# Patient Record
Sex: FEMALE | Race: OTHER | Employment: FULL TIME | URBAN - METROPOLITAN AREA
[De-identification: names, ages, dates, MRNs, and addresses within clinical notes are randomized per-mention and may not be internally consistent; named-entity substitution may affect disease eponyms.]

---

## 2017-01-07 ENCOUNTER — HOSPITAL ENCOUNTER (EMERGENCY)
Facility: HOSPITAL | Age: 37
Discharge: HOME | End: 2017-01-07
Attending: PHYSICIAN ASSISTANT
Payer: COMMERCIAL

## 2017-01-07 ENCOUNTER — APPOINTMENT (OUTPATIENT)
Dept: ULTRASOUND IMAGING | Facility: HOSPITAL | Age: 37
End: 2017-01-07
Attending: PHYSICIAN ASSISTANT
Payer: COMMERCIAL

## 2017-01-07 VITALS
SYSTOLIC BLOOD PRESSURE: 133 MMHG | DIASTOLIC BLOOD PRESSURE: 75 MMHG | OXYGEN SATURATION: 99 % | BODY MASS INDEX: 25.61 KG/M2 | HEART RATE: 80 BPM | TEMPERATURE: 99 F | HEIGHT: 64 IN | WEIGHT: 150 LBS | RESPIRATION RATE: 16 BRPM

## 2017-01-07 DIAGNOSIS — N93.9 VAGINAL BLEEDING: Primary | ICD-10-CM

## 2017-01-07 LAB
B-HCG SERPL-ACNC: 0 MIU/ML
B-HCG UR QL: NEGATIVE
BASOPHILS # BLD: 0.1 K/UL (ref 0–0.2)
BASOPHILS NFR BLD: 1 %
BILIRUB UR QL: NEGATIVE
CLARITY UR: CLEAR
COLOR UR: YELLOW
EOSINOPHIL # BLD: 0.2 K/UL (ref 0–0.7)
EOSINOPHIL NFR BLD: 2 %
ERYTHROCYTE [DISTWIDTH] IN BLOOD BY AUTOMATED COUNT: 14.8 % (ref 11–15)
GLUCOSE UR-MCNC: NEGATIVE MG/DL
HCT VFR BLD AUTO: 35.7 % (ref 35–48)
HGB BLD-MCNC: 11.7 G/DL (ref 12–16)
HYALINE CASTS #/AREA URNS AUTO: 1 /LPF
KETONES UR-MCNC: 20 MG/DL
LYMPHOCYTES # BLD: 3 K/UL (ref 1–4)
LYMPHOCYTES NFR BLD: 34 %
MCH RBC QN AUTO: 26.6 PG (ref 27–32)
MCHC RBC AUTO-ENTMCNC: 32.9 G/DL (ref 32–37)
MCV RBC AUTO: 80.7 FL (ref 80–100)
MONOCYTES # BLD: 0.6 K/UL (ref 0–1)
MONOCYTES NFR BLD: 7 %
NEUTROPHILS # BLD AUTO: 4.9 K/UL (ref 1.8–7.7)
NEUTROPHILS NFR BLD: 57 %
NITRITE UR QL STRIP.AUTO: NEGATIVE
PH UR: 5 [PH] (ref 5–8)
PLATELET # BLD AUTO: 326 K/UL (ref 140–400)
PMV BLD AUTO: 8 FL (ref 7.4–10.3)
PROT UR-MCNC: NEGATIVE MG/DL
RBC # BLD AUTO: 4.42 M/UL (ref 3.7–5.4)
RBC #/AREA URNS AUTO: 4 /HPF
RH BLOOD TYPE: POSITIVE
SP GR UR STRIP: 1.01 (ref 1–1.03)
UROBILINOGEN UR STRIP-ACNC: <2
VIT C UR-MCNC: NEGATIVE MG/DL
WBC # BLD AUTO: 8.7 K/UL (ref 4–11)
WBC #/AREA URNS AUTO: 2 /HPF

## 2017-01-07 PROCEDURE — 87491 CHLMYD TRACH DNA AMP PROBE: CPT | Performed by: PHYSICIAN ASSISTANT

## 2017-01-07 PROCEDURE — 76856 US EXAM PELVIC COMPLETE: CPT

## 2017-01-07 PROCEDURE — 81001 URINALYSIS AUTO W/SCOPE: CPT | Performed by: PHYSICIAN ASSISTANT

## 2017-01-07 PROCEDURE — 99285 EMERGENCY DEPT VISIT HI MDM: CPT

## 2017-01-07 PROCEDURE — 86900 BLOOD TYPING SEROLOGIC ABO: CPT | Performed by: PHYSICIAN ASSISTANT

## 2017-01-07 PROCEDURE — 87808 TRICHOMONAS ASSAY W/OPTIC: CPT | Performed by: PHYSICIAN ASSISTANT

## 2017-01-07 PROCEDURE — 86901 BLOOD TYPING SEROLOGIC RH(D): CPT | Performed by: PHYSICIAN ASSISTANT

## 2017-01-07 PROCEDURE — 87106 FUNGI IDENTIFICATION YEAST: CPT | Performed by: PHYSICIAN ASSISTANT

## 2017-01-07 PROCEDURE — 84702 CHORIONIC GONADOTROPIN TEST: CPT | Performed by: PHYSICIAN ASSISTANT

## 2017-01-07 PROCEDURE — 76830 TRANSVAGINAL US NON-OB: CPT

## 2017-01-07 PROCEDURE — 81025 URINE PREGNANCY TEST: CPT

## 2017-01-07 PROCEDURE — 85025 COMPLETE CBC W/AUTO DIFF WBC: CPT | Performed by: PHYSICIAN ASSISTANT

## 2017-01-07 PROCEDURE — 87591 N.GONORRHOEAE DNA AMP PROB: CPT | Performed by: PHYSICIAN ASSISTANT

## 2017-01-07 PROCEDURE — 36415 COLL VENOUS BLD VENIPUNCTURE: CPT

## 2017-01-07 PROCEDURE — 87205 SMEAR GRAM STAIN: CPT | Performed by: PHYSICIAN ASSISTANT

## 2017-01-07 RX ORDER — ACETAMINOPHEN 500 MG
1000 TABLET ORAL ONCE
Status: COMPLETED | OUTPATIENT
Start: 2017-01-07 | End: 2017-01-07

## 2017-01-07 NOTE — ED INITIAL ASSESSMENT (HPI)
Pt presents to the er with c/o abd cramping with bleeding that started last night, bright red blood, has used 3 pads in the last four hours.    lmp 10/15/2016

## 2017-01-08 NOTE — ED PROVIDER NOTES
Patient Seen in: [facility] [place] AND [facility] Emergency Department    History   Patient presents with:  Eval-G (gynecologic)    Stated Complaint: Bleeding     HPI    59-year-old female presents with chief complaint of vaginal bleeding. Onset yesterday.   Patient co systems reviewed and negative except as noted above. PSFH elements reviewed from today and agreed except as otherwise stated in HPI.     Physical Exam     ED Triage Vitals   BP 01/07/17 1327 129/86 mmHg   Pulse 01/07/17 1327 80   Resp 01/07/17 1327 20 deformity. Neurological: Gross motor movement is intact in all 4 extremities. Patient exhibits normal speech. Skin: Skin is normal to inspection and palpation. Warm and dry. No obvious bruising. No obvious rash.   ED Course     Labs Reviewed   Marquis Maurice MD [phone]  [address]ilio Barrios  [phone]    Schedule an appointment as soon as possible for a visit in 2 days  For follow-up      Medications Prescribed:  Current Discharge Medication List

## 2017-01-08 NOTE — ED NOTES
D/C INSTRUCTIONS  GIVEN TO PT. STATES VERBAL UNDERSTANDING. INSTRUCTED TO F/U WITH MD AS DIRECTED. HAS NO FURTHER QUESTIONS.

## 2017-01-09 LAB
C TRACH DNA SPEC QL NAA+PROBE: NEGATIVE
GENITAL VAGINOSIS SCREEN: NEGATIVE
N GONORRHOEA DNA SPEC QL NAA+PROBE: NEGATIVE
TRICHOMONAS SCREEN: NEGATIVE

## 2017-01-10 ENCOUNTER — OFFICE VISIT (OUTPATIENT)
Dept: FAMILY MEDICINE CLINIC | Facility: CLINIC | Age: 37
End: 2017-01-10

## 2017-01-10 VITALS
RESPIRATION RATE: 12 BRPM | HEART RATE: 98 BPM | HEIGHT: 64 IN | WEIGHT: 150 LBS | BODY MASS INDEX: 25.61 KG/M2 | SYSTOLIC BLOOD PRESSURE: 125 MMHG | DIASTOLIC BLOOD PRESSURE: 81 MMHG | TEMPERATURE: 100 F

## 2017-01-10 DIAGNOSIS — J02.9 SORE THROAT: Primary | ICD-10-CM

## 2017-01-10 DIAGNOSIS — Z20.818 EXPOSURE TO STREP THROAT: ICD-10-CM

## 2017-01-10 LAB
CONTROL LINE PRESENT WITH A CLEAR BACKGROUND (YES/NO): YES YES/NO
KIT EXPIRATION DATE: NORMAL DATE
KIT LOT #: NORMAL NUMERIC

## 2017-01-10 PROCEDURE — 99213 OFFICE O/P EST LOW 20 MIN: CPT | Performed by: FAMILY MEDICINE

## 2017-01-10 PROCEDURE — 87880 STREP A ASSAY W/OPTIC: CPT | Performed by: FAMILY MEDICINE

## 2017-01-10 RX ORDER — PREDNISONE 20 MG/1
TABLET ORAL
Qty: 10 TABLET | Refills: 0 | Status: SHIPPED | OUTPATIENT
Start: 2017-01-10 | End: 2017-11-27

## 2017-01-10 NOTE — PROGRESS NOTES
Patient ID: Miguel Del Valle is a 43year old female. HPI  Patient presents with:  Sore Throat: x 1 day, exposed to strep    Her son was strep positive at the immediate care was treated with Zithromax. She wanted to be worked in today.   She states s all orders for this visit:    Sore throat  -     POC Rapid Strep [20875]  -     Grp A Strep Cult, Throat [E]; Future  -     predniSONE 20 MG Oral Tab; Take 2 by mouth at same time daily for 5 days. We will start on prednisone and await strep culture.   Her

## 2017-07-03 NOTE — TELEPHONE ENCOUNTER
Pharm e script failed   Pharm requesting to have a refill on       RX vitamin d 52208 iu  Please advise

## 2017-07-07 RX ORDER — ERGOCALCIFEROL 1.25 MG/1
50000 CAPSULE ORAL WEEKLY
Qty: 12 CAPSULE | Refills: 0 | Status: SHIPPED | OUTPATIENT
Start: 2017-07-07 | End: 2017-10-11

## 2017-10-10 ENCOUNTER — TELEPHONE (OUTPATIENT)
Dept: FAMILY MEDICINE CLINIC | Facility: CLINIC | Age: 37
End: 2017-10-10

## 2017-10-11 RX ORDER — ERGOCALCIFEROL 1.25 MG/1
CAPSULE ORAL
Qty: 12 CAPSULE | Refills: 0 | Status: SHIPPED | OUTPATIENT
Start: 2017-10-11 | End: 2017-11-27

## 2017-10-12 NOTE — TELEPHONE ENCOUNTER
Refill times 1 but needs appt for next script. Please call patient. Needs to come in for a physical exam.  This is required for her insurance.

## 2017-10-21 ENCOUNTER — HOSPITAL ENCOUNTER (OUTPATIENT)
Age: 37
Discharge: HOME | End: 2017-10-21
Attending: EMERGENCY MEDICINE
Payer: COMMERCIAL

## 2017-10-21 VITALS
WEIGHT: 160 LBS | OXYGEN SATURATION: 100 % | BODY MASS INDEX: 27.31 KG/M2 | RESPIRATION RATE: 18 BRPM | HEART RATE: 77 BPM | TEMPERATURE: 98 F | SYSTOLIC BLOOD PRESSURE: 117 MMHG | HEIGHT: 64 IN | DIASTOLIC BLOOD PRESSURE: 80 MMHG

## 2017-10-21 DIAGNOSIS — M27.3 DRY TOOTH SOCKET: Primary | ICD-10-CM

## 2017-10-21 PROCEDURE — 99212 OFFICE O/P EST SF 10 MIN: CPT

## 2017-10-21 RX ORDER — CLINDAMYCIN HYDROCHLORIDE 150 MG/1
150 CAPSULE ORAL 3 TIMES DAILY
Qty: 21 CAPSULE | Refills: 0 | Status: SHIPPED | OUTPATIENT
Start: 2017-10-21 | End: 2017-10-28

## 2017-10-21 NOTE — ED PROVIDER NOTES
Patient Seen in: [facility] [address] [place] [address]    History   Patient presents with:  Fever (infectious)    Stated Complaint: fever after tooth extraction    HPI    Patient is a 78-year-old female who states that she had a tooth extracted se ============================================================  ED Course  ------------------------------------------------------------   Tooth socket irrigated with saline with some improvement  MDM           Disposition and Plan     Clinical Impression:  D

## 2017-11-27 ENCOUNTER — HOSPITAL ENCOUNTER (OUTPATIENT)
Dept: GENERAL RADIOLOGY | Age: 37
Discharge: HOME | End: 2017-11-27
Attending: FAMILY MEDICINE
Payer: COMMERCIAL

## 2017-11-27 ENCOUNTER — OFFICE VISIT (OUTPATIENT)
Dept: FAMILY MEDICINE CLINIC | Facility: CLINIC | Age: 37
End: 2017-11-27

## 2017-11-27 ENCOUNTER — NURSE TRIAGE (OUTPATIENT)
Dept: OTHER | Age: 37
End: 2017-11-27

## 2017-11-27 VITALS
SYSTOLIC BLOOD PRESSURE: 118 MMHG | HEIGHT: 64 IN | RESPIRATION RATE: 12 BRPM | HEART RATE: 81 BPM | WEIGHT: 158.63 LBS | DIASTOLIC BLOOD PRESSURE: 77 MMHG | BODY MASS INDEX: 27.08 KG/M2 | TEMPERATURE: 99 F

## 2017-11-27 DIAGNOSIS — R20.2 PARESTHESIAS IN RIGHT HAND: ICD-10-CM

## 2017-11-27 DIAGNOSIS — M25.531 RIGHT WRIST PAIN: ICD-10-CM

## 2017-11-27 DIAGNOSIS — M25.531 RIGHT WRIST PAIN: Primary | ICD-10-CM

## 2017-11-27 DIAGNOSIS — Z88.6 ALLERGY TO NSAIDS: ICD-10-CM

## 2017-11-27 DIAGNOSIS — G56.21 ULNAR NEURITIS, RIGHT: ICD-10-CM

## 2017-11-27 PROCEDURE — 73110 X-RAY EXAM OF WRIST: CPT | Performed by: FAMILY MEDICINE

## 2017-11-27 PROCEDURE — 99212 OFFICE O/P EST SF 10 MIN: CPT | Performed by: FAMILY MEDICINE

## 2017-11-27 PROCEDURE — 99214 OFFICE O/P EST MOD 30 MIN: CPT | Performed by: FAMILY MEDICINE

## 2017-11-27 PROCEDURE — L3908 WHO COCK-UP NONMOLDE PRE OTS: HCPCS | Performed by: FAMILY MEDICINE

## 2017-11-27 PROCEDURE — 73080 X-RAY EXAM OF ELBOW: CPT | Performed by: FAMILY MEDICINE

## 2017-11-27 RX ORDER — METHOCARBAMOL 750 MG/1
750 TABLET, FILM COATED ORAL 3 TIMES DAILY PRN
Qty: 60 TABLET | Refills: 0 | Status: SHIPPED | OUTPATIENT
Start: 2017-11-27 | End: 2017-12-07

## 2017-11-27 NOTE — PATIENT INSTRUCTIONS
Avoid resting her right elbow on a hard surface. You may want to put a pillow or a pad underneath her elbow so as not to irritate the ulnar nerves. We are a wrist brace on the right wrist every night for the next month.   If you can wear it at work and

## 2017-11-27 NOTE — TELEPHONE ENCOUNTER
Action Requested: Summary for Provider     []  Critical Lab, Recommendations Needed  [] Need Additional Advice  []   FYI    []   Need Orders  [] Need Medications Sent to Pharmacy  []  Other     SUMMARY: Dr. Brianna Rios consulted, agreed for patient to come in t work.)  Alleviated by: acetaminophen;heat (Tylenol helps a little. Compression wrist support helped a little.  Heat helped a little.)             Reason for Disposition  • Numbness (i.e., loss of sensation) of new onset in hand or fingers    Protocols used:

## 2017-11-27 NOTE — PROGRESS NOTES
Patient ID: Dayron Karimi is a 42year old female. HPI  Patient presents with:  Pain: right wrist     I saw her son today. She wanted to be worked in at the same time. She is right-handed.   She states she has had pain and tingling in the third t oriented to person, place, and time. Patient appears well-developed and well-nourished. No distress. Vitals have been reviewed. Right wrist: She does have a positive Tinel's at the median nerve. She also has a positive flick test.  No hand atrophy. XR ELBOW, (3 VIEWS), RIGHT (CPT=73070); Future  -     methocarbamol 750 MG Oral Tab; Take 1 tablet (750 mg total) by mouth 3 (three) times daily as needed. For muscle relaxation. Can possibly make you tired.   X-ray of the elbow just make sure there is

## 2017-11-29 NOTE — TELEPHONE ENCOUNTER
Myra Shah from pharmacy is calling to follow up on pts refill request        Current Outpatient Prescriptions:  ERGOCALCIFEROL 41729 units Oral Cap TAKE 1 CAPSULE BY MOUTH 1 TIME A WEEK Disp: 12 capsule Rfl: 0

## 2017-11-30 RX ORDER — ERGOCALCIFEROL 1.25 MG/1
CAPSULE ORAL
Qty: 12 CAPSULE | Refills: 0 | Status: SHIPPED | OUTPATIENT
Start: 2017-11-30 | End: 2018-07-03

## 2018-07-03 ENCOUNTER — OFFICE VISIT (OUTPATIENT)
Dept: FAMILY MEDICINE CLINIC | Facility: CLINIC | Age: 38
End: 2018-07-03

## 2018-07-03 VITALS
HEART RATE: 85 BPM | BODY MASS INDEX: 27 KG/M2 | DIASTOLIC BLOOD PRESSURE: 75 MMHG | TEMPERATURE: 99 F | SYSTOLIC BLOOD PRESSURE: 125 MMHG | WEIGHT: 156 LBS

## 2018-07-03 DIAGNOSIS — N30.00 ACUTE CYSTITIS WITHOUT HEMATURIA: ICD-10-CM

## 2018-07-03 DIAGNOSIS — R35.0 URINE FREQUENCY: Primary | ICD-10-CM

## 2018-07-03 LAB
CONTROL LINE PRESENT WITH A CLEAR BACKGROUND (YES/NO): YES YES/NO
KIT LOT #: NORMAL NUMERIC
MULTISTIX LOT#: ABNORMAL NUMERIC
PH, URINE: 7.5 (ref 4.5–8)
PREGNANCY TEST, URINE: NEGATIVE
SPECIFIC GRAVITY: 1.01 (ref 1–1.03)
URINE-COLOR: YELLOW

## 2018-07-03 PROCEDURE — 99212 OFFICE O/P EST SF 10 MIN: CPT | Performed by: FAMILY MEDICINE

## 2018-07-03 PROCEDURE — 99213 OFFICE O/P EST LOW 20 MIN: CPT | Performed by: FAMILY MEDICINE

## 2018-07-03 PROCEDURE — 81002 URINALYSIS NONAUTO W/O SCOPE: CPT | Performed by: FAMILY MEDICINE

## 2018-07-03 PROCEDURE — 81025 URINE PREGNANCY TEST: CPT | Performed by: FAMILY MEDICINE

## 2018-07-03 RX ORDER — FLUCONAZOLE 150 MG/1
150 TABLET ORAL ONCE
Qty: 1 TABLET | Refills: 0 | Status: SHIPPED | OUTPATIENT
Start: 2018-07-03 | End: 2018-07-03

## 2018-07-03 RX ORDER — LEVOFLOXACIN 500 MG/1
500 TABLET, FILM COATED ORAL DAILY
Qty: 7 TABLET | Refills: 0 | Status: SHIPPED | OUTPATIENT
Start: 2018-07-03 | End: 2018-07-05

## 2018-07-03 NOTE — PROGRESS NOTES
HPI:    Patient ID: Rony Miller is a 34year old female. HPI  Patient presents with:  Abdominal Pain: RT side pain, had UTI symptoms 3 days ago,     Review of Systems   Constitutional: Negative.     Genitourinary: Positive for dysuria, flank beatriz

## 2018-07-05 ENCOUNTER — APPOINTMENT (OUTPATIENT)
Dept: ULTRASOUND IMAGING | Facility: HOSPITAL | Age: 38
End: 2018-07-05
Attending: EMERGENCY MEDICINE
Payer: COMMERCIAL

## 2018-07-05 ENCOUNTER — NURSE TRIAGE (OUTPATIENT)
Dept: OTHER | Age: 38
End: 2018-07-05

## 2018-07-05 ENCOUNTER — HOSPITAL ENCOUNTER (EMERGENCY)
Facility: HOSPITAL | Age: 38
Discharge: HOME | End: 2018-07-05
Attending: EMERGENCY MEDICINE
Payer: COMMERCIAL

## 2018-07-05 VITALS
DIASTOLIC BLOOD PRESSURE: 82 MMHG | WEIGHT: 150 LBS | OXYGEN SATURATION: 99 % | BODY MASS INDEX: 25.61 KG/M2 | TEMPERATURE: 98 F | SYSTOLIC BLOOD PRESSURE: 125 MMHG | HEIGHT: 64 IN | RESPIRATION RATE: 18 BRPM | HEART RATE: 75 BPM

## 2018-07-05 DIAGNOSIS — N39.0 URINARY TRACT INFECTION WITHOUT HEMATURIA, SITE UNSPECIFIED: Primary | ICD-10-CM

## 2018-07-05 DIAGNOSIS — K29.00 ACUTE GASTRITIS WITHOUT HEMORRHAGE, UNSPECIFIED GASTRITIS TYPE: ICD-10-CM

## 2018-07-05 LAB
ALBUMIN SERPL BCP-MCNC: 4 G/DL (ref 3.5–4.8)
ALP SERPL-CCNC: 99 U/L (ref 32–100)
ALT SERPL-CCNC: 18 U/L (ref 14–54)
ANION GAP SERPL CALC-SCNC: 8 MMOL/L (ref 0–18)
AST SERPL-CCNC: 20 U/L (ref 15–41)
B-HCG UR QL: NEGATIVE
BACTERIA UR QL AUTO: NEGATIVE /HPF
BASOPHILS # BLD: 0.1 K/UL (ref 0–0.2)
BASOPHILS NFR BLD: 1 %
BILIRUB DIRECT SERPL-MCNC: 0.1 MG/DL (ref 0–0.2)
BILIRUB SERPL-MCNC: 0.5 MG/DL (ref 0.3–1.2)
BILIRUB UR QL: NEGATIVE
BUN SERPL-MCNC: 13 MG/DL (ref 8–20)
BUN/CREAT SERPL: 16.7 (ref 10–20)
CALCIUM SERPL-MCNC: 9 MG/DL (ref 8.5–10.5)
CHLORIDE SERPL-SCNC: 101 MMOL/L (ref 95–110)
CLARITY UR: CLEAR
CO2 SERPL-SCNC: 26 MMOL/L (ref 22–32)
COLOR UR: YELLOW
CREAT SERPL-MCNC: 0.78 MG/DL (ref 0.5–1.5)
EOSINOPHIL # BLD: 0.4 K/UL (ref 0–0.7)
EOSINOPHIL NFR BLD: 3 %
ERYTHROCYTE [DISTWIDTH] IN BLOOD BY AUTOMATED COUNT: 14.8 % (ref 11–15)
GLUCOSE SERPL-MCNC: 100 MG/DL (ref 70–99)
GLUCOSE UR-MCNC: NEGATIVE MG/DL
HCT VFR BLD AUTO: 37.5 % (ref 35–48)
HGB BLD-MCNC: 12.5 G/DL (ref 12–16)
KETONES UR-MCNC: NEGATIVE MG/DL
LACTATE SERPL-SCNC: 0.9 MMOL/L (ref 0.5–2.2)
LIPASE SERPL-CCNC: 38 U/L (ref 22–51)
LYMPHOCYTES # BLD: 4.8 K/UL (ref 1–4)
LYMPHOCYTES NFR BLD: 40 %
MCH RBC QN AUTO: 26.6 PG (ref 27–32)
MCHC RBC AUTO-ENTMCNC: 33.3 G/DL (ref 32–37)
MCV RBC AUTO: 80 FL (ref 80–100)
MONOCYTES # BLD: 0.8 K/UL (ref 0–1)
MONOCYTES NFR BLD: 7 %
NEUTROPHILS # BLD AUTO: 5.9 K/UL (ref 1.8–7.7)
NEUTROPHILS NFR BLD: 49 %
NITRITE UR QL STRIP.AUTO: NEGATIVE
OSMOLALITY UR CALC.SUM OF ELEC: 280 MOSM/KG (ref 275–295)
PH UR: 5 [PH] (ref 5–8)
PLATELET # BLD AUTO: 416 K/UL (ref 140–400)
PMV BLD AUTO: 8.2 FL (ref 7.4–10.3)
POTASSIUM SERPL-SCNC: 3.6 MMOL/L (ref 3.3–5.1)
PROT SERPL-MCNC: 7.6 G/DL (ref 5.9–8.4)
PROT UR-MCNC: NEGATIVE MG/DL
RBC # BLD AUTO: 4.69 M/UL (ref 3.7–5.4)
RBC #/AREA URNS AUTO: 2 /HPF
SODIUM SERPL-SCNC: 135 MMOL/L (ref 136–144)
SP GR UR STRIP: 1.02 (ref 1–1.03)
UROBILINOGEN UR STRIP-ACNC: <2
VIT C UR-MCNC: NEGATIVE MG/DL
WBC # BLD AUTO: 12 K/UL (ref 4–11)
WBC #/AREA URNS AUTO: 9 /HPF

## 2018-07-05 PROCEDURE — 99284 EMERGENCY DEPT VISIT MOD MDM: CPT

## 2018-07-05 PROCEDURE — 96361 HYDRATE IV INFUSION ADD-ON: CPT

## 2018-07-05 PROCEDURE — 85025 COMPLETE CBC W/AUTO DIFF WBC: CPT | Performed by: EMERGENCY MEDICINE

## 2018-07-05 PROCEDURE — 76705 ECHO EXAM OF ABDOMEN: CPT | Performed by: EMERGENCY MEDICINE

## 2018-07-05 PROCEDURE — 87086 URINE CULTURE/COLONY COUNT: CPT | Performed by: EMERGENCY MEDICINE

## 2018-07-05 PROCEDURE — 83690 ASSAY OF LIPASE: CPT | Performed by: EMERGENCY MEDICINE

## 2018-07-05 PROCEDURE — 80048 BASIC METABOLIC PNL TOTAL CA: CPT | Performed by: EMERGENCY MEDICINE

## 2018-07-05 PROCEDURE — 96375 TX/PRO/DX INJ NEW DRUG ADDON: CPT

## 2018-07-05 PROCEDURE — 81025 URINE PREGNANCY TEST: CPT

## 2018-07-05 PROCEDURE — 80076 HEPATIC FUNCTION PANEL: CPT | Performed by: EMERGENCY MEDICINE

## 2018-07-05 PROCEDURE — 96374 THER/PROPH/DIAG INJ IV PUSH: CPT

## 2018-07-05 PROCEDURE — 81001 URINALYSIS AUTO W/SCOPE: CPT | Performed by: EMERGENCY MEDICINE

## 2018-07-05 PROCEDURE — 83605 ASSAY OF LACTIC ACID: CPT | Performed by: EMERGENCY MEDICINE

## 2018-07-05 RX ORDER — CEPHALEXIN 500 MG/1
500 CAPSULE ORAL 3 TIMES DAILY
Qty: 21 CAPSULE | Refills: 0 | Status: SHIPPED | OUTPATIENT
Start: 2018-07-05 | End: 2018-07-12

## 2018-07-05 RX ORDER — DIPHENHYDRAMINE HYDROCHLORIDE 50 MG/ML
25 INJECTION INTRAMUSCULAR; INTRAVENOUS ONCE
Status: COMPLETED | OUTPATIENT
Start: 2018-07-05 | End: 2018-07-05

## 2018-07-05 RX ORDER — METOCLOPRAMIDE HYDROCHLORIDE 5 MG/ML
10 INJECTION INTRAMUSCULAR; INTRAVENOUS ONCE
Status: COMPLETED | OUTPATIENT
Start: 2018-07-05 | End: 2018-07-05

## 2018-07-05 RX ORDER — FAMOTIDINE 20 MG/1
20 TABLET ORAL ONCE
Status: COMPLETED | OUTPATIENT
Start: 2018-07-05 | End: 2018-07-05

## 2018-07-05 NOTE — TELEPHONE ENCOUNTER
Please reply to pool: EM RN TRIAGE  Action Requested: Summary for Provider     []  Critical Lab, Recommendations Needed  [] Need Additional Advice  [x]   FYI    []   Need Orders  [] Need Medications Sent to Pharmacy  []  Other     SUMMARY: Patient callin

## 2018-07-05 NOTE — ED INITIAL ASSESSMENT (HPI)
Abd distention since this morning, nausea noted. No vomiting. Last BM last night. No blood or straining. Pain noted to right and generalized abd along with right flank. Recently dx with kidney infection. Currently on abx.

## 2018-07-06 NOTE — ED PROVIDER NOTES
Patient Seen in: [facility] AND [facility] Emergency Department     History   Patient presents with:  Abdomen/Flank Pain (GI/)  Nausea/Vomiting/Diarrhea (gastrointestinal)    Stated Complaint: Abd distention started today.      HPI    34year old female complains Oral  SpO2: 99 %  O2 Device: None (Room air)    Current:/82   Pulse 75   Temp 98.2 °F (36.8 °C) (Oral)   Resp 18   Ht 162.6 cm (5' 4\")   Wt 68 kg   LMP 06/19/2018   SpO2 99%   BMI 25.75 kg/m²         Physical Exam   Constitutional: She is oriented t within normal limits   BASIC METABOLIC PANEL (8) - Abnormal; Notable for the following:     Glucose 100 (*)     Sodium 135 (*)     All other components within normal limits   CBC W/ DIFFERENTIAL - Abnormal; Notable for the following:     WBC 12.0 (*)     M ED Medications Administered:   Medications   G. I. cocktail (Mylanta/dicyclomine/lidocaine 2% viscous) ( Oral Given 7/5/18 2044)   famoTIDine (PEPCID) tab 20 mg (20 mg Oral Given 7/5/18 2044)   sodium chloride 0.9% IV bolus 1,000 mL (1,000 mL Intravenou for serious bacterial infection/sepsis picture  patient's symptoms resolved after ED treatment. overall clinical presentation including general toxicity and distal perfusion are no significant change.  hemodynamic function is no significant change.      D return instructions, and follow up information were provided prior to discharge from the ED if sent home.  We also recommended that the patient schedule follow up care with a primary care provider as soon as possible to obtain basic health screening includi

## 2018-07-06 NOTE — ED NOTES
Patient off floor to [facility] [facility]

## 2018-07-06 NOTE — TELEPHONE ENCOUNTER
ED FU Call: No answer at home [facility] hospital#[phone] and no voice mail option to leave a message. Clinical staff to call again later for  Status check.

## 2018-07-07 NOTE — TELEPHONE ENCOUNTER
Pt to take tylenol 2 tabs q 4-6 hrs prn.   Miralax 1 capful daily, plenty of water should help resolve constipation

## 2018-07-07 NOTE — TELEPHONE ENCOUNTER
Please advise. OV appt was made for 18 with Dr. Bernabe Sánchez but pt requesting possible Rx for the pain until then. To [facility] Mendenhall for Dr. Bernabe Sánchez out of office.   Please reply to pool: EM RN TRIAGE    Pt contacted (Name and  verified) and states th

## 2018-07-09 ENCOUNTER — OFFICE VISIT (OUTPATIENT)
Dept: FAMILY MEDICINE CLINIC | Facility: CLINIC | Age: 38
End: 2018-07-09

## 2018-07-09 VITALS
DIASTOLIC BLOOD PRESSURE: 78 MMHG | BODY MASS INDEX: 25.57 KG/M2 | HEIGHT: 64 IN | HEART RATE: 71 BPM | TEMPERATURE: 98 F | SYSTOLIC BLOOD PRESSURE: 109 MMHG | RESPIRATION RATE: 14 BRPM | WEIGHT: 149.81 LBS

## 2018-07-09 DIAGNOSIS — E78.2 MIXED HYPERLIPIDEMIA: ICD-10-CM

## 2018-07-09 DIAGNOSIS — N30.00 ACUTE CYSTITIS WITHOUT HEMATURIA: ICD-10-CM

## 2018-07-09 DIAGNOSIS — R14.0 ABDOMINAL BLOATING: Primary | ICD-10-CM

## 2018-07-09 PROCEDURE — 99214 OFFICE O/P EST MOD 30 MIN: CPT | Performed by: FAMILY MEDICINE

## 2018-07-09 PROCEDURE — 99212 OFFICE O/P EST SF 10 MIN: CPT | Performed by: FAMILY MEDICINE

## 2018-07-09 NOTE — PROGRESS NOTES
Patient ID: Janee Gallardo is a 34year old female. HPI  Patient presents with:  ER F/U: uti     Patient went to the emergency room on July 5, 2018 due to right sided abdominal discomfort with dysuria for the past 5 days.   She was started on Leva CREATSERUM 0.78 07/05/2018   ANIONGAP 8 07/05/2018   GFRNAA >60 07/05/2018   GFRAA >60 07/05/2018   CA 9.0 07/05/2018   OSMOCALC 280 07/05/2018   ALKPHO 99 07/05/2018   AST 20 07/05/2018   ALT 18 07/05/2018   ALKPHOS 86 11/05/2011   BILT 0.5 07/05/2018 TIBCP, IRONBIND, SAT, SATUR    No results found for: YONG      Lab Results  Component Value Date   VITD25 22 (L) 10/08/2016   VDGZ04LZ 19.0 02/04/2012     No results found for: PSA, QPSA, TOTPSASCREEN      Wt Readings from Last 6 Encounters:  07/09/18 : 149 There is    no tenderness. There is no rigidity, no rebound, no guarding and negative  Rivero's sign. Neurological: Patient is alert and oriented to person, place, and time. Skin: Skin is warm and dry.    Psychiatric: Patient has a normal mood and affe

## 2018-12-11 ENCOUNTER — OFFICE VISIT (OUTPATIENT)
Dept: FAMILY MEDICINE CLINIC | Facility: CLINIC | Age: 38
End: 2018-12-11
Payer: COMMERCIAL

## 2018-12-11 VITALS
BODY MASS INDEX: 27.49 KG/M2 | HEART RATE: 80 BPM | SYSTOLIC BLOOD PRESSURE: 123 MMHG | TEMPERATURE: 99 F | DIASTOLIC BLOOD PRESSURE: 83 MMHG | WEIGHT: 161 LBS | HEIGHT: 64 IN

## 2018-12-11 DIAGNOSIS — J02.9 PHARYNGITIS, UNSPECIFIED ETIOLOGY: Primary | ICD-10-CM

## 2018-12-11 DIAGNOSIS — J02.9 SORE THROAT: ICD-10-CM

## 2018-12-11 PROCEDURE — 99212 OFFICE O/P EST SF 10 MIN: CPT | Performed by: PHYSICIAN ASSISTANT

## 2018-12-11 PROCEDURE — 87880 STREP A ASSAY W/OPTIC: CPT | Performed by: PHYSICIAN ASSISTANT

## 2018-12-11 PROCEDURE — 99213 OFFICE O/P EST LOW 20 MIN: CPT | Performed by: PHYSICIAN ASSISTANT

## 2018-12-11 RX ORDER — ACETAMINOPHEN 325 MG/1
325 TABLET ORAL EVERY 6 HOURS PRN
COMMUNITY
End: 2020-03-09

## 2018-12-11 RX ORDER — AMOXICILLIN AND CLAVULANATE POTASSIUM 875; 125 MG/1; MG/1
1 TABLET, FILM COATED ORAL 2 TIMES DAILY
Qty: 20 TABLET | Refills: 0 | Status: SHIPPED | OUTPATIENT
Start: 2018-12-11 | End: 2019-02-28

## 2018-12-11 NOTE — PROGRESS NOTES
HPI:     Sore Throat    This is a recurrent problem. The current episode started in the past 7 days. The problem has been gradually worsening. The maximum temperature recorded prior to her arrival was 100.4 - 100.9 F.  Associated symptoms include swollen gl Not on file      Highest education level: Not on file    Social Needs      Financial resource strain: Not on file      Food insecurity - worry: Not on file      Food insecurity - inability: Not on file      Transportation needs - medical: Not on file No cerumen present  Left Ear: Tympanic membrane and ear canal normal. Tympanic membrane is not erythematous.  No cerumen present  Nose: Nose normal.   Eyes: Conjunctivae are normal.   Cardiovascular: Normal rate, regular rhythm, S1 normal, S2 normal and nor

## 2018-12-12 NOTE — ASSESSMENT & PLAN NOTE
Supportive measures discussed, may use Tylenol as needed for fever/pain. Increase fluids and rest and gargle mouth with warm salt water. Patient will hold Augmentin until symptoms worsen.

## 2019-02-28 ENCOUNTER — OFFICE VISIT (OUTPATIENT)
Dept: FAMILY MEDICINE CLINIC | Facility: CLINIC | Age: 39
End: 2019-02-28
Payer: COMMERCIAL

## 2019-02-28 VITALS
HEART RATE: 90 BPM | TEMPERATURE: 99 F | SYSTOLIC BLOOD PRESSURE: 132 MMHG | WEIGHT: 161.81 LBS | DIASTOLIC BLOOD PRESSURE: 90 MMHG | HEIGHT: 64 IN | BODY MASS INDEX: 27.63 KG/M2 | RESPIRATION RATE: 16 BRPM

## 2019-02-28 DIAGNOSIS — Z88.6 ALLERGY TO NSAIDS: ICD-10-CM

## 2019-02-28 DIAGNOSIS — L29.9 LOCALIZED PRURITUS: ICD-10-CM

## 2019-02-28 DIAGNOSIS — G89.29 CHRONIC ELBOW PAIN, RIGHT: ICD-10-CM

## 2019-02-28 DIAGNOSIS — M25.521 CHRONIC ELBOW PAIN, RIGHT: ICD-10-CM

## 2019-02-28 DIAGNOSIS — L81.9 HYPERPIGMENTATION OF SKIN: ICD-10-CM

## 2019-02-28 DIAGNOSIS — M77.11 RIGHT TENNIS ELBOW: Primary | ICD-10-CM

## 2019-02-28 PROCEDURE — 99214 OFFICE O/P EST MOD 30 MIN: CPT | Performed by: FAMILY MEDICINE

## 2019-02-28 PROCEDURE — 99212 OFFICE O/P EST SF 10 MIN: CPT | Performed by: FAMILY MEDICINE

## 2019-02-28 RX ORDER — CYCLOBENZAPRINE HCL 10 MG
10 TABLET ORAL NIGHTLY
Qty: 30 TABLET | Refills: 1 | Status: SHIPPED | OUTPATIENT
Start: 2019-02-28 | End: 2019-03-20

## 2019-02-28 NOTE — PROGRESS NOTES
Patient ID: Chilango Valverde is a 34year old female. HPI  Patient presents with:  Pain: right elbow   Rash: breeast and right hip   She is right hand dominant. Patient initially had right wrist pain that started a year ago.  She came in to see me fo pain.   Musculoskeletal: Positive for arthralgias (right elbow). Skin: Positive for rash. Negative for color change. Neurological: Negative for speech difficulty. Psychiatric/Behavioral: The patient is not nervous/anxious.           Past Medical Histo normal mood and affect. Nursing note and vitals reviewed. ASSESSMENT/PLAN:     Diagnoses and all orders for this visit:    Right tennis elbow  -     PHYSICAL THERAPY EXTERNAL  -     Cyclobenzaprine HCl 10 MG Oral Tab;  Take 1 tablet (10 mg total Physician goals: Pain relief, Increased Function, Activities of daily living and Education              Frequency: 2-3 times per week. ....... Rinku Foley Duration: 4-6 weeks                                          Can take to various Physical Therapy locations as is DO Logan. Electronically Signed: Ita Yip, 2/28/2019, 3:34 PM.    IDelilah DO,  personally performed the services described in this documentation. All medical record entries made by the scribe were at my direction and in my presence.

## 2019-02-28 NOTE — PATIENT INSTRUCTIONS
TENNIS ELBOW PLAN    Showed patient my tennis elbow brace and where to wear it on their forearm. Wear the tennis elbow brace 3 fingerbreadths down from the lateral epicondyle as shown at the visit.   Go ahead and ice

## 2019-03-19 ENCOUNTER — TELEPHONE (OUTPATIENT)
Dept: FAMILY MEDICINE CLINIC | Facility: CLINIC | Age: 39
End: 2019-03-19

## 2019-03-19 NOTE — TELEPHONE ENCOUNTER
Dr. Perez Favors,    [facility] intermittent leave for elbow/wrist pain - 1-4 days per month for 6 months starting from 2/28/19. Please sign off on form:  -Highlight the patient and hit \"Chart\" button.   -In Chart Review, w/in the Encounter tab - click 1 time on t

## 2019-03-19 NOTE — TELEPHONE ENCOUNTER
FMLA form recvd at [facility]. Pt attached her own signed HIPAA. Will let her know about fee. Emailed HIPAA packet to [email or web address]. Logged for processing.

## 2019-03-28 NOTE — TELEPHONE ENCOUNTER
Signed release and CC info recvd via email from pt. Emailed FMLA to HR: [email or web address].  Emailed FMLA to pt at [email or web address]

## 2019-04-01 ENCOUNTER — OFFICE VISIT (OUTPATIENT)
Dept: FAMILY MEDICINE CLINIC | Facility: CLINIC | Age: 39
End: 2019-04-01
Payer: COMMERCIAL

## 2019-04-01 VITALS
TEMPERATURE: 98 F | DIASTOLIC BLOOD PRESSURE: 84 MMHG | SYSTOLIC BLOOD PRESSURE: 123 MMHG | BODY MASS INDEX: 27.28 KG/M2 | RESPIRATION RATE: 14 BRPM | HEART RATE: 76 BPM | WEIGHT: 159.81 LBS | HEIGHT: 64 IN

## 2019-04-01 DIAGNOSIS — Z88.6 ALLERGY TO NSAIDS: ICD-10-CM

## 2019-04-01 DIAGNOSIS — M77.11 RIGHT TENNIS ELBOW: Primary | ICD-10-CM

## 2019-04-01 DIAGNOSIS — M25.522 LEFT ELBOW PAIN: ICD-10-CM

## 2019-04-01 PROCEDURE — 99214 OFFICE O/P EST MOD 30 MIN: CPT | Performed by: FAMILY MEDICINE

## 2019-04-01 PROCEDURE — 99212 OFFICE O/P EST SF 10 MIN: CPT | Performed by: FAMILY MEDICINE

## 2019-04-01 NOTE — PROGRESS NOTES
Patient ID: Swetha Azevedo is a 34year old female. HPI  Patient presents with: Follow - Up: right elbow pain, pain moving to her shoulder   She is a  and types a lot at work.  Pt has a tennis elbow brace on the right arm i • Lipid screening 02-    Per [employer]   • Vaginal delivery 02/12/1995, 08/24/1997, 06/28/2003       History reviewed. No pertinent surgical history.        Current Outpatient Medications:  acetaminophen 325 MG Oral Tab Take 325 mg by mouth every 6 ( do some time off as work is aggravating this pain. Left elbow pain   Mild pain but this may be due to compensation due to the right elbow pain.   Allergy to NSAIDs  Needs to make an appointment to Dr. Shiva Grullon the allergist.      Referrals (if applicable)

## 2019-04-09 ENCOUNTER — OFFICE VISIT (OUTPATIENT)
Dept: NEUROLOGY | Facility: CLINIC | Age: 39
End: 2019-04-09
Payer: COMMERCIAL

## 2019-04-09 ENCOUNTER — TELEPHONE (OUTPATIENT)
Dept: NEUROLOGY | Facility: CLINIC | Age: 39
End: 2019-04-09

## 2019-04-09 VITALS
HEIGHT: 64 IN | SYSTOLIC BLOOD PRESSURE: 122 MMHG | DIASTOLIC BLOOD PRESSURE: 82 MMHG | HEART RATE: 92 BPM | WEIGHT: 150 LBS | RESPIRATION RATE: 16 BRPM | BODY MASS INDEX: 25.61 KG/M2

## 2019-04-09 DIAGNOSIS — R20.0 NUMBNESS AND TINGLING OF RIGHT HAND: ICD-10-CM

## 2019-04-09 DIAGNOSIS — M77.11 LATERAL EPICONDYLITIS OF RIGHT ELBOW: ICD-10-CM

## 2019-04-09 DIAGNOSIS — R20.2 NUMBNESS AND TINGLING OF RIGHT HAND: ICD-10-CM

## 2019-04-09 DIAGNOSIS — M25.521 RIGHT ELBOW PAIN: Primary | ICD-10-CM

## 2019-04-09 PROCEDURE — 99244 OFF/OP CNSLTJ NEW/EST MOD 40: CPT | Performed by: PHYSICAL MEDICINE & REHABILITATION

## 2019-04-09 RX ORDER — LIDOCAINE 50 MG/G
1 PATCH TOPICAL EVERY 24 HOURS
Qty: 30 PATCH | Refills: 0 | Status: SHIPPED | OUTPATIENT
Start: 2019-04-09 | End: 2019-05-16

## 2019-04-09 NOTE — PATIENT INSTRUCTIONS
1) Ice the lateral elbow 3-4x/day for 20 minutes at a time  2) Schedule EMG with my assistant up front  3) My office will call you to schedule injection once approved by insurance  4) Continue to use the strap and your home exercises program  5) Use the Delvis Aguero

## 2019-04-09 NOTE — TELEPHONE ENCOUNTER
[facility] of RIGHT lateral epicondyle steroid injection under ultrasound guidance cpt code 90868, [id number], . Talked to Darline Priest. who states no authorization is required. Reference #  [id number]. Will  inform Nursing.

## 2019-04-09 NOTE — H&P
[address] [place] [address] H&P    Requesting Physician: Rogelio Alonso DO    Chief Complaint (Reason for Visit):  Patient presents with:  Elbow Pain: Pt is present with Right Elbow Pain.  Pt was diagnosis with tennis Smoking status: Never Smoker      Smokeless tobacco: Never Used    Substance and Sexual Activity      Alcohol use: No        Alcohol/week: 0.0 oz      Drug use: No      Sexual activity: Not on file      CURRENT MEDICATIONS:     Current Outpatient Medicatio ttp Radius, ulna, olecranon process, distal biceps  ROM: intact to all planes of motion without reproducible pain   Strength: 5/5 in all myotomes of the BLUE  Sensation: Intact to light touch in all dermatomes of the lower extremities except decreased at R acute osseous injury of the right elbow. ASSESSMENT AND PLAN:  The patient is a pleasant 40-year-old female who is coming in complaining of right-sided lateral elbow pain with concomitant numbness and tingling in the right hand.   I do believe that she

## 2019-04-10 ENCOUNTER — TELEPHONE (OUTPATIENT)
Dept: NEUROLOGY | Facility: CLINIC | Age: 39
End: 2019-04-10

## 2019-04-10 NOTE — TELEPHONE ENCOUNTER
S/w patient who states she was given a note from Dr. Dennis Ramirez stating she cannot return to work until 04/15/19. Patient is requesting Dr. Ana Wolfe write her a note stating she cannot return to work until 04/22/19 since her arm is still really bothering her.  Tessa Thrasher

## 2019-04-11 ENCOUNTER — MED REC SCAN ONLY (OUTPATIENT)
Dept: NEUROLOGY | Facility: CLINIC | Age: 39
End: 2019-04-11

## 2019-04-11 NOTE — TELEPHONE ENCOUNTER
Note written. Please inform patient. Thanks    Maryellen Phillips.  Nu Moulton MD, [address] [facility]  Physical Medicine and Rehabilitation/Sports Medicine  [facility]

## 2019-04-16 ENCOUNTER — OFFICE VISIT (OUTPATIENT)
Dept: FAMILY MEDICINE CLINIC | Facility: CLINIC | Age: 39
End: 2019-04-16
Payer: COMMERCIAL

## 2019-04-16 VITALS
HEART RATE: 82 BPM | BODY MASS INDEX: 27.31 KG/M2 | DIASTOLIC BLOOD PRESSURE: 84 MMHG | TEMPERATURE: 97 F | WEIGHT: 160 LBS | SYSTOLIC BLOOD PRESSURE: 115 MMHG | HEIGHT: 64 IN

## 2019-04-16 DIAGNOSIS — M77.11 RIGHT TENNIS ELBOW: ICD-10-CM

## 2019-04-16 DIAGNOSIS — E55.9 VITAMIN D DEFICIENCY: ICD-10-CM

## 2019-04-16 DIAGNOSIS — Z88.6 ALLERGY TO NSAIDS: ICD-10-CM

## 2019-04-16 DIAGNOSIS — Z12.4 CERVICAL CANCER SCREENING: ICD-10-CM

## 2019-04-16 DIAGNOSIS — Z12.31 VISIT FOR SCREENING MAMMOGRAM: ICD-10-CM

## 2019-04-16 DIAGNOSIS — M54.50 ACUTE RIGHT-SIDED LOW BACK PAIN WITHOUT SCIATICA: ICD-10-CM

## 2019-04-16 DIAGNOSIS — Z00.00 ADULT GENERAL MEDICAL EXAM: Primary | ICD-10-CM

## 2019-04-16 DIAGNOSIS — E78.2 MIXED HYPERLIPIDEMIA: ICD-10-CM

## 2019-04-16 PROCEDURE — 99396 PREV VISIT EST AGE 40-64: CPT | Performed by: FAMILY MEDICINE

## 2019-04-16 PROCEDURE — 99212 OFFICE O/P EST SF 10 MIN: CPT | Performed by: FAMILY MEDICINE

## 2019-04-16 RX ORDER — METHOCARBAMOL 750 MG/1
750 TABLET, FILM COATED ORAL 3 TIMES DAILY PRN
Qty: 60 TABLET | Refills: 0 | Status: SHIPPED | OUTPATIENT
Start: 2019-04-16 | End: 2019-06-24

## 2019-04-16 NOTE — PROGRESS NOTES
Patient ID: Hua Negron is a 37year old female. 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       She has environm 4.8 (H) 07/05/2018    MOABSO 0.8 07/05/2018    EOABSO 0.4 07/05/2018    BAABSO 0.1 07/05/2018       Lab Results   Component Value Date     (H) 07/05/2018    BUN 13 07/05/2018    BUNCREA 16.7 07/05/2018    CREATSERUM 0.78 07/05/2018    ANIONGAP 8 07/ TSH (mIU/L)   Date Value   10/08/2016 1.32       Lab Results   Component Value Date    VITB12 861 11/05/2011       No results found for: IRON, IRONTOT    No results found for: SAT    No results found for: IRON, IRONTOT, TIBC, IRONSAT, TRANSFERRIN, TIBC Not on file      Years of education: Not on file      Highest education level: Not on file    Occupational History      Not on file    Social Needs      Financial resource strain: Not on file      Food insecurity:        Worry: Not on file        Inability Take 325 mg by mouth every 6 (six) hours as needed for Pain.  Disp:  Rfl:      Allergies:  Ibuprofen                   Comment:Other reaction(s): IBUPROFEN             Other reaction(s): IBUPROFEN   PHYSICAL EXAM:   Physical Exam  Physical Exam   Constituti orders for this visit:    Adult general medical exam  -     CBC WITH DIFFERENTIAL WITH PLATELET; Future  -     COMP METABOLIC PANEL (14); Future  -     ASSAY, THYROID STIM HORMONE; Future  -     VITAMIN D, 25-HYDROXY; Future  -     LIPID PANEL;  Future    R discharge instructions (if applicable) and agree that the record reflects my personal performance and is accurate and complete.   Lucie Lebron DO, 4/16/2019, 11:09 AM

## 2019-04-17 ENCOUNTER — OFFICE VISIT (OUTPATIENT)
Dept: NEUROLOGY | Facility: CLINIC | Age: 39
End: 2019-04-17
Payer: COMMERCIAL

## 2019-04-17 VITALS — DIASTOLIC BLOOD PRESSURE: 67 MMHG | HEART RATE: 78 BPM | RESPIRATION RATE: 17 BRPM | SYSTOLIC BLOOD PRESSURE: 110 MMHG

## 2019-04-17 DIAGNOSIS — M77.11 LATERAL EPICONDYLITIS OF RIGHT ELBOW: Primary | ICD-10-CM

## 2019-04-17 DIAGNOSIS — M25.521 RIGHT ELBOW PAIN: ICD-10-CM

## 2019-04-17 PROCEDURE — 20551 NJX 1 TENDON ORIGIN/INSJ: CPT | Performed by: PHYSICAL MEDICINE & REHABILITATION

## 2019-04-17 PROCEDURE — 76942 ECHO GUIDE FOR BIOPSY: CPT | Performed by: PHYSICAL MEDICINE & REHABILITATION

## 2019-04-17 RX ORDER — LIDOCAINE HYDROCHLORIDE 10 MG/ML
2 INJECTION, SOLUTION INFILTRATION; PERINEURAL ONCE
Status: COMPLETED | OUTPATIENT
Start: 2019-04-17 | End: 2019-04-17

## 2019-04-17 RX ORDER — TRIAMCINOLONE ACETONIDE 40 MG/ML
40 INJECTION, SUSPENSION INTRA-ARTICULAR; INTRAMUSCULAR ONCE
Status: COMPLETED | OUTPATIENT
Start: 2019-04-17 | End: 2019-04-17

## 2019-04-17 RX ORDER — LIDOCAINE HYDROCHLORIDE 10 MG/ML
5 INJECTION, SOLUTION INFILTRATION; PERINEURAL ONCE
Status: COMPLETED | OUTPATIENT
Start: 2019-04-17 | End: 2019-04-17

## 2019-04-17 RX ADMIN — LIDOCAINE HYDROCHLORIDE 2 ML: 10 INJECTION, SOLUTION INFILTRATION; PERINEURAL at 15:12:00

## 2019-04-17 RX ADMIN — LIDOCAINE HYDROCHLORIDE 5 ML: 10 INJECTION, SOLUTION INFILTRATION; PERINEURAL at 15:13:00

## 2019-04-17 RX ADMIN — TRIAMCINOLONE ACETONIDE 40 MG: 40 INJECTION, SUSPENSION INTRA-ARTICULAR; INTRAMUSCULAR at 15:14:00

## 2019-04-17 NOTE — PROCEDURES
[address]   Lateral Epicondyle Tendon Injection Procedure Note    CHIEF COMPLAINT:  Patient presents with:   Injection: LOV 04/09/19 Pt is present to have right elbow injection       PROCEDURE PERFORMED:  RI patient tolerated the procedure well. No immediate complications were noted. Prior to the injection, pain was rated 5/10 in intensity. After the injection, pain was rated at a 0/10 in intensity.     Patient verbalized understanding of assessment and plan

## 2019-04-19 ENCOUNTER — PROCEDURE VISIT (OUTPATIENT)
Dept: NEUROLOGY | Facility: CLINIC | Age: 39
End: 2019-04-19
Payer: COMMERCIAL

## 2019-04-19 ENCOUNTER — LAB ENCOUNTER (OUTPATIENT)
Dept: LAB | Age: 39
End: 2019-04-19
Attending: FAMILY MEDICINE
Payer: COMMERCIAL

## 2019-04-19 DIAGNOSIS — Z00.00 ADULT GENERAL MEDICAL EXAM: ICD-10-CM

## 2019-04-19 DIAGNOSIS — E78.2 MIXED HYPERLIPIDEMIA: ICD-10-CM

## 2019-04-19 DIAGNOSIS — R20.2 NUMBNESS AND TINGLING OF RIGHT HAND: Primary | ICD-10-CM

## 2019-04-19 DIAGNOSIS — R20.0 NUMBNESS AND TINGLING OF RIGHT HAND: Primary | ICD-10-CM

## 2019-04-19 DIAGNOSIS — E55.9 VITAMIN D DEFICIENCY: ICD-10-CM

## 2019-04-19 DIAGNOSIS — G56.03 BILATERAL CARPAL TUNNEL SYNDROME: ICD-10-CM

## 2019-04-19 PROCEDURE — 85025 COMPLETE CBC W/AUTO DIFF WBC: CPT

## 2019-04-19 PROCEDURE — 82306 VITAMIN D 25 HYDROXY: CPT

## 2019-04-19 PROCEDURE — 80053 COMPREHEN METABOLIC PANEL: CPT

## 2019-04-19 PROCEDURE — 95911 NRV CNDJ TEST 9-10 STUDIES: CPT | Performed by: PHYSICAL MEDICINE & REHABILITATION

## 2019-04-19 PROCEDURE — 80061 LIPID PANEL: CPT

## 2019-04-19 PROCEDURE — L3908 WHO COCK-UP NONMOLDE PRE OTS: HCPCS | Performed by: PHYSICAL MEDICINE & REHABILITATION

## 2019-04-19 PROCEDURE — 36415 COLL VENOUS BLD VENIPUNCTURE: CPT

## 2019-04-19 PROCEDURE — 84443 ASSAY THYROID STIM HORMONE: CPT

## 2019-04-19 PROCEDURE — 95887 MUSC TST DONE W/N TST NONEXT: CPT | Performed by: PHYSICAL MEDICINE & REHABILITATION

## 2019-04-19 PROCEDURE — 95886 MUSC TEST DONE W/N TEST COMP: CPT | Performed by: PHYSICAL MEDICINE & REHABILITATION

## 2019-04-19 NOTE — PROCEDURES
[address]   Nerve Conduction Study and Electromyography Procedure Note      Patient: Filipe Patterson Hand Dominance: Right   Patient ID: [id number] Referring Dr: Dona Nolasco  Sex: Female Test : Dona Nolasco  Date o R Ulnar - Digit V (Antidromic)      Wrist Dig V 2.19 2.92 66.6 106.0 Wrist - Dig V 13 59   R Dorsal ulnar cutaneous      Forearm Hand dorsum 1.56 2.14 51.9 32.2 Forearm - Hand dorsum 9.5 61       EMG Summary Table     Spontaneous MUAP Recruitment   Muscle The needle EMG study was normal in all 7 tested muscles: R. Trapezius (upper), R. Deltoid, R. Biceps brachii, R. Triceps brachii, R. Flexor carpi radialis, R. First dorsal interosseous, R. Abductor pollicis brevis.       Conclusion  This is a abnormal study

## 2019-04-24 ENCOUNTER — PATIENT MESSAGE (OUTPATIENT)
Dept: FAMILY MEDICINE CLINIC | Facility: CLINIC | Age: 39
End: 2019-04-24

## 2019-04-25 NOTE — TELEPHONE ENCOUNTER
From: Jerri Dela Cruz  To:  Nannette Abrams DO  Sent: 4/24/2019 3:48 PM CDT  Subject: Visit Follow-up Question    Dear doctor Tony Woodard, please note that I was diagnosed with Carpal Tunnel by doctor Alise Manzo on 4/19/2019, however the diagnosis is not vi

## 2019-05-01 ENCOUNTER — OFFICE VISIT (OUTPATIENT)
Dept: FAMILY MEDICINE CLINIC | Facility: CLINIC | Age: 39
End: 2019-05-01
Payer: OTHER MISCELLANEOUS

## 2019-05-01 VITALS
HEIGHT: 64 IN | BODY MASS INDEX: 26.63 KG/M2 | WEIGHT: 156 LBS | HEART RATE: 103 BPM | DIASTOLIC BLOOD PRESSURE: 73 MMHG | SYSTOLIC BLOOD PRESSURE: 122 MMHG

## 2019-05-01 DIAGNOSIS — M77.11 LATERAL EPICONDYLITIS OF BOTH ELBOWS: ICD-10-CM

## 2019-05-01 DIAGNOSIS — G56.03 BILATERAL CARPAL TUNNEL SYNDROME: Primary | ICD-10-CM

## 2019-05-01 DIAGNOSIS — M77.12 LATERAL EPICONDYLITIS OF BOTH ELBOWS: ICD-10-CM

## 2019-05-01 PROCEDURE — 99213 OFFICE O/P EST LOW 20 MIN: CPT | Performed by: NURSE PRACTITIONER

## 2019-05-01 PROCEDURE — 99212 OFFICE O/P EST SF 10 MIN: CPT | Performed by: NURSE PRACTITIONER

## 2019-05-01 NOTE — PROGRESS NOTES
HPI  Pt presents for f/u bilat carpal tunnel syndrome-sees Dr Laina Arteaga. Is not wearing splints during day as she needs a note for work in  Order to wear them     Also needs note outlining restrictions for work.   Works at [employer] and often works 14-16 Medical: Not on file        Non-medical: Not on file    Tobacco Use      Smoking status: Never Smoker      Smokeless tobacco: Never Used    Substance and Sexual Activity      Alcohol use: No        Alcohol/week: 0.0 oz      Drug use: No      Sexual acti Allergies:    Ibuprofen                   Comment:Other reaction(s): IBUPROFEN             Other reaction(s): IBUPROFEN    Physical Exam   Nursing note and vitals reviewed. Constitutional: She is oriented to person, place, and time.  She appears we concerns. Encouraged to sign up for My Chart if not already registered.

## 2019-05-01 NOTE — ASSESSMENT & PLAN NOTE
F/u Dr Nu Moulton  con't PT  Wear splints at all times ex when washing  Enc to use ergonomic supports and dragon dictation  Note provided for restrictions    Please call if symptoms worsen or are not resolving.

## 2019-05-01 NOTE — ASSESSMENT & PLAN NOTE
F/u Dr Laina Arteaga  con't PT  Wear splints at all times ex when washing  Enc to use ergonomic supports and dragon dictation  Note provided for restrictions    Please call if symptoms worsen or are not resolving.

## 2019-05-16 ENCOUNTER — OFFICE VISIT (OUTPATIENT)
Dept: NEUROLOGY | Facility: CLINIC | Age: 39
End: 2019-05-16
Payer: COMMERCIAL

## 2019-05-16 ENCOUNTER — TELEPHONE (OUTPATIENT)
Dept: NEUROLOGY | Facility: CLINIC | Age: 39
End: 2019-05-16

## 2019-05-16 VITALS
DIASTOLIC BLOOD PRESSURE: 80 MMHG | SYSTOLIC BLOOD PRESSURE: 102 MMHG | HEIGHT: 64 IN | WEIGHT: 156 LBS | HEART RATE: 85 BPM | BODY MASS INDEX: 26.63 KG/M2 | RESPIRATION RATE: 17 BRPM

## 2019-05-16 DIAGNOSIS — R20.0 NUMBNESS AND TINGLING IN RIGHT HAND: Primary | ICD-10-CM

## 2019-05-16 DIAGNOSIS — R20.2 NUMBNESS AND TINGLING IN RIGHT HAND: Primary | ICD-10-CM

## 2019-05-16 DIAGNOSIS — G56.01 CARPAL TUNNEL SYNDROME OF RIGHT WRIST: ICD-10-CM

## 2019-05-16 DIAGNOSIS — M77.11 LATERAL EPICONDYLITIS OF RIGHT ELBOW: ICD-10-CM

## 2019-05-16 PROCEDURE — 99214 OFFICE O/P EST MOD 30 MIN: CPT | Performed by: PHYSICAL MEDICINE & REHABILITATION

## 2019-05-16 RX ORDER — LIDOCAINE 50 MG/G
1 PATCH TOPICAL EVERY 24 HOURS
Qty: 30 PATCH | Refills: 1 | Status: SHIPPED | OUTPATIENT
Start: 2019-05-16 | End: 2020-08-29

## 2019-05-16 RX ORDER — GABAPENTIN 100 MG/1
100 CAPSULE ORAL NIGHTLY
Qty: 30 CAPSULE | Refills: 0 | Status: SHIPPED | OUTPATIENT
Start: 2019-05-16 | End: 2019-06-11

## 2019-05-16 NOTE — PATIENT INSTRUCTIONS
1) OK to use dictation to type while at work. Suggest not working more than 40 hours per week for 2 months while Geeta's medical injury heals.   2) Get XR of the cervical spine  3) Schedule appointment for RIGHT carpal tunnel injection once approved by [employer]

## 2019-05-16 NOTE — PROGRESS NOTES
[address]  Progress Note    CHIEF COMPLAINT:  Patient presents with:  Elbow Pain: LOV 04/17/19 Pt states that shes doing a little better.  Pt states that she has lost sensation in her right thumb       Histo Rfl: 1   gabapentin 100 MG Oral Cap Take 1 capsule (100 mg total) by mouth nightly. Disp: 30 capsule Rfl: 0   Cholecalciferol (VITAMIN D-3) 1000 units Oral Cap Take 1,000 mg by mouth daily.  Disp: 90 capsule Rfl: 1   atorvastatin (LIPITOR) 20 MG Oral Tab Ta all planes of motion of cervical spine including side-bend bilaterally, rotation bilaterally, flexion, and extension   Strength: 5/5 in all myotomes of the BILATERAL upper extremities   Sensation: Intact to light touch in all dermatomes of the BILATERAL up 30 - 149 mg/dL Final   • LDL Cholesterol 04/19/2019 209* <100 mg/dL Final   • VLDL 04/19/2019 20  0 - 30 mg/dL Final   • Non HDL Chol 04/19/2019 229* <130 mg/dL Final   • FASTING 04/19/2019 Yes   Final   • WBC 04/19/2019 8.5  4.0 - 11.0 x10(3) uL Final   • VIEWS), RIGHT (CPT=73080)     COMPARISON: [facility], XR WRIST COMPLETE (MIN 3 VIEWS), RIGHT (CPT=73110), 11/27/2017, 16:03. INDICATIONS:  General right elbow pain x 3 weeks. No known injury. TECHNIQUE:    4 views were obtained. right lateral epicondyle and common extensor tendon. As her EMG does show moderate carpal tunnel syndrome on the right, we will perform a corticosteroid injection the right carpal tunnel under ultrasound guidance.   I also given her prescription for Karen

## 2019-05-16 NOTE — TELEPHONE ENCOUNTER
Called [facility] to check on authorization of approval for Right carpal tunnel CSI under ultrasound guidance. Spoke to [employer] at [employer] who states  Authorization is not required.  Reference #[id number]

## 2019-05-24 ENCOUNTER — MED REC SCAN ONLY (OUTPATIENT)
Dept: NEUROLOGY | Facility: CLINIC | Age: 39
End: 2019-05-24

## 2019-06-06 ENCOUNTER — MED REC SCAN ONLY (OUTPATIENT)
Dept: NEUROLOGY | Facility: CLINIC | Age: 39
End: 2019-06-06

## 2019-06-11 ENCOUNTER — TELEPHONE (OUTPATIENT)
Dept: NEUROLOGY | Facility: CLINIC | Age: 39
End: 2019-06-11

## 2019-06-11 ENCOUNTER — OFFICE VISIT (OUTPATIENT)
Dept: FAMILY MEDICINE CLINIC | Facility: CLINIC | Age: 39
End: 2019-06-11
Payer: OTHER MISCELLANEOUS

## 2019-06-11 VITALS
WEIGHT: 160 LBS | SYSTOLIC BLOOD PRESSURE: 113 MMHG | DIASTOLIC BLOOD PRESSURE: 77 MMHG | HEIGHT: 64 IN | BODY MASS INDEX: 27.31 KG/M2 | HEART RATE: 68 BPM

## 2019-06-11 DIAGNOSIS — Z88.6 ALLERGY TO NSAIDS: ICD-10-CM

## 2019-06-11 DIAGNOSIS — R20.2 NUMBNESS AND TINGLING IN RIGHT HAND: ICD-10-CM

## 2019-06-11 DIAGNOSIS — M77.11 LATERAL EPICONDYLITIS OF BOTH ELBOWS: ICD-10-CM

## 2019-06-11 DIAGNOSIS — M77.12 LATERAL EPICONDYLITIS OF BOTH ELBOWS: ICD-10-CM

## 2019-06-11 DIAGNOSIS — R20.0 NUMBNESS AND TINGLING IN RIGHT HAND: ICD-10-CM

## 2019-06-11 DIAGNOSIS — G56.03 BILATERAL CARPAL TUNNEL SYNDROME: ICD-10-CM

## 2019-06-11 DIAGNOSIS — T39.315D ADVERSE EFFECT OF IBUPROFEN, SUBSEQUENT ENCOUNTER: Primary | ICD-10-CM

## 2019-06-11 PROBLEM — J02.9 PHARYNGITIS: Status: RESOLVED | Noted: 2018-12-11 | Resolved: 2019-06-11

## 2019-06-11 PROCEDURE — 99212 OFFICE O/P EST SF 10 MIN: CPT | Performed by: NURSE PRACTITIONER

## 2019-06-11 PROCEDURE — 99213 OFFICE O/P EST LOW 20 MIN: CPT | Performed by: NURSE PRACTITIONER

## 2019-06-11 NOTE — PROGRESS NOTES
HPI  Pt here for f/u bilat carpal tunnel syndrome-Has been wearing bilat wrist splints. Had right elbow injection. Was given gabapentin and after 2nd dose passed out. Is having increase in numbness to right thumb.  Has increase in popping and cracking in education: Not on file      Highest education level: Not on file    Occupational History      Not on file    Social Needs      Financial resource strain: Not on file      Food insecurity:        Worry: Not on file        Inability: Not on file      Transpo total) by mouth nightly. For cholesterol. Disp: 90 tablet Rfl: 1   acetaminophen 325 MG Oral Tab Take 325 mg by mouth every 6 (six) hours as needed for Pain.  Disp:  Rfl:        Allergies:    Ibuprofen                   Comment:Other reaction(s): IBUPROFEN

## 2019-06-17 RX ORDER — GABAPENTIN 100 MG/1
CAPSULE ORAL
Qty: 30 CAPSULE | Refills: 0 | OUTPATIENT
Start: 2019-06-17

## 2019-06-18 ENCOUNTER — TELEPHONE (OUTPATIENT)
Dept: NEUROLOGY | Facility: CLINIC | Age: 39
End: 2019-06-18

## 2019-06-18 ENCOUNTER — OFFICE VISIT (OUTPATIENT)
Dept: NEUROLOGY | Facility: CLINIC | Age: 39
End: 2019-06-18
Payer: COMMERCIAL

## 2019-06-18 VITALS — DIASTOLIC BLOOD PRESSURE: 80 MMHG | HEART RATE: 79 BPM | SYSTOLIC BLOOD PRESSURE: 132 MMHG | RESPIRATION RATE: 17 BRPM

## 2019-06-18 DIAGNOSIS — G56.12 MONONEUROPATHY OF LEFT MEDIAN NERVE: ICD-10-CM

## 2019-06-18 DIAGNOSIS — G56.11 MEDIAN MONONEUROPATHY, RIGHT: ICD-10-CM

## 2019-06-18 DIAGNOSIS — G56.03 BILATERAL CARPAL TUNNEL SYNDROME: Primary | ICD-10-CM

## 2019-06-18 PROCEDURE — 20526 THER INJECTION CARP TUNNEL: CPT | Performed by: PHYSICAL MEDICINE & REHABILITATION

## 2019-06-18 PROCEDURE — 76942 ECHO GUIDE FOR BIOPSY: CPT | Performed by: PHYSICAL MEDICINE & REHABILITATION

## 2019-06-18 RX ORDER — LIDOCAINE HYDROCHLORIDE 10 MG/ML
3 INJECTION, SOLUTION INFILTRATION; PERINEURAL ONCE
Status: COMPLETED | OUTPATIENT
Start: 2019-06-18 | End: 2019-06-18

## 2019-06-18 RX ORDER — LIDOCAINE HYDROCHLORIDE 10 MG/ML
2 INJECTION, SOLUTION INFILTRATION; PERINEURAL ONCE
Status: COMPLETED | OUTPATIENT
Start: 2019-06-18 | End: 2019-06-18

## 2019-06-18 RX ORDER — TRIAMCINOLONE ACETONIDE 40 MG/ML
40 INJECTION, SUSPENSION INTRA-ARTICULAR; INTRAMUSCULAR ONCE
Status: COMPLETED | OUTPATIENT
Start: 2019-06-18 | End: 2019-06-18

## 2019-06-18 RX ADMIN — TRIAMCINOLONE ACETONIDE 40 MG: 40 INJECTION, SUSPENSION INTRA-ARTICULAR; INTRAMUSCULAR at 10:59:00

## 2019-06-18 RX ADMIN — LIDOCAINE HYDROCHLORIDE 2 ML: 10 INJECTION, SOLUTION INFILTRATION; PERINEURAL at 10:58:00

## 2019-06-18 RX ADMIN — LIDOCAINE HYDROCHLORIDE 3 ML: 10 INJECTION, SOLUTION INFILTRATION; PERINEURAL at 10:55:00

## 2019-06-18 RX ADMIN — LIDOCAINE HYDROCHLORIDE 3 ML: 10 INJECTION, SOLUTION INFILTRATION; PERINEURAL at 10:56:00

## 2019-06-18 RX ADMIN — TRIAMCINOLONE ACETONIDE 40 MG: 40 INJECTION, SUSPENSION INTRA-ARTICULAR; INTRAMUSCULAR at 10:58:00

## 2019-06-18 RX ADMIN — LIDOCAINE HYDROCHLORIDE 2 ML: 10 INJECTION, SOLUTION INFILTRATION; PERINEURAL at 10:57:00

## 2019-06-18 NOTE — PROCEDURES
[address]  Carpal Tunnel Injection Procedure Note    CHIEF COMPLAINT:  Patient presents with:   Injection: Carpal Tunnel Injection       PROCEDURE PERFORMED:  BILATERAL carpal tunnel corticosteroid injecti mg/dL Final   • Triglycerides 04/19/2019 98  30 - 149 mg/dL Final   • LDL Cholesterol 04/19/2019 209* <100 mg/dL Final   • VLDL 04/19/2019 20  0 - 30 mg/dL Final   • Non HDL Chol 04/19/2019 229* <130 mg/dL Final   • FASTING 04/19/2019 Yes   Final   • WBC 0 The needle was inserted using 1.2 cc of 1% lidocaine to anesthetize the skin and soft tissue.   Once the needle was seen abutting the LEFT median nerve, a mixture of 2 cc 1% lidocaine and 1 cc of kenalog containing 40 mg of corticosteroid was visualized be evaluation\"    The patient will follow-up with me in sometime in July to discuss the left medial elbow. Rocio Hay.  Mariana Swanson MD, [facility]  Physical Medicine and Rehabilitation/Sports Medicine  [facility]

## 2019-06-18 NOTE — TELEPHONE ENCOUNTER
Called Rachel for authorization of approval of LEFT carpal tunnel injection under ultrasound guidance 35569,19382, . Spoke to [address] [place] [address] who states No prior authorization is required. Reference #[id number].

## 2019-06-24 ENCOUNTER — TELEPHONE (OUTPATIENT)
Dept: FAMILY MEDICINE CLINIC | Facility: CLINIC | Age: 39
End: 2019-06-24

## 2019-06-24 ENCOUNTER — OFFICE VISIT (OUTPATIENT)
Dept: FAMILY MEDICINE CLINIC | Facility: CLINIC | Age: 39
End: 2019-06-24
Payer: OTHER MISCELLANEOUS

## 2019-06-24 ENCOUNTER — HOSPITAL ENCOUNTER (OUTPATIENT)
Dept: GENERAL RADIOLOGY | Age: 39
Discharge: HOME | End: 2019-06-24
Attending: PHYSICAL MEDICINE & REHABILITATION
Payer: COMMERCIAL

## 2019-06-24 ENCOUNTER — TELEPHONE (OUTPATIENT)
Dept: ADMINISTRATIVE | Age: 39
End: 2019-06-24

## 2019-06-24 VITALS
DIASTOLIC BLOOD PRESSURE: 85 MMHG | HEART RATE: 90 BPM | HEIGHT: 64 IN | BODY MASS INDEX: 27.18 KG/M2 | SYSTOLIC BLOOD PRESSURE: 125 MMHG | TEMPERATURE: 98 F | WEIGHT: 159.19 LBS

## 2019-06-24 DIAGNOSIS — G56.22 ULNAR NEURITIS, LEFT: ICD-10-CM

## 2019-06-24 DIAGNOSIS — M77.02 MEDIAL EPICONDYLITIS, LEFT: ICD-10-CM

## 2019-06-24 DIAGNOSIS — R20.2 NUMBNESS AND TINGLING IN RIGHT HAND: ICD-10-CM

## 2019-06-24 DIAGNOSIS — G56.03 BILATERAL CARPAL TUNNEL SYNDROME: Primary | ICD-10-CM

## 2019-06-24 DIAGNOSIS — M77.11 RIGHT TENNIS ELBOW: ICD-10-CM

## 2019-06-24 DIAGNOSIS — M54.50 ACUTE RIGHT-SIDED LOW BACK PAIN WITHOUT SCIATICA: ICD-10-CM

## 2019-06-24 DIAGNOSIS — M77.11 LATERAL EPICONDYLITIS, RIGHT ELBOW: ICD-10-CM

## 2019-06-24 DIAGNOSIS — M77.02 MEDIAL EPICONDYLITIS, LEFT: Primary | ICD-10-CM

## 2019-06-24 DIAGNOSIS — R20.0 NUMBNESS AND TINGLING IN RIGHT HAND: ICD-10-CM

## 2019-06-24 DIAGNOSIS — R20.0 NUMBNESS OF RIGHT THUMB: ICD-10-CM

## 2019-06-24 PROCEDURE — 72050 X-RAY EXAM NECK SPINE 4/5VWS: CPT | Performed by: PHYSICAL MEDICINE & REHABILITATION

## 2019-06-24 PROCEDURE — 99212 OFFICE O/P EST SF 10 MIN: CPT | Performed by: FAMILY MEDICINE

## 2019-06-24 PROCEDURE — 99215 OFFICE O/P EST HI 40 MIN: CPT | Performed by: FAMILY MEDICINE

## 2019-06-24 RX ORDER — METHOCARBAMOL 750 MG/1
750 TABLET, FILM COATED ORAL 3 TIMES DAILY PRN
Qty: 60 TABLET | Refills: 0 | Status: SHIPPED | OUTPATIENT
Start: 2019-06-24 | End: 2019-07-04

## 2019-06-24 NOTE — TELEPHONE ENCOUNTER
Pt states just saw VS for OV and believes he was going to send Rx for pain medication but per the AVS it does not appear VS sent Rx. Asking for pain medication to be sent. Verified allergies and pharmacy on file.

## 2019-06-24 NOTE — PROGRESS NOTES
Patient ID: Aleah Brantley is a 37year old female. HPI  Patient presents with:  Elbow Pain  Wrist Pain    Works at [employer]. Uses Dragon dictation system currently for typing. She types a lot at work. Last went to work on 5/1/19.  She is cu change. Respiratory: Negative for chest tightness and shortness of breath. Cardiovascular: Negative for chest pain. Gastrointestinal: Negative for abdominal pain. Musculoskeletal: Positive for arthralgias (elbow and wrist pain bilaterally).    Ski warm.   Psychiatry: Normal mood and affect   Upper extremities: Mild tenderness at lateral epichondyl on the right. Moderate tenderness at medial epichondyl on the left.  Positive Tinel's at ulnar gutter of left elbow with tingling into the fifth and fourth Matt Saravia  6/24/2019      By signing my name below, Ltaoya Diaz,  attest that this documentation has been prepared under the direction and in the presence of Dell Quiros DO.    Electronically Signed: José Luis Hathaway, 6/24/2019, 1:20 PM.    Madelin LANTIGUA

## 2019-06-24 NOTE — TELEPHONE ENCOUNTER
Pt. scheduled via [employer].     Appointment For: Anthony Ricky ([id number])   Visit Type: MYCHART FOLLOW UP (9717)      6/24/2019    1:40 PM  10 mins.  Sergio Ford DO [facility]      Patient Comments:   Chronic pain on both elbows and

## 2019-06-25 NOTE — TELEPHONE ENCOUNTER
Noted medication was sent by VS yesterday:    methocarbamol 750 MG Oral Tab 60 tablet 0 6/24/2019 7/4/2019    Sig - Route: Take 1 tablet (750 mg total) by mouth 3 (three) times daily as needed. For muscle relaxation.  Can possibly make you tired.  - Oral

## 2019-06-25 NOTE — TELEPHONE ENCOUNTER
Disability form for Dr. Ange Delarosa received in Forms dept+ FCR+ Signed release, paid $25 w/ . Logged for processing.  NK

## 2019-06-27 ENCOUNTER — MED REC SCAN ONLY (OUTPATIENT)
Dept: NEUROLOGY | Facility: CLINIC | Age: 39
End: 2019-06-27

## 2019-07-05 NOTE — TELEPHONE ENCOUNTER
Dr. Arleta Sicard form - Pt has been off work since 4/1/19 til pending release by Dr. Tatyana Cruz    Please sign off on form:  -Highlight the patient and hit \"Chart\" button.   -In Chart Review, w/in the Encounter tab - click 1 time on the Telephone

## 2019-07-09 ENCOUNTER — TELEPHONE (OUTPATIENT)
Dept: FAMILY MEDICINE CLINIC | Facility: CLINIC | Age: 39
End: 2019-07-09

## 2019-07-09 NOTE — TELEPHONE ENCOUNTER
She would need to be seen for this form to be filled out or otherwise she can see 1 of the other specialist that she has been seeing.

## 2019-07-09 NOTE — TELEPHONE ENCOUNTER
Patient dropped off a medical evalutation form to be completed, says form is due in five days, please advise.

## 2019-07-09 NOTE — TELEPHONE ENCOUNTER
Pt picked up disability forms with [employer] orig forms. Left a note to have Disab form faxed to Tracy Garcia - [phone]. Confirmed with pt that notes will also be faxed with the disability form and pt understood. Request complete.

## 2019-07-09 NOTE — TELEPHONE ENCOUNTER
Faxed Disab form to [address] - [phone]. Fax failed. Pt will  forms at [facility] - . Notified via [address] [place] [address] [address].

## 2019-07-10 ENCOUNTER — OFFICE VISIT (OUTPATIENT)
Dept: FAMILY MEDICINE CLINIC | Facility: CLINIC | Age: 39
End: 2019-07-10
Payer: COMMERCIAL

## 2019-07-10 VITALS
BODY MASS INDEX: 27.31 KG/M2 | HEART RATE: 86 BPM | HEIGHT: 64 IN | SYSTOLIC BLOOD PRESSURE: 118 MMHG | DIASTOLIC BLOOD PRESSURE: 78 MMHG | WEIGHT: 160 LBS

## 2019-07-10 DIAGNOSIS — G56.03 BILATERAL CARPAL TUNNEL SYNDROME: Primary | ICD-10-CM

## 2019-07-10 PROCEDURE — 99214 OFFICE O/P EST MOD 30 MIN: CPT | Performed by: NURSE PRACTITIONER

## 2019-07-10 NOTE — PROGRESS NOTES
HPI  Pt presents for form completion for her job-due tomorrow. Has been off of work the past couple of months due to chronic carpal tunnel syndrome. Wears wrist braces prn, sees Dr Suki Bravo; has had emg and cortisone injections.  S/s are not improving    Revi No        Alcohol/week: 0.0 oz      Drug use: No      Sexual activity: Not on file    Lifestyle      Physical activity:        Days per week: Not on file        Minutes per session: Not on file      Stress: Not on file    Relationships      Social connecti Pulmonary/Chest: Effort normal. No respiratory distress. Musculoskeletal: She exhibits tenderness. Right elbow: She exhibits decreased range of motion. Tenderness found. Right wrist: She exhibits decreased range of motion and tenderness.

## 2019-07-12 ENCOUNTER — TELEPHONE (OUTPATIENT)
Dept: FAMILY MEDICINE CLINIC | Facility: CLINIC | Age: 39
End: 2019-07-12

## 2019-07-12 RX ORDER — GABAPENTIN 100 MG/1
CAPSULE ORAL
Qty: 30 CAPSULE | Refills: 0 | OUTPATIENT
Start: 2019-07-12

## 2019-07-12 NOTE — TELEPHONE ENCOUNTER
Faxed Office notes from 4/1/19 - 7/10/19 to [facility] - [phone]. Mailed copies to pt.

## 2019-07-12 NOTE — TELEPHONE ENCOUNTER
Medical eval form filled out by provider and they gave orig to pt to have pt fax/take to [facility] office. I will fax the requested notes to the office. HIPAA signed.

## 2019-07-18 ENCOUNTER — OFFICE VISIT (OUTPATIENT)
Dept: NEUROLOGY | Facility: CLINIC | Age: 39
End: 2019-07-18
Payer: COMMERCIAL

## 2019-07-18 ENCOUNTER — TELEPHONE (OUTPATIENT)
Dept: NEUROLOGY | Facility: CLINIC | Age: 39
End: 2019-07-18

## 2019-07-18 VITALS
BODY MASS INDEX: 27.31 KG/M2 | RESPIRATION RATE: 17 BRPM | WEIGHT: 160 LBS | DIASTOLIC BLOOD PRESSURE: 80 MMHG | HEIGHT: 64 IN | SYSTOLIC BLOOD PRESSURE: 120 MMHG | HEART RATE: 78 BPM

## 2019-07-18 DIAGNOSIS — M77.11 LATERAL EPICONDYLITIS OF RIGHT ELBOW: Primary | ICD-10-CM

## 2019-07-18 DIAGNOSIS — M77.02 MEDIAL EPICONDYLITIS OF LEFT ELBOW: ICD-10-CM

## 2019-07-18 DIAGNOSIS — G56.03 BILATERAL CARPAL TUNNEL SYNDROME: ICD-10-CM

## 2019-07-18 PROCEDURE — 99214 OFFICE O/P EST MOD 30 MIN: CPT | Performed by: PHYSICAL MEDICINE & REHABILITATION

## 2019-07-18 RX ORDER — TRAMADOL HYDROCHLORIDE 50 MG/1
50 TABLET ORAL EVERY 6 HOURS PRN
Qty: 10 TABLET | Refills: 0 | Status: SHIPPED | OUTPATIENT
Start: 2019-07-18 | End: 2019-11-25 | Stop reason: ALTCHOICE

## 2019-07-18 RX ORDER — TRAMADOL HYDROCHLORIDE 50 MG/1
50 TABLET ORAL EVERY 6 HOURS PRN
Qty: 90 TABLET | Refills: 0 | Status: SHIPPED | OUTPATIENT
Start: 2019-07-18 | End: 2019-07-18

## 2019-07-18 RX ORDER — BIOTIN 1 MG
TABLET ORAL
Refills: 0 | COMMUNITY
Start: 2019-06-24 | End: 2020-03-09

## 2019-07-18 NOTE — PROGRESS NOTES
[address]  Progress Note    CHIEF COMPLAINT:  Patient presents with:  Wrist Pain: LOV 06/18/19 Pt states that the pain is up and down. Some days its better and some days its not.        History of Present Il Sexual Activity      Alcohol use: No        Alcohol/week: 0.0 oz      Drug use: No      Sexual activity: Not on file      FAMILY HISTORY:   Family History   Problem Relation Age of Onset   • Other (Other) Mother         cholesterol   • Cancer Brother No lesions or ulcerations  Heart: peripheral pulses intact. Normal capillary refill. Lungs: Non-labored respirations  Abdomen: No abdominal guarding  Extremities: No lower extremity edema bilaterally   Skin: No lesions noted.    Cognition: alert & oriente Total Protein 04/19/2019 8.1  6.4 - 8.2 g/dL Final   • Albumin 04/19/2019 4.0  3.4 - 5.0 g/dL Final   • Globulin  04/19/2019 4.1  2.8 - 4.4 g/dL Final   • A/G Ratio 04/19/2019 1.0  1.0 - 2.0 Final   • TSH 04/19/2019 1.120  0.358 - 3.740 mIU/mL Final   • Vi the patient her X-ray of the cervical spine   XR CERVICAL SPINE AP LAT FLEX EXT EM (CPT=72050)  Narrative: PROCEDURE: XR CERVICAL SPINE AP LAT FLEX EXT EM (CPT=72050)     COMPARISON: [facility], X CERV AP LAT, 5/29/2012, 23:20.      INDICATI MD on 11/27/2017 at 16:38        =====  CONCLUSION:      No radiographically visible acute osseous injury of the right elbow.         EMG of the bilateral upper extremities performed on 4/19/2019  Conclusion  This is a abnormal study.     There is electrod documented in AVS summary. The patient was in agreement with the assessment and plan. All questions were answered. There were no barriers to learning.     I have spent 26 minutes discussing the key components of this visit including the history, physical

## 2019-07-18 NOTE — TELEPHONE ENCOUNTER
[employer] for authorization of approval for RIGHT lateral epicondyle and LEFT medial epicondyle PRP injection cpt code 0232T. It is NOT a covered benefit. Will inform Nursing.

## 2019-07-18 NOTE — TELEPHONE ENCOUNTER
During office visit patient was upset with the communication she had with  staff. Patient states she called to inform office she was running late. PSR informed patient of late arrival policy. Patient expressed she would still come to the office.

## 2019-07-18 NOTE — PATIENT INSTRUCTIONS
1) Please see Khushi Erwin for surgical consultation (BILATERAL Carpal tunnel)  2) I put order in for PRP injections of RIGHT lateral epicondyle and LEFT medial epicondyle.  My office will call you if approved by your insurance or workman's comp  3) Take T

## 2019-08-07 ENCOUNTER — TELEPHONE (OUTPATIENT)
Dept: NEUROLOGY | Facility: CLINIC | Age: 39
End: 2019-08-07

## 2019-08-15 ENCOUNTER — TELEPHONE (OUTPATIENT)
Dept: FAMILY MEDICINE CLINIC | Facility: CLINIC | Age: 39
End: 2019-08-15

## 2019-08-16 ENCOUNTER — APPOINTMENT (OUTPATIENT)
Dept: CT IMAGING | Facility: HOSPITAL | Age: 39
End: 2019-08-16
Attending: EMERGENCY MEDICINE
Payer: COMMERCIAL

## 2019-08-16 ENCOUNTER — APPOINTMENT (OUTPATIENT)
Dept: ULTRASOUND IMAGING | Facility: HOSPITAL | Age: 39
End: 2019-08-16
Attending: EMERGENCY MEDICINE
Payer: COMMERCIAL

## 2019-08-16 ENCOUNTER — HOSPITAL ENCOUNTER (EMERGENCY)
Facility: HOSPITAL | Age: 39
Discharge: HOME | End: 2019-08-16
Attending: EMERGENCY MEDICINE
Payer: COMMERCIAL

## 2019-08-16 VITALS
WEIGHT: 159.19 LBS | HEART RATE: 88 BPM | BODY MASS INDEX: 27.18 KG/M2 | DIASTOLIC BLOOD PRESSURE: 78 MMHG | TEMPERATURE: 98 F | RESPIRATION RATE: 16 BRPM | SYSTOLIC BLOOD PRESSURE: 124 MMHG | OXYGEN SATURATION: 97 % | HEIGHT: 64 IN

## 2019-08-16 DIAGNOSIS — N73.0 PID (ACUTE PELVIC INFLAMMATORY DISEASE): ICD-10-CM

## 2019-08-16 DIAGNOSIS — N30.00 ACUTE CYSTITIS WITHOUT HEMATURIA: Primary | ICD-10-CM

## 2019-08-16 LAB
ANION GAP SERPL CALC-SCNC: 7 MMOL/L (ref 0–18)
B-HCG UR QL: NEGATIVE
BASOPHILS # BLD AUTO: 0.04 X10(3) UL (ref 0–0.2)
BASOPHILS NFR BLD AUTO: 0.3 %
BILIRUB UR QL: NEGATIVE
BUN BLD-MCNC: 13 MG/DL (ref 7–18)
BUN/CREAT SERPL: 14.6 (ref 10–20)
C TRACH DNA SPEC QL NAA+PROBE: NEGATIVE
CALCIUM BLD-MCNC: 9.4 MG/DL (ref 8.5–10.1)
CHLORIDE SERPL-SCNC: 100 MMOL/L (ref 98–112)
CO2 SERPL-SCNC: 29 MMOL/L (ref 21–32)
COLOR UR: YELLOW
CREAT BLD-MCNC: 0.89 MG/DL (ref 0.55–1.02)
DEPRECATED RDW RBC AUTO: 45 FL (ref 35.1–46.3)
EOSINOPHIL # BLD AUTO: 0.15 X10(3) UL (ref 0–0.7)
EOSINOPHIL NFR BLD AUTO: 1.2 %
ERYTHROCYTE [DISTWIDTH] IN BLOOD BY AUTOMATED COUNT: 14.9 % (ref 11–15)
GLUCOSE BLD-MCNC: 103 MG/DL (ref 70–99)
GLUCOSE UR-MCNC: NEGATIVE MG/DL
HCT VFR BLD AUTO: 43 % (ref 35–48)
HGB BLD-MCNC: 14.1 G/DL (ref 12–16)
IMM GRANULOCYTES # BLD AUTO: 0.05 X10(3) UL (ref 0–1)
IMM GRANULOCYTES NFR BLD: 0.4 %
KETONES UR-MCNC: NEGATIVE MG/DL
LYMPHOCYTES # BLD AUTO: 4.01 X10(3) UL (ref 1–4)
LYMPHOCYTES NFR BLD AUTO: 31.1 %
MCH RBC QN AUTO: 27.5 PG (ref 26–34)
MCHC RBC AUTO-ENTMCNC: 32.8 G/DL (ref 31–37)
MCV RBC AUTO: 84 FL (ref 80–100)
MONOCYTES # BLD AUTO: 0.94 X10(3) UL (ref 0.1–1)
MONOCYTES NFR BLD AUTO: 7.3 %
N GONORRHOEA DNA SPEC QL NAA+PROBE: NEGATIVE
NEUTROPHILS # BLD AUTO: 7.7 X10 (3) UL (ref 1.5–7.7)
NEUTROPHILS # BLD AUTO: 7.7 X10(3) UL (ref 1.5–7.7)
NEUTROPHILS NFR BLD AUTO: 59.7 %
NITRITE UR QL STRIP.AUTO: NEGATIVE
OSMOLALITY SERPL CALC.SUM OF ELEC: 282 MOSM/KG (ref 275–295)
PH UR: 6 [PH] (ref 5–8)
PLATELET # BLD AUTO: 397 10(3)UL (ref 150–450)
POTASSIUM SERPL-SCNC: 3.7 MMOL/L (ref 3.5–5.1)
PROT UR-MCNC: 30 MG/DL
RBC # BLD AUTO: 5.12 X10(6)UL (ref 3.8–5.3)
RBC #/AREA URNS AUTO: 68 /HPF
SODIUM SERPL-SCNC: 136 MMOL/L (ref 136–145)
SP GR UR STRIP: 1.02 (ref 1–1.03)
UROBILINOGEN UR STRIP-ACNC: <2
VIT C UR-MCNC: NEGATIVE MG/DL
WBC # BLD AUTO: 12.9 X10(3) UL (ref 4–11)
WBC #/AREA URNS AUTO: 541 /HPF

## 2019-08-16 PROCEDURE — 74177 CT ABD & PELVIS W/CONTRAST: CPT | Performed by: EMERGENCY MEDICINE

## 2019-08-16 PROCEDURE — 87186 SC STD MICRODIL/AGAR DIL: CPT | Performed by: EMERGENCY MEDICINE

## 2019-08-16 PROCEDURE — 87205 SMEAR GRAM STAIN: CPT | Performed by: EMERGENCY MEDICINE

## 2019-08-16 PROCEDURE — 85025 COMPLETE CBC W/AUTO DIFF WBC: CPT | Performed by: EMERGENCY MEDICINE

## 2019-08-16 PROCEDURE — 76856 US EXAM PELVIC COMPLETE: CPT | Performed by: EMERGENCY MEDICINE

## 2019-08-16 PROCEDURE — 93975 VASCULAR STUDY: CPT | Performed by: EMERGENCY MEDICINE

## 2019-08-16 PROCEDURE — 81025 URINE PREGNANCY TEST: CPT

## 2019-08-16 PROCEDURE — 87088 URINE BACTERIA CULTURE: CPT | Performed by: EMERGENCY MEDICINE

## 2019-08-16 PROCEDURE — 87808 TRICHOMONAS ASSAY W/OPTIC: CPT | Performed by: EMERGENCY MEDICINE

## 2019-08-16 PROCEDURE — 96374 THER/PROPH/DIAG INJ IV PUSH: CPT

## 2019-08-16 PROCEDURE — 76830 TRANSVAGINAL US NON-OB: CPT | Performed by: EMERGENCY MEDICINE

## 2019-08-16 PROCEDURE — 87086 URINE CULTURE/COLONY COUNT: CPT | Performed by: EMERGENCY MEDICINE

## 2019-08-16 PROCEDURE — 99285 EMERGENCY DEPT VISIT HI MDM: CPT

## 2019-08-16 PROCEDURE — 80048 BASIC METABOLIC PNL TOTAL CA: CPT | Performed by: EMERGENCY MEDICINE

## 2019-08-16 PROCEDURE — 81001 URINALYSIS AUTO W/SCOPE: CPT | Performed by: EMERGENCY MEDICINE

## 2019-08-16 PROCEDURE — 96372 THER/PROPH/DIAG INJ SC/IM: CPT

## 2019-08-16 PROCEDURE — 87591 N.GONORRHOEAE DNA AMP PROB: CPT | Performed by: EMERGENCY MEDICINE

## 2019-08-16 PROCEDURE — 87491 CHLMYD TRACH DNA AMP PROBE: CPT | Performed by: EMERGENCY MEDICINE

## 2019-08-16 PROCEDURE — 87106 FUNGI IDENTIFICATION YEAST: CPT | Performed by: EMERGENCY MEDICINE

## 2019-08-16 RX ORDER — MORPHINE SULFATE 4 MG/ML
4 INJECTION, SOLUTION INTRAMUSCULAR; INTRAVENOUS ONCE
Status: COMPLETED | OUTPATIENT
Start: 2019-08-16 | End: 2019-08-16

## 2019-08-16 RX ORDER — FLUCONAZOLE 150 MG/1
150 TABLET ORAL ONCE
Qty: 1 TABLET | Refills: 0 | Status: SHIPPED | OUTPATIENT
Start: 2019-08-16 | End: 2019-08-16

## 2019-08-16 RX ORDER — CEPHALEXIN 500 MG/1
500 CAPSULE ORAL 4 TIMES DAILY
Qty: 28 CAPSULE | Refills: 0 | Status: SHIPPED | OUTPATIENT
Start: 2019-08-16 | End: 2019-08-16

## 2019-08-16 RX ORDER — PHENAZOPYRIDINE HYDROCHLORIDE 100 MG/1
100 TABLET, FILM COATED ORAL 2 TIMES DAILY
Qty: 6 TABLET | Refills: 0 | Status: SHIPPED | OUTPATIENT
Start: 2019-08-16 | End: 2019-08-19

## 2019-08-16 RX ORDER — CEPHALEXIN 500 MG/1
500 CAPSULE ORAL 4 TIMES DAILY
Qty: 28 CAPSULE | Refills: 0 | Status: SHIPPED | OUTPATIENT
Start: 2019-08-16 | End: 2019-08-23

## 2019-08-16 RX ORDER — HYDROCODONE BITARTRATE AND ACETAMINOPHEN 5; 325 MG/1; MG/1
1 TABLET ORAL EVERY 6 HOURS PRN
Qty: 10 TABLET | Refills: 0 | Status: SHIPPED | OUTPATIENT
Start: 2019-08-16 | End: 2019-11-25 | Stop reason: ALTCHOICE

## 2019-08-16 RX ORDER — DOXYCYCLINE HYCLATE 100 MG/1
100 CAPSULE ORAL 2 TIMES DAILY
Qty: 20 CAPSULE | Refills: 0 | Status: SHIPPED | OUTPATIENT
Start: 2019-08-16 | End: 2019-08-26

## 2019-08-16 NOTE — ED PROVIDER NOTES
Patient Seen in: [facility] AND [facility] Emergency Department    History   Patient presents with:  Pelvic Pain    Stated Complaint: pelvic pain x weeks    HPI    77-year-old female with history of vaginal delivery of a child without abdominal surgeries 2 to 3 intact distal pulses. Pulmonary/Chest: Effort normal. No respiratory distress. Abdominal: Soft. Letter tenderness to palpation in the left mid and lower abdomen. No guarding or peritoneal signs. Genitourinary: Exam chaperoned by Lucy Landa.   Extern orders. CHLAMYDIA/GONOCOCCUS, JUVE   GENITAL VAGINOSIS SCREEN   URINE CULTURE, ROUTINE          ULTRASOUND PELVIS      IMPRESSION:    There is a slightly complex right ovarian dominant follicle measuring 2.7 cm. The left ovary is unremarkable.   Vascular for discharge home. In addition her pelvic examination as documented above showed evidence of possible cervicitis and even PID. There is no TOA on the pelvic ultrasound.   The patient was given a dose of Rocephin here but will be started on empiric therap INFECTION SYMPTOMS. Qty: 1 tablet Refills: 0    cephALEXin (KEFLEX) 500 MG Oral Cap  Take 1 capsule (500 mg total) by mouth 4 (four) times daily for 7 days.   Qty: 28 capsule Refills: 0

## 2019-08-16 NOTE — TELEPHONE ENCOUNTER
See me to fill out this form. It has many questions that I just cannot answer without actually examining the patient.

## 2019-08-18 LAB
GENITAL VAGINOSIS SCREEN: NEGATIVE
TRICHOMONAS SCREEN: NEGATIVE

## 2019-08-22 ENCOUNTER — OFFICE VISIT (OUTPATIENT)
Dept: SURGERY | Facility: CLINIC | Age: 39
End: 2019-08-22
Payer: COMMERCIAL

## 2019-08-22 DIAGNOSIS — G56.03 BILATERAL CARPAL TUNNEL SYNDROME: Primary | ICD-10-CM

## 2019-08-22 PROCEDURE — 99243 OFF/OP CNSLTJ NEW/EST LOW 30: CPT | Performed by: PLASTIC SURGERY

## 2019-08-22 RX ORDER — FLUCONAZOLE 150 MG/1
TABLET ORAL
Refills: 0 | COMMUNITY
Start: 2019-08-17 | End: 2019-09-13

## 2019-08-22 RX ORDER — HYDROCODONE BITARTRATE AND ACETAMINOPHEN 5; 325 MG/1; MG/1
TABLET ORAL
Qty: 5 TABLET | Refills: 0 | Status: SHIPPED | OUTPATIENT
Start: 2019-08-22 | End: 2019-11-25 | Stop reason: ALTCHOICE

## 2019-08-22 NOTE — H&P
Amber Ackerman is a 37year old female that presents with Patient presents with:  Carpal Tunnel Syndrome: KLAUS CTS  .     REFERRED BY:  Shara Meyers     Pacemaker: No  Latex Allergy: no  Coumadin: No  Plavix: No  Other anticoagulants: No  Cardiac sten \"work\"    Pain: \"sharp and stabbing\" pain to KLAUS hand, especially at noc     Night symptoms: Yes    Functional problems: Dropping things, hand \"cracks\" a lot, gripping difficulty    Previous therapy: KLAUS hand brace, wears at noc, somewhat effective strain: Not on file      Food insecurity:        Worry: Not on file        Inability: Not on file      Transportation needs:        Medical: Not on file        Non-medical: Not on file    Tobacco Use      Smoking status: Never Smoker      Smokeless tobacco Regular use of sun block: Not Asked    Social History Narrative      Not on file    Family History   Problem Relation Age of Onset   • Other (Other) Mother         cholesterol   • Cancer Brother         Leukemia   • Asthma Other         Children   • Other Form.  Questions were answered and the patient wishes to proceed with treatment.     Some symptoms may persist   Some symptoms may not improve   Some symptoms may worsen   Further surgery may be necessary    Even with surgery that decompresses the nerve, ne

## 2019-09-11 RX ORDER — LIDOCAINE 50 MG/G
PATCH TOPICAL
Qty: 30 PATCH | Refills: 0 | OUTPATIENT
Start: 2019-09-11

## 2019-09-12 ENCOUNTER — TELEPHONE (OUTPATIENT)
Dept: SURGERY | Facility: CLINIC | Age: 39
End: 2019-09-12

## 2019-09-12 DIAGNOSIS — G56.01 CARPAL TUNNEL SYNDROME, RIGHT: Primary | ICD-10-CM

## 2019-09-12 NOTE — TELEPHONE ENCOUNTER
YUSEF with [employer] adjustor for workman's compensation to obtain authorization for surgery scheduled 9/17/19, claim number [id number].

## 2019-09-13 ENCOUNTER — OFFICE VISIT (OUTPATIENT)
Dept: FAMILY MEDICINE CLINIC | Facility: CLINIC | Age: 39
End: 2019-09-13
Payer: OTHER MISCELLANEOUS

## 2019-09-13 VITALS — BODY MASS INDEX: 27.49 KG/M2 | HEIGHT: 64 IN | WEIGHT: 161 LBS

## 2019-09-13 DIAGNOSIS — G56.03 BILATERAL CARPAL TUNNEL SYNDROME: Primary | ICD-10-CM

## 2019-09-13 DIAGNOSIS — M77.11 LATERAL EPICONDYLITIS OF BOTH ELBOWS: ICD-10-CM

## 2019-09-13 DIAGNOSIS — M77.12 LATERAL EPICONDYLITIS OF BOTH ELBOWS: ICD-10-CM

## 2019-09-13 PROCEDURE — 99213 OFFICE O/P EST LOW 20 MIN: CPT | Performed by: NURSE PRACTITIONER

## 2019-09-13 PROCEDURE — 99212 OFFICE O/P EST SF 10 MIN: CPT | Performed by: NURSE PRACTITIONER

## 2019-09-13 NOTE — PROGRESS NOTES
HPI  Pt here for increasing pain to Right elbow for the past 2 weeks. Having increase in left shoulder pain-having a hard time lifting up left arm. Right wrist surgery is scheduled on 9/17/19 by Dr Sneha Esquivel.      Has bilat wrist splints in place and strain: Not on file      Food insecurity:        Worry: Not on file        Inability: Not on file      Transportation needs:        Medical: Not on file        Non-medical: Not on file    Tobacco Use      Smoking status: Never Smoker      Smokeless tobacco Regular use of sun block: Not Asked    Social History Narrative      Not on file        Current Outpatient Medications:  HYDROcodone-acetaminophen 5-325 MG Oral Tab Take 0.5-1 tablets by mouth every 4 to 6 hours as needed for Pain.  Disp: 5 tablet Rfl: with questions or concerns. Encouraged to sign up for My Chart if not already registered.

## 2019-09-16 NOTE — TELEPHONE ENCOUNTER
Left voice message to return callback regarding surgery scheduled 9/17/19, awaiting approval from workman's compensation representative [employer].

## 2019-09-16 NOTE — TELEPHONE ENCOUNTER
Attempted to contact adjustor Lalita Munoz, left voice message to return call to the office regarding authorization for surgery.    Was transferred to  Jie Falcon at [place] whom relayed there has not been approval for surgery at this time an

## 2019-09-16 NOTE — TELEPHONE ENCOUNTER
Surgery has been cancelled, workmans compensation has not approved surgery and is pending review, unable to make pt aware voicemail box is full.

## 2019-09-16 NOTE — TELEPHONE ENCOUNTER
Spoke with patient that is scheduled for surgery on 9/17/19. Pt does not have update on authorization for surgery scheduled from workers compensation.  Suggested to call employer or workers compensation representative for update if surgery will be approved

## 2019-09-30 NOTE — TELEPHONE ENCOUNTER
Left voice message to return callback to our office regarding pending authorization for surgery for patient claim number [id number]. Office number and hours provided.

## 2019-10-09 ENCOUNTER — TELEPHONE (OUTPATIENT)
Dept: NEUROLOGY | Facility: CLINIC | Age: 39
End: 2019-10-09

## 2019-10-18 ENCOUNTER — OFFICE VISIT (OUTPATIENT)
Dept: FAMILY MEDICINE CLINIC | Facility: CLINIC | Age: 39
End: 2019-10-18
Payer: COMMERCIAL

## 2019-10-18 VITALS
SYSTOLIC BLOOD PRESSURE: 116 MMHG | HEIGHT: 64 IN | BODY MASS INDEX: 26 KG/M2 | DIASTOLIC BLOOD PRESSURE: 79 MMHG | HEART RATE: 72 BPM

## 2019-10-18 DIAGNOSIS — N92.6 IRREGULAR MENSES: ICD-10-CM

## 2019-10-18 DIAGNOSIS — G56.03 BILATERAL CARPAL TUNNEL SYNDROME: Primary | ICD-10-CM

## 2019-10-18 DIAGNOSIS — M54.42 ACUTE LEFT-SIDED LOW BACK PAIN WITH LEFT-SIDED SCIATICA: ICD-10-CM

## 2019-10-18 PROCEDURE — 99214 OFFICE O/P EST MOD 30 MIN: CPT | Performed by: NURSE PRACTITIONER

## 2019-10-18 PROCEDURE — 81025 URINE PREGNANCY TEST: CPT | Performed by: NURSE PRACTITIONER

## 2019-10-18 RX ORDER — CYCLOBENZAPRINE HCL 5 MG
5 TABLET ORAL 3 TIMES DAILY PRN
Qty: 30 TABLET | Refills: 0 | Status: SHIPPED | OUTPATIENT
Start: 2019-10-18 | End: 2020-03-16

## 2019-10-18 RX ORDER — PREDNISONE 20 MG/1
TABLET ORAL
Qty: 10 TABLET | Refills: 0 | Status: SHIPPED | OUTPATIENT
Start: 2019-10-18 | End: 2019-11-25 | Stop reason: ALTCHOICE

## 2019-10-18 NOTE — PROGRESS NOTES
HPI  Pt here for carpal tunnel f/u-was supposed to have surgery in Sept but was cancelled due to insurance authorization not being rec'd on time. Was trying to bill workman's comp but pt is considering using her private insurance.  WEars bilat wrist braces Problem Relation Age of Onset   • Lipids Mother    • Other (Other) Mother         cholesterol   • Cancer Brother         Leukemia   • Asthma Other         Children   • Other (Other) Maternal Grandmother         Coronary artery disease, (cause of death at Stress Concern: Not Asked        Weight Concern: Not Asked        Special Diet: Not Asked        Back Care: Not Asked        Exercise: Not Asked        Bike Helmet: Not Asked        Seat Belt: Not Asked        Self-Exams: Not Asked        Left Handed: No exhibits no discharge. Cardiovascular: Normal rate, regular rhythm and normal heart sounds. No murmur heard. Edema not present. Pulmonary/Chest: Effort normal and breath sounds normal. No respiratory distress. She has no wheezes. She has no rales.

## 2019-10-18 NOTE — PATIENT INSTRUCTIONS
Sciatica    Sciatica is a condition that causes pain in the lower back that spreads down into the buttock, hip, and leg. Sometimes the leg pain can happen without any back pain.  Sciatica happens when a spinal nerve is irritated or has pressure put on it · When in bed, try to find a position that is comfortable. A firm mattress is best. Try lying flat on your back with pillows under your knees. You can also try lying on your side with your knees bent up toward your chest and a pillow between your knees.   · © 6550-5609 [employer] [address] [facility], [facility] [employer]. All rights reserved. This information is not intended as a substitute for professional medical care. Always follow your healthcare professional's instructions.       LOW BACK Disc bulge. or herniation, can cause pressure on a nerve, which can radiate pain down the leg. This generally  responds well to a strengthening and stretching program and rarely requires surgery.   Other causes of low back pain include bladder/kidney infect unexplained weight loss, redness or swelling on the back or spine, pain /numbness /tingling that travels down the  leg(s) below the knee, leg weakness, bowel or bladder problems, or back pain due to a severe blow or fall.    If your symptoms do not resolve

## 2019-11-01 ENCOUNTER — TELEPHONE (OUTPATIENT)
Dept: SURGERY | Facility: CLINIC | Age: 39
End: 2019-11-01

## 2019-11-01 NOTE — TELEPHONE ENCOUNTER
Medical record request and Disab form logged for processing. HIPAA on file from July/2019. Milan adhikari.

## 2019-11-08 NOTE — TELEPHONE ENCOUNTER
Dr. Regla Martínez forms pending. Pt is still currently off work due to bilateral wrist pain and possible Workman's comp case. Do you still continue to support pt's disabilty or would you just like me to send your last few office notes?  Please advise

## 2019-11-14 NOTE — TELEPHONE ENCOUNTER
Mark Ruff from [employer] calling to fu on request for pt's disability form? Please fax to [phone]. [employer] Ref# [id number]

## 2019-11-14 NOTE — TELEPHONE ENCOUNTER
Dr. Polo Florez just wanted notes sent to [facility] [address]  and not do the form. Notes have been sent on 11/12/19. Request complete.

## 2019-11-20 NOTE — TELEPHONE ENCOUNTER
[employer] called, states didn't receive notes faxed out on 11/12/19.  Confirmed fax number, re faxing notes with acct # [de-identified] with confirmation receipt from 11/12/19

## 2019-11-22 ENCOUNTER — TELEPHONE (OUTPATIENT)
Dept: NEUROLOGY | Facility: CLINIC | Age: 39
End: 2019-11-22

## 2019-11-22 ENCOUNTER — OFFICE VISIT (OUTPATIENT)
Dept: NEUROLOGY | Facility: CLINIC | Age: 39
End: 2019-11-22
Payer: COMMERCIAL

## 2019-11-22 VITALS — BODY MASS INDEX: 25.61 KG/M2 | WEIGHT: 150 LBS | RESPIRATION RATE: 17 BRPM | HEIGHT: 64 IN

## 2019-11-22 DIAGNOSIS — M77.02 MEDIAL EPICONDYLITIS OF ELBOW, LEFT: ICD-10-CM

## 2019-11-22 DIAGNOSIS — G56.03 BILATERAL CARPAL TUNNEL SYNDROME: ICD-10-CM

## 2019-11-22 DIAGNOSIS — M77.11 RIGHT LATERAL EPICONDYLITIS: ICD-10-CM

## 2019-11-22 DIAGNOSIS — M67.921 TENDINOPATHY OF RIGHT ELBOW: ICD-10-CM

## 2019-11-22 DIAGNOSIS — R20.2 NUMBNESS AND TINGLING IN LEFT HAND: Primary | ICD-10-CM

## 2019-11-22 DIAGNOSIS — M25.522 LEFT ELBOW PAIN: ICD-10-CM

## 2019-11-22 DIAGNOSIS — M25.521 RIGHT ELBOW PAIN: ICD-10-CM

## 2019-11-22 DIAGNOSIS — R20.0 NUMBNESS AND TINGLING IN LEFT HAND: Primary | ICD-10-CM

## 2019-11-22 PROCEDURE — 99214 OFFICE O/P EST MOD 30 MIN: CPT | Performed by: PHYSICAL MEDICINE & REHABILITATION

## 2019-11-22 NOTE — PROGRESS NOTES
[address]  Progress Note    CHIEF COMPLAINT:  Patient presents with:  Elbow Pain: LOV 07/18/19 Pt is present with elbow pain       History of Present Illness:   The patient is a 37year old RIGHT handed fema file    Tobacco Use      Smoking status: Never Smoker      Smokeless tobacco: Never Used    Substance and Sexual Activity      Alcohol use: Not Currently        Alcohol/week: 0.0 standard drinks      Drug use: No      Sexual activity: Not on file      FAMI other systems reviewed and are negative. Pertinent positives and negatives noted in the HPI. PHYSICAL EXAM:   Resp 17   Ht 64\"   Wt 150 lb (68 kg)   BMI 25.75 kg/m²     Body mass index is 25.75 kg/m².       General: No immediate distress  Head: Norm Color 08/16/2019 Yellow  Yellow Final   • Clarity Urine 08/16/2019 Cloudy* Clear Final   • Spec Gravity 08/16/2019 1.016  1.002 - 1.035 Final   • pH Urine 08/16/2019 6.0  5.0 - 8.0 Final   • Protein Urine 08/16/2019 30 * Negative mg/dL Final   • Glucose Ur Negative For Candida  Negative for Candida Final   • WBC 08/16/2019 12.9* 4.0 - 11.0 x10(3) uL Final   • RBC 08/16/2019 5.12  3.80 - 5.30 x10(6)uL Final   • HGB 08/16/2019 14.1  12.0 - 16.0 g/dL Final   • HCT 08/16/2019 43.0  35.0 - 48.0 % Final   • MCV 08 radiocapitellar and anterior humeral lines are intact. SOFT TISSUES:            Negative for visible soft tissue swelling or radiopaque foreign body.     EFFUSION:       There is no anterior or posterior fat pad elevation to suggest underlying hemarthrosis

## 2019-11-22 NOTE — PATIENT INSTRUCTIONS
1) Schedule the PRP for the RIGHT lateral epicondyle on your way out today. I will have my  you about a payment plan.     2) Schedule the LEFT upper extremity EMG study to evaluate for an ulnar neuropathy    3) Follow up with Dr. Lucius Opitz

## 2019-11-22 NOTE — PROGRESS NOTES
Location of pain: elbow   Rate your pain: 7     Characterize the pain: Sharp  Worse with: stretching it out  Better with: not using it   Medications used: OTC and flexeril   Previous Injections: yes  Previous Imaging: yes  Previous Therapies for this condi

## 2019-11-25 ENCOUNTER — OFFICE VISIT (OUTPATIENT)
Dept: SURGERY | Facility: CLINIC | Age: 39
End: 2019-11-25
Payer: COMMERCIAL

## 2019-11-25 DIAGNOSIS — G56.03 BILATERAL CARPAL TUNNEL SYNDROME: Primary | ICD-10-CM

## 2019-11-25 PROCEDURE — 99214 OFFICE O/P EST MOD 30 MIN: CPT | Performed by: PLASTIC SURGERY

## 2019-11-25 RX ORDER — HYDROCODONE BITARTRATE AND ACETAMINOPHEN 7.5; 325 MG/1; MG/1
1 TABLET ORAL
Qty: 10 TABLET | Refills: 0 | Status: SHIPPED | OUTPATIENT
Start: 2019-11-25 | End: 2020-01-20 | Stop reason: ALTCHOICE

## 2019-11-25 NOTE — PROGRESS NOTES
Pt request for surgery signed by pt and witnessed and signed by RN. Prescription for Norco  and narcotic prescription electronically sent to pharmacy per Dr. Mayes Double order and pt instructed to  prescription before surgery.    Pre-Surgical Instr

## 2019-11-25 NOTE — H&P
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      RECONSTRUCTIVE HISTORY    SUN EXPOSURE  Current no  Past no  Sunburns no  Tanning moi tablet (5 mg total) by mouth 3 (three) times daily as needed for Muscle spasms. 30 tablet 0   • Cholecalciferol (VITAMIN D3) 1000 units Oral Cap TK ONE C PO D  0   • lidocaine 5 % External Patch Place 1 patch onto the skin daily.  30 patch 1   • acetaminoph Intimate partner violence:        Fear of current or ex partner: Not on file        Emotionally abused: Not on file        Physically abused: Not on file        Forced sexual activity: Not on file    Other Topics      Concerns:         Service: Not time, and situation, Appropriate mood and affect    Right hand full painless range of motion with excellent wrist hyperextension  Thenar intrinsics intact symmetric  Ulnar intrinsics intact symmetric  Sensation grossly intact    Tinel median wrist positive function. Even with satisfactory healing, the hand/digit may not regain normal ROM or normal function.     PLAN    RECTR     This will not help her right lateral epicondylitis pain    She has left hand symptoms, possible ulnar nerve, managed by Dr. Byron Aguilar

## 2019-11-25 NOTE — PROGRESS NOTES
Pt here to schedule surgery for CTS RH  Pt was here 8/22/19, authorization was never approved from workers compensation, proceeding with insurance.   Pt remains with symptoms to bilataeral hands  RTH, RIF, RMF numbness and tingling  Has weakness to RH, drop

## 2019-11-26 ENCOUNTER — TELEPHONE (OUTPATIENT)
Dept: SURGERY | Facility: CLINIC | Age: 39
End: 2019-11-26

## 2019-11-26 DIAGNOSIS — G56.01 CARPAL TUNNEL SYNDROME, RIGHT: Primary | ICD-10-CM

## 2019-12-10 ENCOUNTER — TELEPHONE (OUTPATIENT)
Dept: NEUROLOGY | Facility: CLINIC | Age: 39
End: 2019-12-10

## 2019-12-10 NOTE — TELEPHONE ENCOUNTER
Dr. Jim Gallardo  This patient came in today to give a deposit for her PRP injection. According to this patient she stated after her EMG on Friday she would be squeezed in Friday 12/13/19.  Can you please let me know if she will be added to the schedule for a PRP

## 2019-12-11 NOTE — TELEPHONE ENCOUNTER
PSR called Pt to inform that we are unable to do PRP for 12/13/2019. T/t Dr Patrick Hernandez who offered Monday morning depending on injection schedule. Pt was made aware that nursing staff would call back to confirm by end of week. PRP Injection   Pre-Paid $ 620.

## 2019-12-13 ENCOUNTER — HOSPITAL ENCOUNTER (OUTPATIENT)
Dept: GENERAL RADIOLOGY | Facility: HOSPITAL | Age: 39
Discharge: HOME | End: 2019-12-13
Attending: PHYSICAL MEDICINE & REHABILITATION
Payer: COMMERCIAL

## 2019-12-13 ENCOUNTER — PROCEDURE VISIT (OUTPATIENT)
Dept: NEUROLOGY | Facility: CLINIC | Age: 39
End: 2019-12-13
Payer: COMMERCIAL

## 2019-12-13 DIAGNOSIS — M22.2X9 PATELLOFEMORAL PAIN SYNDROME, UNSPECIFIED LATERALITY: ICD-10-CM

## 2019-12-13 DIAGNOSIS — G89.29 CHRONIC PAIN OF LEFT KNEE: ICD-10-CM

## 2019-12-13 DIAGNOSIS — M25.562 CHRONIC PAIN OF LEFT KNEE: ICD-10-CM

## 2019-12-13 DIAGNOSIS — M22.2X9 PATELLOFEMORAL PAIN SYNDROME, UNSPECIFIED LATERALITY: Primary | ICD-10-CM

## 2019-12-13 DIAGNOSIS — M25.522 LEFT ELBOW PAIN: ICD-10-CM

## 2019-12-13 DIAGNOSIS — R20.2 NUMBNESS AND TINGLING IN LEFT HAND: ICD-10-CM

## 2019-12-13 DIAGNOSIS — R20.0 NUMBNESS AND TINGLING IN LEFT HAND: ICD-10-CM

## 2019-12-13 PROCEDURE — 95910 NRV CNDJ TEST 7-8 STUDIES: CPT | Performed by: PHYSICAL MEDICINE & REHABILITATION

## 2019-12-13 PROCEDURE — 99213 OFFICE O/P EST LOW 20 MIN: CPT | Performed by: PHYSICAL MEDICINE & REHABILITATION

## 2019-12-13 PROCEDURE — 95886 MUSC TEST DONE W/N TEST COMP: CPT | Performed by: PHYSICAL MEDICINE & REHABILITATION

## 2019-12-13 PROCEDURE — 73564 X-RAY EXAM KNEE 4 OR MORE: CPT | Performed by: PHYSICAL MEDICINE & REHABILITATION

## 2019-12-13 NOTE — PROGRESS NOTES
[address]  Progress Note    CHIEF COMPLAINT:  Patient presents with:  Emg/ncv: LUE EMG - Pt c/o burning/numbness/tingling in the LUE from shoulder to hand since beginning of the year.  Denies PMH: Thyroid is Grandmother         Coronary artery disease, (cause of death at age 46)   • Diabetes Father        CURRENT MEDICATIONS:   Current Outpatient Medications   Medication Sig Dispense Refill   • HYDROcodone-acetaminophen 7.5-325 MG Oral Tab Take 1 tablet by kobe or obvious deformity  Palpation: no ttp over medial joint line, lateral joint line, pes anserine bursa, tibial tubercle, gerdy's tubercle, medial or lateral hamstring attachment, posterior calf, or popliteal fossa.   Tender to palpation over the lateral pat /HPF Final   • RBC URINE 08/16/2019 68* 0 - 2 /HPF Final   • Bacteria Urine 08/16/2019 Few* None Seen /HPF Final   • Glucose 08/16/2019 103* 70 - 99 mg/dL Final   • Sodium 08/16/2019 136  136 - 145 mmol/L Final   • Potassium 08/16/2019 3.7  3.5 - 5.1 mmol/ 1.00 x10(3) uL Final   • Eosinophil Absolute 08/16/2019 0.15  0.00 - 0.70 x10(3) uL Final   • Basophil Absolute 08/16/2019 0.04  0.00 - 0.20 x10(3) uL Final   • Immature Granulocyte Absolute 08/16/2019 0.05  0.00 - 1.00 x10(3) uL Final   • Neutrophil % 08/

## 2019-12-13 NOTE — PROCEDURES
[address]   Nerve Conduction Study and Electromyography Procedure Note              Patient: Camp  Dominance:  Right   Patient ID: [id number] Referring Dr:  Dr. Johanny Griffiths   Sex: Female Test  44.9 Forearm - Hand dorsum 10 66       EMG Summary Table     Spontaneous MUAP Recruitment   Muscle Nerve Roots IA Fib PSW Fasc H.F. Comments Amp Dur. PPP Pattern   L. Deltoid Axillary C5-C6 N None None None None Normal N N N N   L.  Biceps brachii Musculocu Thank you for allowing us to participate in the care of your patient.       Electronic signature of the attending physician affirms that he/she personally participated in the clinical assessment of this patient, performed or reviewed all electrodiagno

## 2019-12-16 NOTE — TELEPHONE ENCOUNTER
PSR called Pt to cancel appt due to Dr being out. Pt is having her surgery on 12/20/2019. Pt would like to be added on before that. Please advise.

## 2019-12-19 ENCOUNTER — OFFICE VISIT (OUTPATIENT)
Dept: NEUROLOGY | Facility: CLINIC | Age: 39
End: 2019-12-19

## 2019-12-19 ENCOUNTER — MED REC SCAN ONLY (OUTPATIENT)
Dept: NEUROLOGY | Facility: CLINIC | Age: 39
End: 2019-12-19

## 2019-12-19 VITALS
BODY MASS INDEX: 26.63 KG/M2 | WEIGHT: 156 LBS | HEIGHT: 64 IN | SYSTOLIC BLOOD PRESSURE: 122 MMHG | DIASTOLIC BLOOD PRESSURE: 78 MMHG | RESPIRATION RATE: 18 BRPM | HEART RATE: 70 BPM

## 2019-12-19 DIAGNOSIS — M77.11 RIGHT LATERAL EPICONDYLITIS: Primary | ICD-10-CM

## 2019-12-19 DIAGNOSIS — M67.921 TENDINOPATHY OF RIGHT ELBOW: ICD-10-CM

## 2019-12-19 DIAGNOSIS — M25.521 RIGHT ELBOW PAIN: ICD-10-CM

## 2019-12-19 PROCEDURE — 0232T NJX PLATELET PLASMA: CPT | Performed by: PHYSICAL MEDICINE & REHABILITATION

## 2019-12-19 RX ORDER — LIDOCAINE HYDROCHLORIDE 10 MG/ML
3 INJECTION, SOLUTION INFILTRATION; PERINEURAL ONCE
Status: COMPLETED | OUTPATIENT
Start: 2019-12-19 | End: 2019-12-19

## 2019-12-19 RX ORDER — TRAMADOL HYDROCHLORIDE 50 MG/1
50 TABLET ORAL EVERY 6 HOURS PRN
Qty: 90 TABLET | Refills: 0 | Status: SHIPPED | OUTPATIENT
Start: 2019-12-19 | End: 2020-03-16

## 2019-12-19 RX ORDER — TRAMADOL HYDROCHLORIDE 50 MG/1
50 TABLET ORAL EVERY 6 HOURS PRN
Qty: 90 TABLET | Refills: 0 | OUTPATIENT
Start: 2019-12-19 | End: 2019-12-19

## 2019-12-19 RX ADMIN — LIDOCAINE HYDROCHLORIDE 3 ML: 10 INJECTION, SOLUTION INFILTRATION; PERINEURAL at 16:26:00

## 2019-12-19 NOTE — PROCEDURES
[address]   Lateral Epicondyle/Common Extensor Tendon Injection Procedure Note    CHIEF COMPLAINT:  Patient presents with:   Injection: LOV 11/22/19 PRP       PROCEDURE PERFORMED:  RIGHT lateral epicondyle/c 70 - 99 mg/dL Final   • Sodium 08/16/2019 136  136 - 145 mmol/L Final   • Potassium 08/16/2019 3.7  3.5 - 5.1 mmol/L Final   • Chloride 08/16/2019 100  98 - 112 mmol/L Final   • CO2 08/16/2019 29.0  21.0 - 32.0 mmol/L Final   • Anion Gap 08/16/2019 7  0 - x10(3) uL Final   • Immature Granulocyte Absolute 08/16/2019 0.05  0.00 - 1.00 x10(3) uL Final   • Neutrophil % 08/16/2019 59.7  % Final   • Lymphocyte % 08/16/2019 31.1  % Final   • Monocyte % 08/16/2019 7.3  % Final   • Eosinophil % 08/16/2019 1.2  % Fin decreased range of motion, increased redness, chills, or anything that makes you concerned about how the joint we injected feels/looks.   If you do not reach me in a reasonable time, please report directly to the emergency room for further evaluation\"

## 2019-12-20 NOTE — TELEPHONE ENCOUNTER
In place og APS, recent OV 11/25/19 faxed to [facility] [phone], [facility]

## 2020-01-15 ENCOUNTER — TELEPHONE (OUTPATIENT)
Dept: SURGERY | Facility: CLINIC | Age: 40
End: 2020-01-15

## 2020-01-15 ENCOUNTER — OFFICE VISIT (OUTPATIENT)
Dept: FAMILY MEDICINE CLINIC | Facility: CLINIC | Age: 40
End: 2020-01-15
Payer: COMMERCIAL

## 2020-01-15 VITALS
HEART RATE: 73 BPM | BODY MASS INDEX: 28.34 KG/M2 | SYSTOLIC BLOOD PRESSURE: 124 MMHG | WEIGHT: 166 LBS | DIASTOLIC BLOOD PRESSURE: 82 MMHG | HEIGHT: 64 IN

## 2020-01-15 DIAGNOSIS — Z01.818 PREOP EXAMINATION: ICD-10-CM

## 2020-01-15 DIAGNOSIS — G56.03 BILATERAL CARPAL TUNNEL SYNDROME: Primary | ICD-10-CM

## 2020-01-15 PROCEDURE — 99214 OFFICE O/P EST MOD 30 MIN: CPT | Performed by: NURSE PRACTITIONER

## 2020-01-15 NOTE — ASSESSMENT & PLAN NOTE
Has right carpal tunnel release surgery scheduled for 1/24/2020 and OT to follow  Will have left carpal tunnel surgery likely 6 weeks after 1st surgery

## 2020-01-15 NOTE — PROGRESS NOTES
HPI    Pt here for f/u bilat carpal tunnel syndrome. Had PRP injection in right elbow-seems to have helped a little. Has a hard time sleeping at night. Is having right carpal tunnel surgery on 1/24/2020. Dropped off medical leave papers.   Is still Problem Relation Age of Onset   • Lipids Mother    • Other (Other) Mother         cholesterol   • Cancer Brother         Leukemia   • Asthma Other         Children   • Other (Other) Maternal Grandmother         Coronary artery disease, (cause of death at Stress Concern: Not Asked        Weight Concern: Not Asked        Special Diet: Not Asked        Back Care: Not Asked        Exercise: Not Asked        Bike Helmet: Not Asked        Seat Belt: Not Asked        Self-Exams: Not Asked        Left Handed: No discharge. Neck: Normal range of motion. Neck supple. No thyromegaly present. Cardiovascular: Normal rate, regular rhythm and normal heart sounds. No murmur heard. Edema not present.   Pulmonary/Chest: Effort normal and breath sounds normal. No res

## 2020-01-16 ENCOUNTER — TELEPHONE (OUTPATIENT)
Dept: SURGERY | Facility: CLINIC | Age: 40
End: 2020-01-16

## 2020-01-21 NOTE — TELEPHONE ENCOUNTER
LVM for pt to please call the office to update H&P for surgery 1/24/20, stressed importance we speak with her before surgery. Fourth attempt.

## 2020-01-23 ENCOUNTER — TELEPHONE (OUTPATIENT)
Dept: SURGERY | Facility: CLINIC | Age: 40
End: 2020-01-23

## 2020-01-23 NOTE — H&P
11/25/2019 Right lateral epicondylitis treated by Dr. Alison Avitia left hand for L ECTR   ?  Left ulnar nerve symptoms addressed by Dr. Marlena Ruby      Pacemaker: No  Latex Allergy: no  Coumadin: No  Plavix: No  Other anticoagulants: No  Cardiac stents: Vitamins-Minerals (MULTIVITAMIN ADULT OR) Take by mouth. • Cyclobenzaprine HCl 5 MG Oral Tab Take 1 tablet (5 mg total) by mouth 3 (three) times daily as needed for Muscle spasms.  30 tablet 0   • Cholecalciferol (VITAMIN D3) 1000 units Oral Cap TK ONE connections:        Talks on phone: Not on file        Gets together: Not on file        Attends Judaism service: Not on file        Active member of club or organization: Not on file        Attends meetings of clubs or organizations: Not on file Normal  NECK/THYROID: Inspection - Normal, Palpation - Normal, Thyroid gland - Normal, No adenopathy  RESPIRATORY: Inspection - Normal, Effort - Normal  CARDIOVASCULAR: Regular rhythm, No murmurs  ABDOMEN: Inspection - Normal, No abdominal tenderness  NEUR Consistent elevation of the hand above heart level is critical.  Therapy will be necessary post-operatively.   Failure to comply with post-operative instructions, including therapy, will have significant impact on the final result, and could lead to perman

## 2020-01-23 NOTE — TELEPHONE ENCOUNTER
Spoke with patient. All changes to medications and allergies, per patient report, have been documented in the medical record.      Patient  has not been ill, hospitalized, or had any surgical procedures since last seen in our office on 11/25/19

## 2020-01-24 ENCOUNTER — HOSPITAL DOCUMENTATION (OUTPATIENT)
Dept: SURGERY | Facility: CLINIC | Age: 40
End: 2020-01-24

## 2020-01-24 ENCOUNTER — ANESTHESIA (OUTPATIENT)
Dept: SURGERY | Facility: HOSPITAL | Age: 40
End: 2020-01-24
Payer: COMMERCIAL

## 2020-01-24 ENCOUNTER — ANESTHESIA EVENT (OUTPATIENT)
Dept: SURGERY | Facility: HOSPITAL | Age: 40
End: 2020-01-24
Payer: COMMERCIAL

## 2020-01-24 ENCOUNTER — HOSPITAL ENCOUNTER (OUTPATIENT)
Facility: HOSPITAL | Age: 40
Setting detail: HOSPITAL OUTPATIENT SURGERY
Discharge: HOME | End: 2020-01-24
Attending: PLASTIC SURGERY | Admitting: PLASTIC SURGERY
Payer: COMMERCIAL

## 2020-01-24 VITALS
OXYGEN SATURATION: 98 % | WEIGHT: 160 LBS | DIASTOLIC BLOOD PRESSURE: 74 MMHG | SYSTOLIC BLOOD PRESSURE: 114 MMHG | TEMPERATURE: 98 F | RESPIRATION RATE: 12 BRPM | HEART RATE: 71 BPM | HEIGHT: 64 IN | BODY MASS INDEX: 27.31 KG/M2

## 2020-01-24 DIAGNOSIS — G56.01 CARPAL TUNNEL SYNDROME, RIGHT: Primary | ICD-10-CM

## 2020-01-24 LAB — B-HCG UR QL: NEGATIVE

## 2020-01-24 PROCEDURE — 29848 WRIST ENDOSCOPY/SURGERY: CPT | Performed by: PLASTIC SURGERY

## 2020-01-24 PROCEDURE — 01N54ZZ RELEASE MEDIAN NERVE, PERCUTANEOUS ENDOSCOPIC APPROACH: ICD-10-PCS | Performed by: PLASTIC SURGERY

## 2020-01-24 RX ORDER — HYDROCODONE BITARTRATE AND ACETAMINOPHEN 5; 325 MG/1; MG/1
2 TABLET ORAL AS NEEDED
Status: COMPLETED | OUTPATIENT
Start: 2020-01-24 | End: 2020-01-24

## 2020-01-24 RX ORDER — MORPHINE SULFATE 10 MG/ML
6 INJECTION, SOLUTION INTRAMUSCULAR; INTRAVENOUS EVERY 10 MIN PRN
Status: DISCONTINUED | OUTPATIENT
Start: 2020-01-24 | End: 2020-01-24

## 2020-01-24 RX ORDER — ONDANSETRON 2 MG/ML
INJECTION INTRAMUSCULAR; INTRAVENOUS AS NEEDED
Status: DISCONTINUED | OUTPATIENT
Start: 2020-01-24 | End: 2020-01-24 | Stop reason: SURG

## 2020-01-24 RX ORDER — HYDROCODONE BITARTRATE AND ACETAMINOPHEN 7.5; 325 MG/1; MG/1
1 TABLET ORAL EVERY 4 HOURS PRN
Status: DISCONTINUED | OUTPATIENT
Start: 2020-01-24 | End: 2020-01-24

## 2020-01-24 RX ORDER — SODIUM CHLORIDE, SODIUM LACTATE, POTASSIUM CHLORIDE, CALCIUM CHLORIDE 600; 310; 30; 20 MG/100ML; MG/100ML; MG/100ML; MG/100ML
INJECTION, SOLUTION INTRAVENOUS CONTINUOUS
Status: DISCONTINUED | OUTPATIENT
Start: 2020-01-24 | End: 2020-01-24

## 2020-01-24 RX ORDER — PROCHLORPERAZINE EDISYLATE 5 MG/ML
5 INJECTION INTRAMUSCULAR; INTRAVENOUS ONCE AS NEEDED
Status: DISCONTINUED | OUTPATIENT
Start: 2020-01-24 | End: 2020-01-24

## 2020-01-24 RX ORDER — METOCLOPRAMIDE 10 MG/1
10 TABLET ORAL ONCE
Status: DISCONTINUED | OUTPATIENT
Start: 2020-01-24 | End: 2020-01-24 | Stop reason: HOSPADM

## 2020-01-24 RX ORDER — HYDROMORPHONE HYDROCHLORIDE 1 MG/ML
0.4 INJECTION, SOLUTION INTRAMUSCULAR; INTRAVENOUS; SUBCUTANEOUS EVERY 5 MIN PRN
Status: DISCONTINUED | OUTPATIENT
Start: 2020-01-24 | End: 2020-01-24

## 2020-01-24 RX ORDER — HYDROMORPHONE HYDROCHLORIDE 1 MG/ML
0.6 INJECTION, SOLUTION INTRAMUSCULAR; INTRAVENOUS; SUBCUTANEOUS EVERY 5 MIN PRN
Status: DISCONTINUED | OUTPATIENT
Start: 2020-01-24 | End: 2020-01-24

## 2020-01-24 RX ORDER — HYDROCODONE BITARTRATE AND ACETAMINOPHEN 7.5; 325 MG/1; MG/1
1 TABLET ORAL
Qty: 10 TABLET | Refills: 0 | Status: SHIPPED | OUTPATIENT
Start: 2020-01-24 | End: 2020-03-09

## 2020-01-24 RX ORDER — HYDROCODONE BITARTRATE AND ACETAMINOPHEN 5; 325 MG/1; MG/1
1 TABLET ORAL AS NEEDED
Status: COMPLETED | OUTPATIENT
Start: 2020-01-24 | End: 2020-01-24

## 2020-01-24 RX ORDER — ONDANSETRON 2 MG/ML
4 INJECTION INTRAMUSCULAR; INTRAVENOUS ONCE AS NEEDED
Status: DISCONTINUED | OUTPATIENT
Start: 2020-01-24 | End: 2020-01-24

## 2020-01-24 RX ORDER — HYDROMORPHONE HYDROCHLORIDE 1 MG/ML
0.2 INJECTION, SOLUTION INTRAMUSCULAR; INTRAVENOUS; SUBCUTANEOUS EVERY 5 MIN PRN
Status: DISCONTINUED | OUTPATIENT
Start: 2020-01-24 | End: 2020-01-24

## 2020-01-24 RX ORDER — NALOXONE HYDROCHLORIDE 0.4 MG/ML
80 INJECTION, SOLUTION INTRAMUSCULAR; INTRAVENOUS; SUBCUTANEOUS AS NEEDED
Status: DISCONTINUED | OUTPATIENT
Start: 2020-01-24 | End: 2020-01-24

## 2020-01-24 RX ORDER — ACETAMINOPHEN 500 MG
1000 TABLET ORAL ONCE
Status: COMPLETED | OUTPATIENT
Start: 2020-01-24 | End: 2020-01-24

## 2020-01-24 RX ORDER — LIDOCAINE HYDROCHLORIDE 10 MG/ML
INJECTION, SOLUTION EPIDURAL; INFILTRATION; INTRACAUDAL; PERINEURAL AS NEEDED
Status: DISCONTINUED | OUTPATIENT
Start: 2020-01-24 | End: 2020-01-24 | Stop reason: SURG

## 2020-01-24 RX ORDER — MIDAZOLAM HYDROCHLORIDE 1 MG/ML
INJECTION INTRAMUSCULAR; INTRAVENOUS AS NEEDED
Status: DISCONTINUED | OUTPATIENT
Start: 2020-01-24 | End: 2020-01-24 | Stop reason: SURG

## 2020-01-24 RX ORDER — MORPHINE SULFATE 4 MG/ML
2 INJECTION, SOLUTION INTRAMUSCULAR; INTRAVENOUS EVERY 10 MIN PRN
Status: DISCONTINUED | OUTPATIENT
Start: 2020-01-24 | End: 2020-01-24

## 2020-01-24 RX ORDER — MORPHINE SULFATE 4 MG/ML
4 INJECTION, SOLUTION INTRAMUSCULAR; INTRAVENOUS EVERY 10 MIN PRN
Status: DISCONTINUED | OUTPATIENT
Start: 2020-01-24 | End: 2020-01-24

## 2020-01-24 RX ORDER — HALOPERIDOL 5 MG/ML
0.25 INJECTION INTRAMUSCULAR ONCE AS NEEDED
Status: DISCONTINUED | OUTPATIENT
Start: 2020-01-24 | End: 2020-01-24

## 2020-01-24 RX ORDER — FAMOTIDINE 20 MG/1
20 TABLET ORAL ONCE
Status: DISCONTINUED | OUTPATIENT
Start: 2020-01-24 | End: 2020-01-24 | Stop reason: HOSPADM

## 2020-01-24 RX ADMIN — LIDOCAINE HYDROCHLORIDE 50 MG: 10 INJECTION, SOLUTION EPIDURAL; INFILTRATION; INTRACAUDAL; PERINEURAL at 07:35:00

## 2020-01-24 RX ADMIN — SODIUM CHLORIDE, SODIUM LACTATE, POTASSIUM CHLORIDE, CALCIUM CHLORIDE: 600; 310; 30; 20 INJECTION, SOLUTION INTRAVENOUS at 07:29:00

## 2020-01-24 RX ADMIN — MIDAZOLAM HYDROCHLORIDE 2 MG: 1 INJECTION INTRAMUSCULAR; INTRAVENOUS at 07:31:00

## 2020-01-24 RX ADMIN — ONDANSETRON 4 MG: 2 INJECTION INTRAMUSCULAR; INTRAVENOUS at 07:31:00

## 2020-01-24 NOTE — INTERVAL H&P NOTE
Pre-op Diagnosis: carpal tunnel syndrome    The above referenced H&P was reviewed by Hank Zambrano MD on 1/24/2020, the patient was examined and no significant changes have occurred in the patient's condition since the H&P was performed.   I discus

## 2020-01-24 NOTE — BRIEF OP NOTE
Pre-Operative Diagnosis: carpal tunnel syndrome     Post-Operative Diagnosis: carpal tunnel syndrome      Procedure Performed:   Procedure(s):  right endoscopic carpal tunnel release    Surgeon(s) and Role:     * Bharat Caballero MD - Primary    Assi

## 2020-01-24 NOTE — ANESTHESIA POSTPROCEDURE EVALUATION
Patient: Martha Ada    Procedure Summary     Date:  01/24/20 Room / Location:  [facility] MAIN OR 01 / [address] [facility] MAIN OR    Anesthesia Start:  0330 Anesthesia Stop:  0865    Procedure:  ENDOSCOPIC CARPAL TUNNEL RELEASE (Right ) Diagnosis:  (carpal tunnel synd

## 2020-01-24 NOTE — OPERATIVE REPORT
[facility]    PATIENT'S NAME: OTF Chavarria   ATTENDING PHYSICIAN: Froilan Lebron MD   OPERATING PHYSICIAN: Froilan Lebron MD   PATIENT ACCOUNT#:   [id number]    LOCATION:  [facility] PACU [address] [place] [address]  MEDICAL RECORD #:    had excellent herniation of palmar fat into the cannula. The cannula and endoscope were withdrawn. A curved dissector was used to document complete release of the fascia proximally and the transverse carpal ligament distally.   Skin edges were reapproxima

## 2020-01-24 NOTE — ANESTHESIA PREPROCEDURE EVALUATION
Anesthesia PreOp Note    HPI:     Bob Dumont is a 37year old female who presents for preoperative consultation requested by: Salena Jeronimo MD    Date of Surgery: 1/24/2020    Procedure(s):  ENDOSCOPIC CARPAL TUNNEL RELEASE  Indication: 02/12/1995, 08/24/1997, 06/28/2003       Past Surgical History:   Procedure Laterality Date   • OTHER      removal of left ganglion cyst       Multiple Vitamins-Minerals (MULTIVITAMIN ADULT OR), Take by mouth., Disp: , Rfl: , Taking  Cholecalciferol (VITAM Social History    Socioeconomic History      Marital status:        Spouse name: Not on file      Number of children: Not on file      Years of education: Not on file      Highest education level: Not on file    Occupational History      Not on f Currently spends a great deal of time in the sun: No        Past Sunlamp Treatments for Acne: Not Asked        History of tanning: No        Hx of Spending [address] of Time in Sun: No        Bad sunburns in the past: No        Tanning Salons in the Past: as planned.   MEHDI FAIRBANKS  1/24/2020 7:54 AM

## 2020-01-24 NOTE — H&P
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       RECONSTRUCTIVE HISTORY     SUN EXPOSURE  Current no  Past no  Sunburns no  Tanning sal Cyclobenzaprine HCl 5 MG Oral Tab Take 1 tablet (5 mg total) by mouth 3 (three) times daily as needed for Muscle spasms.  30 tablet 0   • Cholecalciferol (VITAMIN D3) 1000 units Oral Cap TK ONE C PO D   0   • lidocaine 5 % External Patch Place 1 patch onto organizations: Not on file        Relationship status: Not on file      Intimate partner violence:        Fear of current or ex partner: Not on file        Emotionally abused: Not on file        Physically abused: Not on file        Forced sexual activity: - Normal, No abdominal tenderness  NEURO: Memory intact  PSYCH: Oriented to person, place, time, and situation, Appropriate mood and affect     Right hand full painless range of motion with excellent wrist hyperextension  Thenar intrinsics intact symmetric instructions, including therapy, will have significant impact on the final result, and could lead to permanent pain, stiffness, weakness, and loss of function.     Even with satisfactory healing, the hand/digit may not regain normal ROM or normal function.

## 2020-01-25 ENCOUNTER — TELEPHONE (OUTPATIENT)
Dept: SURGERY | Facility: CLINIC | Age: 40
End: 2020-01-25

## 2020-01-25 NOTE — TELEPHONE ENCOUNTER
Left message for PO patient to please call the office with any questions and/or concerns. Reminded patient of next OT appointment on 1/27, 2/05 and MD appointment on 2/13. Dr. Dorothy Sousa notified.

## 2020-01-27 ENCOUNTER — OFFICE VISIT (OUTPATIENT)
Dept: SURGERY | Facility: CLINIC | Age: 40
End: 2020-01-27
Payer: COMMERCIAL

## 2020-01-27 DIAGNOSIS — M62.81 DISTAL MUSCLE WEAKNESS: ICD-10-CM

## 2020-01-27 DIAGNOSIS — M25.641 JOINT STIFFNESS OF HAND, RIGHT: Primary | ICD-10-CM

## 2020-01-27 NOTE — PROGRESS NOTES
Carpal Tunnel Post - Op Note:    Subjective: I am doing well.       Objective:  Occupational Therapy completed the following educational areas status post elective carpal tunnel procedure:    1)  Dressing was removed and handwashing technique was reviewed u

## 2020-01-29 ENCOUNTER — OFFICE VISIT (OUTPATIENT)
Dept: SURGERY | Facility: CLINIC | Age: 40
End: 2020-01-29
Payer: COMMERCIAL

## 2020-01-29 DIAGNOSIS — M25.641 JOINT STIFFNESS OF HAND, RIGHT: Primary | ICD-10-CM

## 2020-01-29 DIAGNOSIS — M62.81 DISTAL MUSCLE WEAKNESS: ICD-10-CM

## 2020-01-29 PROCEDURE — 97166 OT EVAL MOD COMPLEX 45 MIN: CPT | Performed by: OCCUPATIONAL THERAPIST

## 2020-01-29 PROCEDURE — 97110 THERAPEUTIC EXERCISES: CPT | Performed by: OCCUPATIONAL THERAPIST

## 2020-01-29 NOTE — PROGRESS NOTES
OCCUPATIONAL THERAPY EVALUATION:   Ar Huntley   [id number]       SUBJECTIVE:    HX of Injury: Right upper extremity pain and numbness. Chief Complaint:   Right wrist and hand tightness. .    Precautions: 2 # lifting restriction with the right upp tasks.        Long term goals to be reached in x 1 month:    1) Full functional use of the involved extremity for self-care, leisure and work related tasks:  . Patient will be seen 2 x /week for 3 weeks or a total of 6 visits.    Pt. was advised shahla

## 2020-02-12 ENCOUNTER — OFFICE VISIT (OUTPATIENT)
Dept: SURGERY | Facility: CLINIC | Age: 40
End: 2020-02-12
Payer: COMMERCIAL

## 2020-02-12 DIAGNOSIS — M62.81 DISTAL MUSCLE WEAKNESS: ICD-10-CM

## 2020-02-12 DIAGNOSIS — M25.641 JOINT STIFFNESS OF HAND, RIGHT: Primary | ICD-10-CM

## 2020-02-12 PROCEDURE — 97166 OT EVAL MOD COMPLEX 45 MIN: CPT | Performed by: OCCUPATIONAL THERAPIST

## 2020-02-12 PROCEDURE — 97110 THERAPEUTIC EXERCISES: CPT | Performed by: OCCUPATIONAL THERAPIST

## 2020-02-12 NOTE — PROGRESS NOTES
OCCUPATIONAL THERAPY EVALUATION:   Ilsa Jauregui   [id number]       SUBJECTIVE:    HX of Injury: Right hand pain and numbness. Chief Complaint:   Right hand stiffness. Precautions: Work restrictions with the right upper extremity.   Premorbid Fun related tasks:  . Patient will be seen 1 x /week for 3 weeks or a total of 3 visits. Pt. was advised regarding the findings of this evaluation and agrees to the plan of care. Mariella Cates I have reviewed the treatment plan and concur.    Ra

## 2020-02-13 ENCOUNTER — OFFICE VISIT (OUTPATIENT)
Dept: SURGERY | Facility: CLINIC | Age: 40
End: 2020-02-13
Payer: COMMERCIAL

## 2020-02-13 DIAGNOSIS — M62.81 DISTAL MUSCLE WEAKNESS: ICD-10-CM

## 2020-02-13 DIAGNOSIS — G56.01 CARPAL TUNNEL SYNDROME, RIGHT: Primary | ICD-10-CM

## 2020-02-13 DIAGNOSIS — M25.641 JOINT STIFFNESS OF HAND, RIGHT: Primary | ICD-10-CM

## 2020-02-13 PROCEDURE — 97110 THERAPEUTIC EXERCISES: CPT | Performed by: OCCUPATIONAL THERAPIST

## 2020-02-13 PROCEDURE — 99024 POSTOP FOLLOW-UP VISIT: CPT | Performed by: PLASTIC SURGERY

## 2020-02-13 RX ORDER — HYDROCODONE BITARTRATE AND ACETAMINOPHEN 7.5; 325 MG/1; MG/1
1 TABLET ORAL
Qty: 10 TABLET | Refills: 0 | OUTPATIENT
Start: 2020-02-13

## 2020-02-13 NOTE — PROGRESS NOTES
Subjective: My hand is very sore. Objective:     Current level of performance:  ADL: Independent  Work: FMLA  Leisure: Not addressed.     Measurements/Tests:  ROM:  Testing By: janiya   Strength Right: 5 #      Strength Left: 25 #      Treatment P

## 2020-02-13 NOTE — PROGRESS NOTES
Surgery 1: RECTR  - Date: 01/24/20  - Days Since: 20    Patient here for surgical follow-up of right endoscopic carpal tunnel release   States she's doing \"Okay\"   Complaints of intermittent \"burning\" pain and upon palpitation of incisional site, rates

## 2020-02-14 ENCOUNTER — HOSPITAL ENCOUNTER (OUTPATIENT)
Age: 40
Discharge: HOME | End: 2020-02-14
Attending: EMERGENCY MEDICINE
Payer: COMMERCIAL

## 2020-02-14 VITALS
TEMPERATURE: 98 F | HEART RATE: 72 BPM | HEIGHT: 63 IN | BODY MASS INDEX: 28.35 KG/M2 | WEIGHT: 160 LBS | RESPIRATION RATE: 18 BRPM | OXYGEN SATURATION: 97 % | DIASTOLIC BLOOD PRESSURE: 77 MMHG | SYSTOLIC BLOOD PRESSURE: 114 MMHG

## 2020-02-14 DIAGNOSIS — L08.9 WOUND INFECTION: Primary | ICD-10-CM

## 2020-02-14 DIAGNOSIS — T14.8XXA WOUND INFECTION: Primary | ICD-10-CM

## 2020-02-14 LAB — B-HCG UR QL: NEGATIVE

## 2020-02-14 PROCEDURE — 99214 OFFICE O/P EST MOD 30 MIN: CPT

## 2020-02-14 PROCEDURE — 81025 URINE PREGNANCY TEST: CPT

## 2020-02-14 PROCEDURE — 87070 CULTURE OTHR SPECIMN AEROBIC: CPT | Performed by: EMERGENCY MEDICINE

## 2020-02-14 PROCEDURE — 87205 SMEAR GRAM STAIN: CPT | Performed by: EMERGENCY MEDICINE

## 2020-02-14 PROCEDURE — 87077 CULTURE AEROBIC IDENTIFY: CPT | Performed by: EMERGENCY MEDICINE

## 2020-02-14 RX ORDER — CEPHALEXIN 500 MG/1
500 CAPSULE ORAL 2 TIMES DAILY
Qty: 10 CAPSULE | Refills: 0 | Status: SHIPPED | OUTPATIENT
Start: 2020-02-14 | End: 2020-02-19

## 2020-02-14 RX ORDER — FLUCONAZOLE 200 MG/1
200 TABLET ORAL DAILY
Qty: 2 TABLET | Refills: 0 | Status: SHIPPED | OUTPATIENT
Start: 2020-02-14 | End: 2020-03-09

## 2020-02-14 NOTE — ED NOTES
Wound cleansed with saline bacitracin oint band aide dressing splint has at home inst to wear as directed- wash hands daily with antimicrobial soap discussed dressings 3 times daily.  meds as directed and f/u with hand surgeon Thursday in ofice call and katalina

## 2020-02-14 NOTE — ED PROVIDER NOTES
Patient Seen in: [facility] [address] [place] [address]    History   Patient presents with:  Cellulitis    Stated Complaint: Hand Infection    HPI    Patient complains of right hand infection, patient had a carpal tunnel surgery, 2 weeks ago, and a mouth 3 (three) times daily as needed for Muscle spasms. Cholecalciferol (VITAMIN D3) 1000 units Oral Cap,  TK ONE C PO D   acetaminophen 325 MG Oral Tab,  Take 325 mg by mouth every 6 (six) hours as needed for Pain.    HYDROcodone-acetaminophen 7.5-325 M murmur  GI: abdomen is soft and non tender, no masses, nl bowel sounds   EXTREMITIES: from, 5/5 strength in all 4 ext, no edema  Some mild discharge noted at incision site on right palm area, culture taken otherwise healing well clean and dry no redness no days.  Krystal Cárdenas: 10 capsule Refills: 0    fluconazole 200 MG Oral Tab  Take 1 tablet (200 mg total) by mouth daily.  If no improvement in 2 days, please take the 2nd dose  Qty: 2 tablet Refills: 0

## 2020-02-14 NOTE — ED INITIAL ASSESSMENT (HPI)
Had carpel tunnel surg 2 weeks ago and for 4 days noticed opus and drainage from site could not see hand md till Thursday and was late for pcp so they would not see her sent here. Open suture site with white discharge palm noted Neg preg test 1/24.  Keyshawn a

## 2020-02-17 RX ORDER — HYDROCODONE BITARTRATE AND ACETAMINOPHEN 7.5; 325 MG/1; MG/1
1 TABLET ORAL
Qty: 10 TABLET | Refills: 0 | OUTPATIENT
Start: 2020-02-17

## 2020-02-20 ENCOUNTER — TELEPHONE (OUTPATIENT)
Dept: SURGERY | Facility: CLINIC | Age: 40
End: 2020-02-20

## 2020-02-20 NOTE — TELEPHONE ENCOUNTER
FYI:    Pt was given work status stating to continue OT 2x a week for the next month 02/13/20. Pt was to start OT on 02/18. She has cx the last two appt via My Chart.

## 2020-02-24 ENCOUNTER — TELEPHONE (OUTPATIENT)
Dept: SURGERY | Facility: CLINIC | Age: 40
End: 2020-02-24

## 2020-02-24 RX ORDER — HYDROCODONE BITARTRATE AND ACETAMINOPHEN 7.5; 325 MG/1; MG/1
1 TABLET ORAL
Qty: 10 TABLET | Refills: 0 | OUTPATIENT
Start: 2020-02-24

## 2020-02-24 NOTE — TELEPHONE ENCOUNTER
Pt 's pharmacy requested refill of  post-op Norco.  Left voice mail for patient that we received a request from her pharmacy to refill her [place], we are refusing this request.  Requested she please call our office regarding her missing post-op appointments

## 2020-02-25 ENCOUNTER — OFFICE VISIT (OUTPATIENT)
Dept: SURGERY | Facility: CLINIC | Age: 40
End: 2020-02-25
Payer: COMMERCIAL

## 2020-02-25 DIAGNOSIS — M62.81 DISTAL MUSCLE WEAKNESS: ICD-10-CM

## 2020-02-25 DIAGNOSIS — M25.641 JOINT STIFFNESS OF HAND, RIGHT: Primary | ICD-10-CM

## 2020-02-25 PROCEDURE — 97110 THERAPEUTIC EXERCISES: CPT | Performed by: OCCUPATIONAL THERAPIST

## 2020-02-25 PROCEDURE — 99070 SPECIAL SUPPLIES PHYS/QHP: CPT | Performed by: OCCUPATIONAL THERAPIST

## 2020-02-25 RX ORDER — LIDOCAINE 50 MG/G
1 PATCH TOPICAL EVERY 24 HOURS
Qty: 30 PATCH | Refills: 1 | OUTPATIENT
Start: 2020-02-25

## 2020-02-25 NOTE — TELEPHONE ENCOUNTER
On 02/14/2020 Patient reported that she is not using patches. Called patient to confirm if she is requesting refill but no response.       RX denied 02/14/2020 was reported she is not using and last refill was 05/16/2019

## 2020-02-25 NOTE — PROGRESS NOTES
Subjective: I went to the ER 2.14.2020 with drainage from my right hand. Objective:     Current level of performance:  ADL: Independent with self care task needs.   Work: [employer]  Leisure: Not addressed    Measurements/Tests:  ROM:         Full right w

## 2020-02-27 ENCOUNTER — OFFICE VISIT (OUTPATIENT)
Dept: SURGERY | Facility: CLINIC | Age: 40
End: 2020-02-27
Payer: COMMERCIAL

## 2020-02-27 DIAGNOSIS — M62.81 DISTAL MUSCLE WEAKNESS: ICD-10-CM

## 2020-02-27 DIAGNOSIS — M25.641 JOINT STIFFNESS OF HAND, RIGHT: Primary | ICD-10-CM

## 2020-02-27 PROCEDURE — 97035 APP MDLTY 1+ULTRASOUND EA 15: CPT | Performed by: OCCUPATIONAL THERAPIST

## 2020-02-27 PROCEDURE — 97022 WHIRLPOOL THERAPY: CPT | Performed by: OCCUPATIONAL THERAPIST

## 2020-02-27 NOTE — PROGRESS NOTES
Subjective:  I still have a lot of pain in my right hand      Objective:     Current level of performance:  ADL: Independent  Work: On FMLA  Leisure: Not addressed. Measurements/Tests:  ROM:         Not formally measured this day.          Treatment Prov

## 2020-03-03 ENCOUNTER — OFFICE VISIT (OUTPATIENT)
Dept: SURGERY | Facility: CLINIC | Age: 40
End: 2020-03-03
Payer: COMMERCIAL

## 2020-03-03 DIAGNOSIS — M62.81 DISTAL MUSCLE WEAKNESS: ICD-10-CM

## 2020-03-03 DIAGNOSIS — M25.641 JOINT STIFFNESS OF HAND, RIGHT: Primary | ICD-10-CM

## 2020-03-03 DIAGNOSIS — M79.601 PAIN OF RIGHT UPPER EXTREMITY: ICD-10-CM

## 2020-03-03 PROCEDURE — 97035 APP MDLTY 1+ULTRASOUND EA 15: CPT | Performed by: OCCUPATIONAL THERAPIST

## 2020-03-03 PROCEDURE — 97022 WHIRLPOOL THERAPY: CPT | Performed by: OCCUPATIONAL THERAPIST

## 2020-03-03 NOTE — PROGRESS NOTES
Subjective: My right hand in the palmar surface is very painful. I am not able to sleep. I have difficulty with self care task needs.       Objective:     Current level of performance:  ADL: Assistance required with clothing fasteners, zippers, buttons, vijaya

## 2020-03-06 ENCOUNTER — PATIENT MESSAGE (OUTPATIENT)
Dept: NEUROLOGY | Facility: CLINIC | Age: 40
End: 2020-03-06

## 2020-03-06 ENCOUNTER — OFFICE VISIT (OUTPATIENT)
Dept: NEUROLOGY | Facility: CLINIC | Age: 40
End: 2020-03-06
Payer: COMMERCIAL

## 2020-03-06 VITALS — BODY MASS INDEX: 28.35 KG/M2 | HEIGHT: 63 IN | WEIGHT: 160 LBS | RESPIRATION RATE: 17 BRPM

## 2020-03-06 DIAGNOSIS — M67.921 TENDINOPATHY OF RIGHT ELBOW: ICD-10-CM

## 2020-03-06 DIAGNOSIS — M77.02 MEDIAL EPICONDYLITIS OF ELBOW, LEFT: ICD-10-CM

## 2020-03-06 DIAGNOSIS — M25.522 LEFT ELBOW PAIN: ICD-10-CM

## 2020-03-06 DIAGNOSIS — R20.0 NUMBNESS AND TINGLING IN LEFT HAND: ICD-10-CM

## 2020-03-06 DIAGNOSIS — M77.11 RIGHT LATERAL EPICONDYLITIS: Primary | ICD-10-CM

## 2020-03-06 DIAGNOSIS — R20.0 NUMBNESS AND TINGLING IN RIGHT HAND: ICD-10-CM

## 2020-03-06 DIAGNOSIS — R20.2 NUMBNESS AND TINGLING IN LEFT HAND: ICD-10-CM

## 2020-03-06 DIAGNOSIS — M25.521 RIGHT ELBOW PAIN: ICD-10-CM

## 2020-03-06 DIAGNOSIS — R20.2 NUMBNESS AND TINGLING IN RIGHT HAND: ICD-10-CM

## 2020-03-06 DIAGNOSIS — M77.02 MEDIAL EPICONDYLITIS OF LEFT ELBOW: ICD-10-CM

## 2020-03-06 DIAGNOSIS — M77.11 LATERAL EPICONDYLITIS OF RIGHT ELBOW: ICD-10-CM

## 2020-03-06 DIAGNOSIS — G56.03 BILATERAL CARPAL TUNNEL SYNDROME: ICD-10-CM

## 2020-03-06 PROCEDURE — 99214 OFFICE O/P EST MOD 30 MIN: CPT | Performed by: PHYSICAL MEDICINE & REHABILITATION

## 2020-03-06 NOTE — PROGRESS NOTES
[address]  Progress Note    CHIEF COMPLAINT:  Patient presents with:  Elbow Pain: LOV 12/19/19 Pt states that the elbow started back hurting after her surgery and PT . the exercises casued the pain to come ENDOSCOPIC CARPAL TUNNEL RELEASE Right 1/24/2020    Performed by Paramjit Mcgee MD at [facility] MAIN OR   • OTHER      removal of left ganglion cyst   • WRIST ARTHROSCOP,RELEASE [facility] LIG Right 01/24/2020    Right endoscopic carpal tunnel release       S Throat    REVIEW OF SYSTEMS:   Review of Systems   Constitutional: Negative. HENT: Negative. Eyes: Negative. Respiratory: Negative. Cardiovascular: Negative. Gastrointestinal: Negative. Genitourinary: Negative.     Musculoskeletal: As per the RIGHT        Gait:  Normal    Data  Admission on 02/14/2020, Discharged on 02/14/2020   Component Date Value Ref Range Status   • Aerobic Culture Result 02/14/2020 No Staph aureus, Staph lugdunensis, Beta Strep, or Pseudomonas aeruginosa isolated   Cor on 6/24/2019 at 14:35       Approved by (CST): Mellissa Medrano MD on 6/24/2019 at 14:36              PROCEDURE:  XR ELBOW, COMPLETE (MIN 3 VIEWS), RIGHT (CPT=73080)     COMPARISON: [facility], XR WRIST COMPLETE (MIN 3 VIEWS), RIGHT ( follow-up with me in 6 weeks. She should continue doing physical therapy.     There is no evidence for a radiculopathy, plexopathy, polyneuropathy, or myopathy affecting the examined upper extremity.     ASSESSMENT AND PLAN:  The patient is a 47-year-old f

## 2020-03-07 NOTE — TELEPHONE ENCOUNTER
From: Sunday Calloway  To:  Dayton Mccall MD  Sent: 3/6/2020 6:36 PM CST  Subject: Visit Follow-up Question    Dr. Dayton Mccall I was reading the doctor's notes on 3/06/2020 were you stated that I was non-compliant because you advised me not to have the ca

## 2020-03-09 ENCOUNTER — OFFICE VISIT (OUTPATIENT)
Dept: SURGERY | Facility: CLINIC | Age: 40
End: 2020-03-09
Payer: COMMERCIAL

## 2020-03-09 DIAGNOSIS — G56.01 CARPAL TUNNEL SYNDROME, RIGHT: Primary | ICD-10-CM

## 2020-03-09 PROCEDURE — 99024 POSTOP FOLLOW-UP VISIT: CPT | Performed by: PLASTIC SURGERY

## 2020-03-09 NOTE — PROGRESS NOTES
Surgery 1: RECTR  - Date: 01/24/20  - Days Since: 45  Constant R wrist and elbow pain. Rates pain 8/10. Taking tylenol prn,helpful. Less numbness R thumb.   Wearing bilateral splints that were made by another therapist at night and intermittently during

## 2020-03-12 ENCOUNTER — OFFICE VISIT (OUTPATIENT)
Dept: SURGERY | Facility: CLINIC | Age: 40
End: 2020-03-12
Payer: COMMERCIAL

## 2020-03-12 DIAGNOSIS — M25.641 JOINT STIFFNESS OF HAND, RIGHT: Primary | ICD-10-CM

## 2020-03-12 DIAGNOSIS — M62.81 DISTAL MUSCLE WEAKNESS: ICD-10-CM

## 2020-03-12 PROCEDURE — 97035 APP MDLTY 1+ULTRASOUND EA 15: CPT | Performed by: OCCUPATIONAL THERAPIST

## 2020-03-12 PROCEDURE — 97022 WHIRLPOOL THERAPY: CPT | Performed by: OCCUPATIONAL THERAPIST

## 2020-03-12 NOTE — PROGRESS NOTES
Subjective:  I am feeling better.       Objective:     Current level of performance:  ADL: Independent  Work: FMLA  Leisure: Family    Measurements/Tests:  ROM:         NT         Treatment Provided this day: Fluidotherapy to the right wrist and hand, x 20

## 2020-03-16 ENCOUNTER — OFFICE VISIT (OUTPATIENT)
Dept: FAMILY MEDICINE CLINIC | Facility: CLINIC | Age: 40
End: 2020-03-16
Payer: COMMERCIAL

## 2020-03-16 VITALS
HEART RATE: 79 BPM | TEMPERATURE: 98 F | BODY MASS INDEX: 28.88 KG/M2 | WEIGHT: 163 LBS | SYSTOLIC BLOOD PRESSURE: 130 MMHG | DIASTOLIC BLOOD PRESSURE: 81 MMHG | HEIGHT: 63 IN

## 2020-03-16 DIAGNOSIS — G56.03 BILATERAL CARPAL TUNNEL SYNDROME: Primary | ICD-10-CM

## 2020-03-16 DIAGNOSIS — M25.522 CHRONIC ELBOW PAIN, LEFT: ICD-10-CM

## 2020-03-16 DIAGNOSIS — G89.29 CHRONIC ELBOW PAIN, LEFT: ICD-10-CM

## 2020-03-16 DIAGNOSIS — M79.641 RIGHT HAND PAIN: ICD-10-CM

## 2020-03-16 DIAGNOSIS — G56.22 NEURITIS OF LEFT ULNAR NERVE: ICD-10-CM

## 2020-03-16 DIAGNOSIS — M79.644 PAIN OF RIGHT THUMB: ICD-10-CM

## 2020-03-16 PROCEDURE — 99215 OFFICE O/P EST HI 40 MIN: CPT | Performed by: FAMILY MEDICINE

## 2020-03-16 RX ORDER — PREGABALIN 75 MG/1
75 CAPSULE ORAL 2 TIMES DAILY
Qty: 60 CAPSULE | Refills: 1 | Status: SHIPPED | OUTPATIENT
Start: 2020-03-16 | End: 2021-01-13

## 2020-03-16 RX ORDER — TRAMADOL HYDROCHLORIDE 50 MG/1
50 TABLET ORAL EVERY 8 HOURS PRN
Qty: 90 TABLET | Refills: 0 | Status: SHIPPED | OUTPATIENT
Start: 2020-03-16 | End: 2021-06-29

## 2020-03-16 NOTE — PROGRESS NOTES
Patient ID: Rashida Mckeon is a 37year old female.     HPI  Patient presents with:  Pain: Chronis pain for tendonittis, bilateral carpal tunnel, pt states she would like pain meds but does not want to get addicted, pt also wants blood workk comlplet 163 lb (73.9 kg)  03/06/20 : 160 lb (72.6 kg)  02/14/20 : 160 lb (72.6 kg)  01/24/20 : 160 lb (72.6 kg)  01/15/20 : 166 lb (75.3 kg)  12/19/19 : 156 lb (70.8 kg)      BMI Readings from Last 6 Encounters:  03/16/20 : 28.87 kg/m²  03/06/20 : 28.34 kg/m²  02/ % External Patch Place 1 patch onto the skin daily.  (Patient not taking: Reported on 3/16/2020 ) 30 patch 1     Allergies:  Ibuprofen               SWELLING, Tightness in Throat   PHYSICAL EXAM:   Physical Exam   Physical Exam   Constitutional: Patient is syndrome  -     traMADol HCl 50 MG Oral Tab; Take 1 tablet (50 mg total) by mouth every 8 (eight) hours as needed for Pain. -     pregabalin (LYRICA) 75 MG Oral Cap; Take 1 capsule (75 mg total) by mouth 2 (two) times daily.  Was already on gabapentin in t and continues to have pain. She has carpal tunnel pain.  -     ORTHOPEDIC - INTERNAL    Continue wearing the splints.   He should see the occupational therapist to Dr. Zuniga [facility] office and asked them if they can do ultrasound more on the affected areas

## 2020-03-17 ENCOUNTER — OFFICE VISIT (OUTPATIENT)
Dept: SURGERY | Facility: CLINIC | Age: 40
End: 2020-03-17
Payer: COMMERCIAL

## 2020-03-17 DIAGNOSIS — M25.641 JOINT STIFFNESS OF HAND, RIGHT: Primary | ICD-10-CM

## 2020-03-17 DIAGNOSIS — M62.81 DISTAL MUSCLE WEAKNESS: ICD-10-CM

## 2020-03-17 PROCEDURE — 97035 APP MDLTY 1+ULTRASOUND EA 15: CPT | Performed by: OCCUPATIONAL THERAPIST

## 2020-03-17 PROCEDURE — 97022 WHIRLPOOL THERAPY: CPT | Performed by: OCCUPATIONAL THERAPIST

## 2020-03-17 NOTE — PROGRESS NOTES
Subjective:  My right hand is feeling better.       Objective:     Current level of performance:  ADL: Independent  Work: FMLA  Leisure: Family    Measurements/Tests:  ROM:            N/A      Treatment Provided this day: Fluidotherapy to the right wrist an

## 2020-03-18 ENCOUNTER — PATIENT MESSAGE (OUTPATIENT)
Dept: FAMILY MEDICINE CLINIC | Facility: CLINIC | Age: 40
End: 2020-03-18

## 2020-03-19 ENCOUNTER — NURSE TRIAGE (OUTPATIENT)
Dept: OTHER | Age: 40
End: 2020-03-19

## 2020-03-19 DIAGNOSIS — J35.8 TONSILLAR EXUDATE: Primary | ICD-10-CM

## 2020-03-19 NOTE — TELEPHONE ENCOUNTER
----- Message from Ar Huntley sent at 3/18/2020  8:03 PM CDT -----  Regarding: Prescription Question  Contact: [phone]  Dear Dr. Tang Rivera and personal, have strep throat  symptoms, I have strep throat infection once a year, is there any way yo

## 2020-03-19 NOTE — TELEPHONE ENCOUNTER
Action Requested: Summary for Provider     []  Critical Lab, Recommendations Needed  [x] Need Additional Advice  []   FYI    []   Need Orders  [x] Need Medications Sent to Pharmacy  []  Other     SUMMARY: Travel Screening completed.  Patient calling for ant

## 2020-03-19 NOTE — TELEPHONE ENCOUNTER
From: Tita Amend  To:  Dell Quiros DO  Sent: 3/18/2020 8:03 PM CDT  Subject: Prescription Question    Dear Dr. Zaria Meadows and personal, have strep throat symptoms, I have strep throat infection once a year, is there any way you can send a prescript

## 2020-03-20 RX ORDER — AMOXICILLIN AND CLAVULANATE POTASSIUM 875; 125 MG/1; MG/1
1 TABLET, FILM COATED ORAL 2 TIMES DAILY
Qty: 14 TABLET | Refills: 0 | Status: SHIPPED | OUTPATIENT
Start: 2020-03-20 | End: 2020-03-27

## 2020-03-23 NOTE — TELEPHONE ENCOUNTER
Patient was called this day, to check on her present functional status associated with her RECTR from 1.24.20.     No answer: message was left to continue her home program, and if she has continue pain in her right elbow, to contact Dr. Guille Morales,  Otherwise sh

## 2020-03-31 NOTE — TELEPHONE ENCOUNTER
Monika Brown.  Byron Aguilar MD, [facility]  Physical Medicine and Rehabilitation/Sports Medicine  MEDICAL CENTER [facility]

## 2020-05-06 ENCOUNTER — VIRTUAL PHONE E/M (OUTPATIENT)
Dept: NEUROLOGY | Facility: CLINIC | Age: 40
End: 2020-05-06
Payer: COMMERCIAL

## 2020-05-06 ENCOUNTER — TELEPHONE (OUTPATIENT)
Dept: NEUROLOGY | Facility: CLINIC | Age: 40
End: 2020-05-06

## 2020-05-06 DIAGNOSIS — R20.2 NUMBNESS AND TINGLING IN RIGHT HAND: ICD-10-CM

## 2020-05-06 DIAGNOSIS — M77.02 MEDIAL EPICONDYLITIS OF LEFT ELBOW: ICD-10-CM

## 2020-05-06 DIAGNOSIS — M77.11 RIGHT LATERAL EPICONDYLITIS: Primary | ICD-10-CM

## 2020-05-06 DIAGNOSIS — M25.522 LEFT ELBOW PAIN: ICD-10-CM

## 2020-05-06 DIAGNOSIS — M67.921 TENDINOPATHY OF RIGHT ELBOW: ICD-10-CM

## 2020-05-06 DIAGNOSIS — R20.2 NUMBNESS AND TINGLING IN LEFT HAND: ICD-10-CM

## 2020-05-06 DIAGNOSIS — Z98.890 HISTORY OF CARPAL TUNNEL RELEASE: ICD-10-CM

## 2020-05-06 DIAGNOSIS — G56.03 BILATERAL CARPAL TUNNEL SYNDROME: ICD-10-CM

## 2020-05-06 DIAGNOSIS — R20.0 NUMBNESS AND TINGLING IN RIGHT HAND: ICD-10-CM

## 2020-05-06 DIAGNOSIS — M25.521 RIGHT ELBOW PAIN: ICD-10-CM

## 2020-05-06 DIAGNOSIS — R20.0 NUMBNESS AND TINGLING IN LEFT HAND: ICD-10-CM

## 2020-05-06 PROCEDURE — 99213 OFFICE O/P EST LOW 20 MIN: CPT | Performed by: PHYSICAL MEDICINE & REHABILITATION

## 2020-05-06 NOTE — TELEPHONE ENCOUNTER
Spoke to patient and notified her denial from insurance. She wanted to ensure that it was checked with work comp as well. Explained that I would transfer to  to get work comp information on file as I do not see it in chart.  Transferred to front d

## 2020-05-06 NOTE — TELEPHONE ENCOUNTER
[employer] for authorization of approval for MRI left elbow Case # [id number], will fax clinical note.  Faxed note pending approval.

## 2020-05-06 NOTE — TELEPHONE ENCOUNTER
Called [employer] for authorization of approval of PRP RIGHT lateral epicondyle cpt code 0232T. Talked to Eliseo Reyes. who states it is NOT a covered benefit. Reference # [id number]. Will  inform Nursing.

## 2020-05-06 NOTE — PATIENT INSTRUCTIONS
1) My office will call you once the MRI is approved by your insurance. You should then schedule the MRI and call my office again to make an appointment with me 2-3 days after your exam for review of your images and a further plan.  If your MRI is not being

## 2020-05-06 NOTE — PROGRESS NOTES
[address] [place]  Televisit Note    CHIEF COMPLAINT:      Virtual/Telephone Check-In    Rashida Ready verbally consents to a Virtual/Telephone Check-In service on 05/06/20.   Patient understands and accepts History    Occupational History      Not on file    Tobacco Use      Smoking status: Never Smoker      Smokeless tobacco: Never Used    Substance and Sexual Activity      Alcohol use: Not Currently        Alcohol/week: 0.0 standard drinks      Drug use: No spine and elbows right   PROCEDURE:  XR CERVICAL SPINE AP LAT FLEX EXT EM (HSG=89569)     [address], X CERV AP LAT, 5/29/2012, 23:20.     INDICATIONS:  Posterior neck pain x3 months.  Chronic bilateral arm pain and numbness.    of the right elbow.               EMG of the left upper extremity performed on 12/13/2019 demonstrates the following:  Conclusion  This is a normal study.     There is no evidence for a radiculopathy, plexopathy, mononeuropathy, polyneuropathy, or myopathy treatment plans or potential referrals.        RTC in 2 weeks to review MRI findings       Right lateral epicondylitis  (primary encounter diagnosis)  Left elbow pain  Medial epicondylitis of left elbow  Right elbow pain  Numbness and tingling in left hand

## 2020-05-11 NOTE — TELEPHONE ENCOUNTER
Pt. is requesting MRI Left elbow and PRP right epicondyle under [employer]. Claim # [id number]  Estelle Ariza [employer] for authorization of approval of MRI Left elbow and PRP right epicondyle. L/m on Expert TA 's v/m requesting above. Boyd Porter

## 2020-05-19 ENCOUNTER — TELEPHONE (OUTPATIENT)
Dept: NEUROLOGY | Facility: CLINIC | Age: 40
End: 2020-05-19

## 2020-06-03 ENCOUNTER — LAB ENCOUNTER (OUTPATIENT)
Dept: LAB | Age: 40
End: 2020-06-03
Attending: FAMILY MEDICINE
Payer: COMMERCIAL

## 2020-06-03 DIAGNOSIS — E78.2 MIXED HYPERLIPIDEMIA: ICD-10-CM

## 2020-06-03 DIAGNOSIS — E55.9 VITAMIN D DEFICIENCY: ICD-10-CM

## 2020-06-03 DIAGNOSIS — D72.829 LEUKOCYTOSIS, UNSPECIFIED TYPE: ICD-10-CM

## 2020-06-03 PROCEDURE — 80048 BASIC METABOLIC PNL TOTAL CA: CPT

## 2020-06-03 PROCEDURE — 84443 ASSAY THYROID STIM HORMONE: CPT

## 2020-06-03 PROCEDURE — 80061 LIPID PANEL: CPT

## 2020-06-03 PROCEDURE — 84460 ALANINE AMINO (ALT) (SGPT): CPT

## 2020-06-03 PROCEDURE — 82306 VITAMIN D 25 HYDROXY: CPT

## 2020-06-03 PROCEDURE — 36415 COLL VENOUS BLD VENIPUNCTURE: CPT

## 2020-06-03 PROCEDURE — 84450 TRANSFERASE (AST) (SGOT): CPT

## 2020-06-03 PROCEDURE — 85025 COMPLETE CBC W/AUTO DIFF WBC: CPT

## 2020-06-08 ENCOUNTER — OFFICE VISIT (OUTPATIENT)
Dept: FAMILY MEDICINE CLINIC | Facility: CLINIC | Age: 40
End: 2020-06-08
Payer: COMMERCIAL

## 2020-06-08 VITALS
BODY MASS INDEX: 29.41 KG/M2 | HEIGHT: 63 IN | HEART RATE: 85 BPM | DIASTOLIC BLOOD PRESSURE: 85 MMHG | SYSTOLIC BLOOD PRESSURE: 130 MMHG | WEIGHT: 166 LBS

## 2020-06-08 DIAGNOSIS — M25.521 RIGHT ELBOW PAIN: ICD-10-CM

## 2020-06-08 DIAGNOSIS — M25.522 LEFT ELBOW PAIN: ICD-10-CM

## 2020-06-08 DIAGNOSIS — Z00.00 WELL ADULT EXAM: ICD-10-CM

## 2020-06-08 DIAGNOSIS — G56.03 BILATERAL CARPAL TUNNEL SYNDROME: ICD-10-CM

## 2020-06-08 DIAGNOSIS — Z12.31 SCREENING MAMMOGRAM, ENCOUNTER FOR: ICD-10-CM

## 2020-06-08 DIAGNOSIS — E78.2 MIXED HYPERLIPIDEMIA: Primary | ICD-10-CM

## 2020-06-08 DIAGNOSIS — E78.00 HYPERCHOLESTEROLEMIA: ICD-10-CM

## 2020-06-08 PROCEDURE — 99213 OFFICE O/P EST LOW 20 MIN: CPT | Performed by: NURSE PRACTITIONER

## 2020-06-08 RX ORDER — ATORVASTATIN CALCIUM 10 MG/1
10 TABLET, FILM COATED ORAL NIGHTLY
Qty: 90 TABLET | Refills: 3 | Status: SHIPPED | OUTPATIENT
Start: 2020-06-08

## 2020-06-08 NOTE — ASSESSMENT & PLAN NOTE
Start atorvastatin 10 mg I po q hs  Please aim to eat a diet high in fresh fruits and vegetables, lean protein sources, complex carbohydrates and limited processed and fast foods.   Try to get at least 150 minutes of exercise per week-a combination of weigh

## 2020-06-08 NOTE — PROGRESS NOTES
HPI  Pt presents for physical exam. Had labs drawn last week. Last pap-done last year-outside ob group-[facility]l  Last mammogram-never  Has iud    fm[facility]-high cholesterol    Had right wrist surgery-January-symptoms are improving a lot.   Still having tennis elbo arthroscop,release xvers lig Right 01/24/2020    Right endoscopic carpal tunnel release       Family History   Problem Relation Age of Onset   • Lipids Mother    • Other (Other) Mother         cholesterol   • Cancer Brother         Leukemia   • Asthma Othe daily        Occupational Exposure: Not Asked        Hobby Hazards: Not Asked        Sleep Concern: Not Asked        Stress Concern: Not Asked        Weight Concern: Not Asked        Special Diet: Not Asked        Back Care: Not Asked        Exercise: Not reactive to light. Conjunctivae and EOM are normal. Right eye exhibits no discharge. Left eye exhibits no discharge. Neck: Normal range of motion. Neck supple. No thyromegaly present. Cardiovascular: Normal rate, regular rhythm and normal heart sounds. %  0.2   Glucose      70 - 99 mg/dL 73    Sodium      136 - 145 mmol/L 134 (L)    Potassium      3.5 - 5.1 mmol/L 3.7    Chloride      98 - 112 mmol/L 103    Carbon Dioxide, Total      21.0 - 32.0 mmol/L 27.0    ANION GAP      0 - 18 mmol/L 4    BUN      7 of weight resistance and cardio is preferred.              Screening mammogram, encounter for    Relevant Orders    [facility] ESE 2D+3D SCREENING BILAT (CPT=77067/53202)      Other Visit Diagnoses     Right elbow pain        Relevant Orders    ORTHOPEDIC - INTER

## 2020-06-08 NOTE — ASSESSMENT & PLAN NOTE
Screening labs reviewed  Mammogram ordered  req results pap  Please aim to eat a diet high in fresh fruits and vegetables, lean protein sources, complex carbohydrates and limited processed and fast foods.   Try to get at least 150 minutes of exercise per we

## 2020-06-08 NOTE — PATIENT INSTRUCTIONS
Prevention Guidelines, Women Ages 36 to 52  Screening tests and vaccines are an important part of managing your health. A screening test is done to find diseases in people who don't have any symptoms.  The goal is to find a disease early so lifestyle hays · Flexible sigmoidoscopy every 5 years, or  · Colonoscopy every 10 years, or  · CT colonography (virtual colonoscopy) every 5 years, or  · Yearly fecal occult blood test, or  · Yearly fecal immunochemical test every year, or  · Stool DNA test, every 3 year Chickenpox (varicella) All women in this age group who have no record of this infection or vaccine 2 doses; the second dose should be given at least 4 weeks after the first dose   Hepatitis A Women at increased risk for infection–talk with your healthcare Use of tobacco and the health effects it can cause All women in this age group Every exam   1 American Diabetes Association  2 American College of Obstetricians and Gynecologists   [address]  [address] [address]  [place] · More X-rays or an ultrasound are sometimes needed. If not done at the time of your initial mammogram, you’ll be called to schedule them. · You should get your test results in writing. Ask about this at your appointment.   · Have mammograms as often as yo

## 2020-06-08 NOTE — ASSESSMENT & PLAN NOTE
8/30/2018         RE: Pete Sheldon  [address] [place] [address]        Dear Colleague,    Thank you for referring your patient, Pete Sheldon, to the [facility]. Please see a copy of my visit note below.    ENDOCRINOLOGY CLINIC NOTE:  Name: Pete Sheldon  Seen for f/u of Diabetes.  He is accompanied by his wife.  HPI:  Pete Sheldon is a 78  year old male who presents for the evaluation/management of:  Was in the hospital 1/2018 with new diagnosis of stroke. Was in rehab after that.  Currently he is taking Lantus 30 Units/day, Humalog 7/5/7 Units with breakfast/lunch/dinner respectively.    Of note he is having bedtime snack almost every days.  Typical bedtime snack is whole bagel/ PB sandwich.  Has variable blood sugar pattern. This has been noted consistently in past and with multiple dose adjustment.  I doubt if he has clear understanding of insulin dosing and the carbohydrates.  Also trouble with memory.  I discussed continuous glucose monitor with him in past but he does not want sensors.  Low C-peptide.     Today in clinic patient was noted to be shaky, sweaty and was nauseous.  He reports that he took his insulin this morning and had regular breakfast.  Unfortunately we will not able to check blood sugar right away but blood sugar was checked after he had a can of apple juice and it was 155.  He denies similar episodes occurring at home.  He reports that he is able to feel when blood sugar is dropping low.  And is able to correct that.    1. Type 2 DM:  Orginally diagnosed in 1995.  Current Regimen: Metformin 1000 mg twice a day, Lantus 30 Units/day, Humalog 7/5/7 Units with breakfast/lunch/dinner respectively. In addition to that he is on correctional scale 1 unit- 25 >140 but he is not using it.    Does not feel comfortable with carbohydrate counting.    BS checks: Three times a day    Average Meter Download: Blood sugar data reviewed.  In general blood  Refer ortho sugars are variable but mostly ranging in 150s-200s most of the time.  Occasional episodes of low blood sugar.  In the last few days 45 was the lowest number.  He was able to feel symptoms of hypoglycemia and was able to correct it.    Exercise: Minimal  Episodes of hypoglycemia (low blood sugar):  Yes. As noted above.  Fixed meal pattern: NO. Carb content varies. In general diet is high in carbs.  Patient counting carbs: No    DM Complications:   Nephropathy: Yes: Microalbuminuria present  Retinopathy: Yes: History of laser treatment in past  Neuropathy: Yes.  Microalbuminuria: Yes: Microalbuminuria present.  Not on ACE secondary to history of hyperkalemia.  MICROL      208   7/22/2016  MICROALBUMIN   390.98   7/22/2016    CAD/PAD: No  Gastroparesis: No  Hypoglycemia unawareness: Yes: Previous notes mentioning history of hypoglycemia unawareness.    2. Hypertension: Blood Pressure today:   BP Readings from Last 3 Encounters:   08/30/18 110/56   08/24/18 108/78   05/16/18 106/62     Blood pressure medications include hydrochlorothiazide 12.5 mg, amlodipine 5 mg.      3. Hyperlipidemia: Takes lovastatin 40 mg for lipid control.  Denies muscle aches of pains.        PMH/PSH:  Past Medical History:   Diagnosis Date     Calculus of ureter 1980's     Hypertension      Microalbuminuria 2/15/2016     Other and unspecified hyperlipidemia      Type 2 diabetes, HbA1C goal < 8% (H) 7/1995     Past Surgical History:   Procedure Laterality Date     C NONSPECIFIC PROCEDURE  1980's    Nasal septoplasty     HC COLONOSCOPY THRU STOMA, DIAGNOSTIC  6/12/2007    Normal     HC FLEX SIGMOIDOSCOPY W/WO MIGUEL SPEC BY BRUSH/WASH  12/30/1999    Normal to 60 cm     HC REPAIR INCISIONAL HERNIA,REDUCIBLE  1/2001, 1999    Hernia Repair, Incisional, Unilateral (right)     HC REPAIR INCISIONAL HERNIA,REDUCIBLE  1996    Hernia Repair, Incisional, Unilateral (left, with mesh placement)     Family Hx:  Family History   Problem Relation Age of Onset  "    Cerebrovascular Disease Mother       age 95     HEART DISEASE Mother      age 89,  age 95     Cerebrovascular Disease Father       age 91, stroke two years previous     Cancer - colorectal Brother      Half-brother     Cancer Sister      Half-sister (?type)     Cancer Sister      Half-sister (?type)     HEART DISEASE Brother      Neurologic Disorder Daughter      Multiple Sclerosis     Psychotic Disorder Son      Schizophrenia       Social Hx:  Social History     Social History     Marital status:      Spouse name: N/A     Number of children: 2     Years of education: N/A     Occupational History     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          MEDICATIONS:  has a current medication list which includes the following prescription(s): amlodipine, aspirin, atorvastatin, b-d u/f, blood glucose monitoring, blood glucose monitoring, finasteride, glucosamine hcl, insulin aspart, insulin glargine, insulin pen needle, insulin syringes (disposable), [employer] lancets, lisinopril, loratadine, metformin, multivitamin, glucose management, ACE/ARB NOT PRESCRIBED, INTENTIONAL,, order for dme, and tamsulosin.    ROS     ROS: 10 point ROS neg other than the symptoms noted above in the HPI.    Physical Exam   VS: /56 (BP Location: Right arm, Patient Position: Chair, Cuff Size: Adult Regular)  Pulse 77  Temp 97.9  F (36.6  C) (Oral)  Resp 20  Ht 1.67 m (5' 5.75\")  Wt 75.8 kg (167 lb)  SpO2 98%  BMI 27.16 kg/m2  GENERAL: AXOX3, NAD, well dressed,appears stated age.  HEENT: No exopthalmous, no proptosis, EOMI, no lig lag, no retraction  CV: RRR  LUNGS: " CTAB  ABDOMEN: +BS  NEUROLOGY: CN grossly intact, no tremors  PSYCH: normal affect and mood      LABS:  Component      Latest Ref Rng 4/15/2016   C-Peptide      0.9 - 6.9 ng/mL <0.1 (L)   Glutamic Acid Decarboxylase Antibody       <5.0 . . .     A1c:  Lab Results   Component Value Date    A1C 8.6 08/30/2018    A1C 8.4 03/07/2018    A1C 8.3 03/01/2018    A1C 7.9 01/09/2018    A1C 7.6 11/29/2017     Creatinine:  Creatinine   Date Value Ref Range Status   01/26/2018 1.25 0.66 - 1.25 mg/dL Final     Urine Micro:  Lab Results   Component Value Date    UMALCR 390.98 07/22/2016        LFTs/Lipids:  Recent Labs   Lab Test  08/16/17   0800  05/12/17   0906   09/25/15   0844  06/19/15   0815   CHOL  141  129   < >  134  135   HDL  37*  33*   < >  31*  36*   LDL  85  79   < >  80  83   TRIG  97  83   < >  117  78   CHOLHDLRATIO   --    --    --   4.3  3.8    < > = values in this interval not displayed.     TFTs:  TSH   Date Value Ref Range Status   07/22/2016 5.58 (H) 0.40 - 4.00 mU/L Final       All pertinent notes, labs, and images personally reviewed by me.     A/P  Mr.Victor ANURAG Sheldon is a 75 year old here for the evaluation/management of diabetes:    1. DM2 - (low C-peptide, negative FAVIOLA): Under Poor control.  A1c 8.6%.  Diabetes complicated by microalbuminuria, neuropathy and retinopathy.   Concerns for hypoglycemia unawareness. Variable BG. Brittle DM.  Multiple dose changes made in past but still not able to get stable blood sugar numbers.  Blood sugars are variable throughout the day.  Does not feel comfortable with carbohydrate counting.  Does not want Dexcom. This has been discussed multiple times with pt in past.  Again I discussed with patient and his wife that given his history of hypoglycemia unawareness and variable blood sugar he should strongly consider it.  H/o brittle DM. Lot of variability in BG.  Plan  In general diet is high in carbohydrates.  Continue Metformin 1000 mg twice a day   Decrease HUmalog:  take 6/5/6 Units with breakfast/lunch/dinner repectively.  Do not use correctional scale ( he is not using it)    If blood glucose is still dropping low then decrease novolog by 1 unit with each meal (5/4/5 Units with breakfast/lunch/dinner repectively )and decrease lantus to 28 Units.    I suspect that episode in clinic this AM was c/w hypoglycemic episode though we were not able to get BG on time. BG after a can of apple juice was 155.    Need to have consistent bedtime snack and consistent carbs with each meal.    Recommend checking blood sugars before meals and at bedtime.    If Blood glucose are low more often-> 2-3 times/week- give us a call.  The patient is advised to Make better food choices: reduce carbs, Reduce portion size, weight loss and exercise 3-4 times a week.  Discussed hypoglycemia signs and symptoms as well as management in detail.    2. Hypertension - Under Fair control.  Continue hydrochlorothiazide 12.5 mg, amlodipine 5 mg.    3. Hyperlipidemia - Under Good control.  Continue lovastatin 40 mg. LDL 91, HDL 34.    4. Prevention  Flu Shot-September 2015  Pneumovax- March 2012  Opthalmology-Yes. June 2017    ASA-no. 2/2 to h/o nosebleeds  Smoking- No    5. Microalbuminuria:  MICROL      208   7/22/2016  MICROALBUMIN   390.98   7/22/2016  Not on ACE 2/2 to h/o hyperkalemia    All questions were answered.  The patient indicates understanding of the above issues and agrees with the plan set forth.     More than 50% of face to face time spent with Mr. Sheldon on counseling / coordinating his care.  Total appointment times was 25 minutes.      Follow-up:  Follow up 3 months    Lori Damian M.D  Endocrinology  [address]/[place]    Disclaimer: This note consists of symbols derived from keyboarding, dictation and/or voice recognition software. As a result, there may be errors in the script that have gone undetected. Please consider this when interpreting information found in this  chart.        Again, thank you for allowing me to participate in the care of your patient.        Sincerely,        Lori Damian MD

## 2020-07-08 ENCOUNTER — TELEPHONE (OUTPATIENT)
Dept: NEUROLOGY | Facility: CLINIC | Age: 40
End: 2020-07-08

## 2020-07-10 ENCOUNTER — PATIENT MESSAGE (OUTPATIENT)
Dept: FAMILY MEDICINE CLINIC | Facility: CLINIC | Age: 40
End: 2020-07-10

## 2020-07-10 DIAGNOSIS — G56.22 NEURITIS OF LEFT ULNAR NERVE: ICD-10-CM

## 2020-07-10 DIAGNOSIS — M25.522 LEFT ELBOW PAIN: ICD-10-CM

## 2020-07-10 DIAGNOSIS — M25.521 RIGHT ELBOW PAIN: Primary | ICD-10-CM

## 2020-07-10 DIAGNOSIS — M79.641 RIGHT HAND PAIN: ICD-10-CM

## 2020-07-10 DIAGNOSIS — M79.644 PAIN OF RIGHT THUMB: ICD-10-CM

## 2020-07-10 NOTE — TELEPHONE ENCOUNTER
Ortho, can you verify as I don't find any progress notes from an office visit with your dept.  Pt was referred to Dr. Cade Dalton

## 2020-07-10 NOTE — TELEPHONE ENCOUNTER
From: Juan Steen  To:  Shara Meyers DO  Sent: 7/10/2020 3:15 PM CDT  Subject: Referral Request    Dear doctor Ana Sarkar I finally was able to call the hand specialist you referred me to, however the nurse suggested I needed a hand and elbow orthope

## 2020-07-10 NOTE — TELEPHONE ENCOUNTER
This pt has not been seen at the [facility]. They may have seen him at his private office, [facility]opedic [facility] [phone].

## 2020-07-15 NOTE — TELEPHONE ENCOUNTER
I am not sure what office recommended pt see an elbow specialist. No record of pt at [facility] in epic. Dr. Goldie Berry at [facility] is an upper extremity specialist and would be appropriate.

## 2020-07-15 NOTE — TELEPHONE ENCOUNTER
I sent her to Dr. Maryam Mcgee of [facility] who is an upper extremity specialist.  Please give her the number.

## 2020-07-16 NOTE — TELEPHONE ENCOUNTER
From  Pincus Aase, RN To  Medardomigel Munguia and Delivered  7/15/2020  6:38 PM   Last Read in MyChart  7/15/2020 10:04 PM by Pedro Christian

## 2020-07-30 DIAGNOSIS — E55.9 VITAMIN D DEFICIENCY: ICD-10-CM

## 2020-07-30 RX ORDER — BIOTIN 1 MG
TABLET ORAL
Qty: 100 CAPSULE | Refills: 1 | Status: SHIPPED | OUTPATIENT
Start: 2020-07-30 | End: 2020-12-07

## 2020-08-28 ENCOUNTER — NURSE TRIAGE (OUTPATIENT)
Dept: FAMILY MEDICINE CLINIC | Facility: CLINIC | Age: 40
End: 2020-08-28

## 2020-08-28 NOTE — TELEPHONE ENCOUNTER
Patient scheduled mychart    Appointment For: Priyank Agudelo ([id number])   Visit Type: [address] [place] [address] ([id number])      9/1/2020    11:30 AM  15 mins. XU Raines  [facility]      Patient Comments:   Excruciating pain in both elbows

## 2020-08-28 NOTE — TELEPHONE ENCOUNTER
Action Requested: Summary for Provider     []  Critical Lab, Recommendations Needed  [] Need Additional Advice  []   FYI    []   Need Orders  [] Need Medications Sent to Pharmacy  []  Other     SUMMARY:     Patient states she has been suffering from elbow

## 2020-08-29 ENCOUNTER — VIRTUAL PHONE E/M (OUTPATIENT)
Dept: FAMILY MEDICINE CLINIC | Facility: CLINIC | Age: 40
End: 2020-08-29
Payer: COMMERCIAL

## 2020-08-29 DIAGNOSIS — M25.521 BILATERAL ELBOW JOINT PAIN: Primary | ICD-10-CM

## 2020-08-29 DIAGNOSIS — M25.522 BILATERAL ELBOW JOINT PAIN: Primary | ICD-10-CM

## 2020-08-29 PROCEDURE — 99213 OFFICE O/P EST LOW 20 MIN: CPT | Performed by: PHYSICIAN ASSISTANT

## 2020-08-29 RX ORDER — LIDOCAINE 50 MG/G
1 PATCH TOPICAL EVERY 24 HOURS
Qty: 30 PATCH | Refills: 1 | Status: SHIPPED | OUTPATIENT
Start: 2020-08-29 | End: 2021-06-29

## 2020-08-29 NOTE — PROGRESS NOTES
Please note that the following visit was completed using two-way, real-time interactive audio and/or video communication.   This has been done in good jordyn to provide continuity of care in the best interest of the provider-patient relationship, due to the Ibuprofen. PATIENTS DENIES ANY KNOWN EXPOSURE TO ANYONE CONFIRMED FOR COVID 19. ROS    A comprehensive 10 point review of systems was completed. Pertinent positives and negatives noted in the the HPI.          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

## 2020-10-27 NOTE — TELEPHONE ENCOUNTER
Asked patient to call back to discuss PRP Payment plan     Please inform patient of the following payment plan       Three payments of $311  Two payments must be paid before actual PRP  Final payment [id number] due at check in for procedure. Detail Level: Generalized Tetracycline Counseling: Patient counseled regarding possible photosensitivity and increased risk for sunburn.  Patient instructed to avoid sunlight, if possible.  When exposed to sunlight, patients should wear protective clothing, sunglasses, and sunscreen.  The patient was instructed to call the office immediately if the following severe adverse effects occur:  hearing changes, easy bruising/bleeding, severe headache, or vision changes.  The patient verbalized understanding of the proper use and possible adverse effects of tetracycline.  All of the patient's questions and concerns were addressed. Patient understands to avoid pregnancy while on therapy due to potential birth defects. Doxycycline Counseling:  Patient counseled regarding possible photosensitivity and increased risk for sunburn.  Patient instructed to avoid sunlight, if possible.  When exposed to sunlight, patients should wear protective clothing, sunglasses, and sunscreen.  The patient was instructed to call the office immediately if the following severe adverse effects occur:  hearing changes, easy bruising/bleeding, severe headache, or vision changes.  The patient verbalized understanding of the proper use and possible adverse effects of doxycycline.  All of the patient's questions and concerns were addressed. Isotretinoin Pregnancy And Lactation Text: This medication is Pregnancy Category X and is considered extremely dangerous during pregnancy. It is unknown if it is excreted in breast milk. Topical Retinoid counseling:  Patient advised to apply a pea-sized amount only at bedtime and wait 30 minutes after washing their face before applying.  If too drying, patient may add a non-comedogenic moisturizer. The patient verbalized understanding of the proper use and possible adverse effects of retinoids.  All of the patient's questions and concerns were addressed. Minocycline Pregnancy And Lactation Text: This medication is Pregnancy Category D and not consider safe during pregnancy. It is also excreted in breast milk. Topical Clindamycin Pregnancy And Lactation Text: This medication is Pregnancy Category B and is considered safe during pregnancy. It is unknown if it is excreted in breast milk. Include Pregnancy/Lactation Warning?: No Bactrim Pregnancy And Lactation Text: This medication is Pregnancy Category D and is known to cause fetal risk.  It is also excreted in breast milk. Isotretinoin Counseling: Patient should get monthly blood tests, not donate blood, not drive at night if vision affected, not share medication, and not undergo elective surgery for 6 months after tx completed. Side effects reviewed, pt to contact office should one occur. Minocycline Counseling: Patient advised regarding possible photosensitivity and discoloration of the teeth, skin, lips, tongue and gums.  Patient instructed to avoid sunlight, if possible.  When exposed to sunlight, patients should wear protective clothing, sunglasses, and sunscreen.  The patient was instructed to call the office immediately if the following severe adverse effects occur:  hearing changes, easy bruising/bleeding, severe headache, or vision changes.  The patient verbalized understanding of the proper use and possible adverse effects of minocycline.  All of the patient's questions and concerns were addressed. Topical Clindamycin Counseling: Patient counseled that this medication may cause skin irritation or allergic reactions.  In the event of skin irritation, the patient was advised to reduce the amount of the drug applied or use it less frequently.   The patient verbalized understanding of the proper use and possible adverse effects of clindamycin.  All of the patient's questions and concerns were addressed. Spironolactone Pregnancy And Lactation Text: This medication can cause feminization of the male fetus and should be avoided during pregnancy. The active metabolite is also found in breast milk. Dapsone Pregnancy And Lactation Text: This medication is Pregnancy Category C and is not considered safe during pregnancy or breast feeding. Benzoyl Peroxide Pregnancy And Lactation Text: This medication is Pregnancy Category C. It is unknown if benzoyl peroxide is excreted in breast milk. Erythromycin Pregnancy And Lactation Text: This medication is Pregnancy Category B and is considered safe during pregnancy. It is also excreted in breast milk. Bactrim Counseling:  I discussed with the patient the risks of sulfa antibiotics including but not limited to GI upset, allergic reaction, drug rash, diarrhea, dizziness, photosensitivity, and yeast infections.  Rarely, more serious reactions can occur including but not limited to aplastic anemia, agranulocytosis, methemoglobinemia, blood dyscrasias, liver or kidney failure, lung infiltrates or desquamative/blistering drug rashes. High Dose Vitamin A Pregnancy And Lactation Text: High dose vitamin A therapy is contraindicated during pregnancy and breast feeding. Dapsone Counseling: I discussed with the patient the risks of dapsone including but not limited to hemolytic anemia, agranulocytosis, rashes, methemoglobinemia, kidney failure, peripheral neuropathy, headaches, GI upset, and liver toxicity.  Patients who start dapsone require monitoring including baseline LFTs and weekly CBCs for the first month, then every month thereafter.  The patient verbalized understanding of the proper use and possible adverse effects of dapsone.  All of the patient's questions and concerns were addressed. Spironolactone Counseling: Patient advised regarding risks of diarrhea, abdominal pain, hyperkalemia, birth defects (for female patients), liver toxicity and renal toxicity. The patient may need blood work to monitor liver and kidney function and potassium levels while on therapy. The patient verbalized understanding of the proper use and possible adverse effects of spironolactone.  All of the patient's questions and concerns were addressed. Benzoyl Peroxide Counseling: Patient counseled that medicine may cause skin irritation and bleach clothing.  In the event of skin irritation, the patient was advised to reduce the amount of the drug applied or use it less frequently.   The patient verbalized understanding of the proper use and possible adverse effects of benzoyl peroxide.  All of the patient's questions and concerns were addressed. Tazorac Pregnancy And Lactation Text: This medication is not safe during pregnancy. It is unknown if this medication is excreted in breast milk. Azithromycin Pregnancy And Lactation Text: This medication is considered safe during pregnancy and is also secreted in breast milk. Topical Sulfur Applications Pregnancy And Lactation Text: This medication is Pregnancy Category C and has an unknown safety profile during pregnancy. It is unknown if this topical medication is excreted in breast milk. Birth Control Pills Pregnancy And Lactation Text: This medication should be avoided if pregnant and for the first 30 days post-partum. Erythromycin Counseling:  I discussed with the patient the risks of erythromycin including but not limited to GI upset, allergic reaction, drug rash, diarrhea, increase in liver enzymes, and yeast infections. Tazorac Counseling:  Patient advised that medication is irritating and drying.  Patient may need to apply sparingly and wash off after an hour before eventually leaving it on overnight.  The patient verbalized understanding of the proper use and possible adverse effects of tazorac.  All of the patient's questions and concerns were addressed. High Dose Vitamin A Counseling: Side effects reviewed, pt to contact office should one occur. Azithromycin Counseling:  I discussed with the patient the risks of azithromycin including but not limited to GI upset, allergic reaction, drug rash, diarrhea, and yeast infections. Topical Sulfur Applications Counseling: Topical Sulfur Counseling: Patient counseled that this medication may cause skin irritation or allergic reactions.  In the event of skin irritation, the patient was advised to reduce the amount of the drug applied or use it less frequently.   The patient verbalized understanding of the proper use and possible adverse effects of topical sulfur application.  All of the patient's questions and concerns were addressed. Doxycycline Pregnancy And Lactation Text: This medication is Pregnancy Category D and not consider safe during pregnancy. It is also excreted in breast milk but is considered safe for shorter treatment courses. Topical Retinoid Pregnancy And Lactation Text: This medication is Pregnancy Category C. It is unknown if this medication is excreted in breast milk. Detail Level: Simple Sarecycline Counseling: Patient advised regarding possible photosensitivity and discoloration of the teeth, skin, lips, tongue and gums.  Patient instructed to avoid sunlight, if possible.  When exposed to sunlight, patients should wear protective clothing, sunglasses, and sunscreen.  The patient was instructed to call the office immediately if the following severe adverse effects occur:  hearing changes, easy bruising/bleeding, severe headache, or vision changes.  The patient verbalized understanding of the proper use and possible adverse effects of sarecycline.  All of the patient's questions and concerns were addressed. Birth Control Pills Counseling: Birth Control Pill Counseling: I discussed with the patient the potential side effects of OCPs including but not limited to increased risk of stroke, heart attack, thrombophlebitis, deep venous thrombosis, hepatic adenomas, breast changes, GI upset, headaches, and depression.  The patient verbalized understanding of the proper use and possible adverse effects of OCPs. All of the patient's questions and concerns were addressed.

## 2020-12-07 DIAGNOSIS — E55.9 VITAMIN D DEFICIENCY: ICD-10-CM

## 2020-12-08 RX ORDER — BIOTIN 1 MG
1 TABLET ORAL DAILY
Qty: 100 CAPSULE | Refills: 1 | Status: SHIPPED | OUTPATIENT
Start: 2020-12-08 | End: 2021-01-08

## 2020-12-08 NOTE — TELEPHONE ENCOUNTER
•  VITAMIN D3 25 MCG (1000 UT) Oral Cap, TAKE ONE CAPSULE BY MOUTH DAILY, Disp: 100 capsule, Rfl: 1
no

## 2021-01-08 ENCOUNTER — HOSPITAL ENCOUNTER (OUTPATIENT)
Age: 41
Discharge: HOME | End: 2021-01-08
Payer: MEDICAID

## 2021-01-08 VITALS
OXYGEN SATURATION: 100 % | RESPIRATION RATE: 18 BRPM | HEART RATE: 86 BPM | SYSTOLIC BLOOD PRESSURE: 137 MMHG | TEMPERATURE: 98 F | HEIGHT: 64 IN | DIASTOLIC BLOOD PRESSURE: 85 MMHG | BODY MASS INDEX: 28 KG/M2

## 2021-01-08 DIAGNOSIS — M79.644 PAIN OF RIGHT THUMB: ICD-10-CM

## 2021-01-08 DIAGNOSIS — G89.29 CHRONIC ELBOW PAIN, LEFT: ICD-10-CM

## 2021-01-08 DIAGNOSIS — G89.29 CHRONIC PAIN OF LEFT UPPER EXTREMITY: Primary | ICD-10-CM

## 2021-01-08 DIAGNOSIS — M79.602 CHRONIC PAIN OF LEFT UPPER EXTREMITY: Primary | ICD-10-CM

## 2021-01-08 DIAGNOSIS — M79.641 RIGHT HAND PAIN: ICD-10-CM

## 2021-01-08 DIAGNOSIS — E55.9 VITAMIN D DEFICIENCY: ICD-10-CM

## 2021-01-08 DIAGNOSIS — G56.03 BILATERAL CARPAL TUNNEL SYNDROME: ICD-10-CM

## 2021-01-08 DIAGNOSIS — G56.22 NEURITIS OF LEFT ULNAR NERVE: ICD-10-CM

## 2021-01-08 DIAGNOSIS — M25.522 CHRONIC ELBOW PAIN, LEFT: ICD-10-CM

## 2021-01-08 PROCEDURE — 99203 OFFICE O/P NEW LOW 30 MIN: CPT | Performed by: PHYSICIAN ASSISTANT

## 2021-01-09 NOTE — ED PROVIDER NOTES
Patient Seen in: [facility]      History   Patient presents with:  Arm Pain    Stated Complaint: Arm pain    HPI/Subjective:   HPI    40 yo female here for evaluation of L arm pain.   Pt states she has had this pain for approx 1 year- dx with Constitutional:       General: She is not in acute distress. HENT:      Head: Normocephalic and atraumatic.       Right Ear: External ear normal.      Left Ear: External ear normal.      Nose: Nose normal.      Mouth/Throat:      Mouth: Mucous membranes [id number]  [phone]                Medications Prescribed:  Current Discharge Medication List

## 2021-01-09 NOTE — ED INITIAL ASSESSMENT (HPI)
Bilateral arm pain for a year. Pt states today the pain has been worse. Pt was having physical therapy, but cancelled due to covid.

## 2021-01-10 RX ORDER — BIOTIN 1 MG
1 TABLET ORAL DAILY
Qty: 100 CAPSULE | Refills: 1 | Status: SHIPPED | OUTPATIENT
Start: 2021-01-10 | End: 2021-05-14

## 2021-01-13 DIAGNOSIS — G89.29 CHRONIC ELBOW PAIN, LEFT: ICD-10-CM

## 2021-01-13 DIAGNOSIS — G56.03 BILATERAL CARPAL TUNNEL SYNDROME: ICD-10-CM

## 2021-01-13 DIAGNOSIS — G56.22 NEURITIS OF LEFT ULNAR NERVE: ICD-10-CM

## 2021-01-13 DIAGNOSIS — M79.641 RIGHT HAND PAIN: ICD-10-CM

## 2021-01-13 DIAGNOSIS — M79.644 PAIN OF RIGHT THUMB: ICD-10-CM

## 2021-01-13 DIAGNOSIS — M25.522 CHRONIC ELBOW PAIN, LEFT: ICD-10-CM

## 2021-01-14 ENCOUNTER — TELEMEDICINE (OUTPATIENT)
Dept: FAMILY MEDICINE CLINIC | Facility: CLINIC | Age: 41
End: 2021-01-14
Payer: MEDICAID

## 2021-01-14 DIAGNOSIS — H00.014 HORDEOLUM EXTERNUM OF LEFT UPPER EYELID: Primary | ICD-10-CM

## 2021-01-14 PROBLEM — Z01.818 PREOP EXAMINATION: Status: RESOLVED | Noted: 2020-01-15 | Resolved: 2021-01-14

## 2021-01-14 PROBLEM — Z12.31 SCREENING MAMMOGRAM, ENCOUNTER FOR: Status: RESOLVED | Noted: 2020-06-08 | Resolved: 2021-01-14

## 2021-01-14 PROBLEM — Z00.00 WELL ADULT EXAM: Status: RESOLVED | Noted: 2020-06-08 | Resolved: 2021-01-14

## 2021-01-14 PROCEDURE — 99213 OFFICE O/P EST LOW 20 MIN: CPT | Performed by: PHYSICIAN ASSISTANT

## 2021-01-14 RX ORDER — ERYTHROMYCIN 5 MG/G
OINTMENT OPHTHALMIC
Qty: 1 TUBE | Refills: 0 | Status: SHIPPED | OUTPATIENT
Start: 2021-01-14 | End: 2021-10-19

## 2021-01-14 NOTE — PROGRESS NOTES
Please note that the following visit was completed using two-way, real-time interactive audio and/or video communication.   This has been done in good jordyn to provide continuity of care in the best interest of the provider-patient relationship, due to the Oral Tab Take 1 tablet (50 mg total) by mouth every 8 (eight) hours as needed for Pain. 90 tablet 0   • pregabalin (LYRICA) 75 MG Oral Cap Take 1 capsule (75 mg total) by mouth 2 (two) times daily.  Was already on gabapentin in the past and continues to hav with patient, advised the following:  -Start erythromycin ointment  -Educated pt on how to take the medication   -Continue with warm compresses  -To call or follow-up with worsening symptoms or concerns.   -Pt was agreeable to plan and will comply with dis

## 2021-01-16 RX ORDER — PREGABALIN 75 MG/1
75 CAPSULE ORAL 2 TIMES DAILY
Qty: 60 CAPSULE | Refills: 0 | Status: SHIPPED | OUTPATIENT
Start: 2021-01-16 | End: 2021-06-29

## 2021-02-17 RX ORDER — LIDOCAINE 50 MG/G
1 PATCH TOPICAL EVERY 24 HOURS
Qty: 30 PATCH | Refills: 1 | OUTPATIENT
Start: 2021-02-17

## 2021-05-13 ENCOUNTER — TELEPHONE (OUTPATIENT)
Dept: NEUROLOGY | Facility: CLINIC | Age: 41
End: 2021-05-13

## 2021-05-13 NOTE — TELEPHONE ENCOUNTER
Patient must have an appointment before scheduling any injections.   Last appointment was a virtual in 5/6/20- in office 3/6/20

## 2021-05-13 NOTE — TELEPHONE ENCOUNTER
Pt stopped in the office, pt states she missed the call from the nurse and wanted to make sure she is given a call to schedule the injections

## 2021-05-14 ENCOUNTER — OFFICE VISIT (OUTPATIENT)
Dept: FAMILY MEDICINE CLINIC | Facility: CLINIC | Age: 41
End: 2021-05-14
Payer: OTHER MISCELLANEOUS

## 2021-05-14 ENCOUNTER — PATIENT MESSAGE (OUTPATIENT)
Dept: FAMILY MEDICINE CLINIC | Facility: CLINIC | Age: 41
End: 2021-05-14

## 2021-05-14 VITALS
SYSTOLIC BLOOD PRESSURE: 137 MMHG | DIASTOLIC BLOOD PRESSURE: 97 MMHG | TEMPERATURE: 98 F | BODY MASS INDEX: 28.99 KG/M2 | HEART RATE: 89 BPM | HEIGHT: 64 IN | WEIGHT: 169.81 LBS

## 2021-05-14 DIAGNOSIS — G56.01 CARPAL TUNNEL SYNDROME OF RIGHT WRIST: Primary | ICD-10-CM

## 2021-05-14 DIAGNOSIS — M77.11 LATERAL EPICONDYLITIS OF RIGHT ELBOW: ICD-10-CM

## 2021-05-14 DIAGNOSIS — E55.9 VITAMIN D DEFICIENCY: ICD-10-CM

## 2021-05-14 PROCEDURE — 3008F BODY MASS INDEX DOCD: CPT | Performed by: FAMILY MEDICINE

## 2021-05-14 PROCEDURE — 99214 OFFICE O/P EST MOD 30 MIN: CPT | Performed by: FAMILY MEDICINE

## 2021-05-14 PROCEDURE — 3075F SYST BP GE 130 - 139MM HG: CPT | Performed by: FAMILY MEDICINE

## 2021-05-14 PROCEDURE — 3080F DIAST BP >= 90 MM HG: CPT | Performed by: FAMILY MEDICINE

## 2021-05-14 RX ORDER — BIOTIN 1 MG
1 TABLET ORAL DAILY
Qty: 100 CAPSULE | Refills: 1 | Status: SHIPPED | OUTPATIENT
Start: 2021-05-14

## 2021-05-14 NOTE — PROGRESS NOTES
Patient ID: Betty Puga is a 35year old female. HPI  Patient presents with: Follow - Up: Carpal tunnel  Forms Completion    Last seen by me on 3/16/2020. Pt is approved for Medicaid. She was approved for long-term disability insurance.  He shortness of breath. Cardiovascular: Negative for chest pain. Musculoskeletal: Positive for arthralgias.            Medical History:      Past Medical History:   Diagnosis Date   • Carpal tunnel syndrome, right 11/25/2019   • High cholesterol    • Hist Normocephalic. Eyes: Conjunctivae and EOM are normal.   Neurological: Patient is alert and oriented to person, place, and time. Skin: Skin is warm. Psychiatry: Normal mood and affect. Right wrist: Tender over median nerve.  Tingling into her thumb wi DO Logan, 5/14/2021, 11:04 AM

## 2021-05-15 NOTE — TELEPHONE ENCOUNTER
From: Jesus Giron  To: XU Carson  Sent: 5/14/2021 7:20 PM CDT  Subject: Referral Request    I need a hard copy of referral# [id number] & [id number], please fax it over to [phone]

## 2021-05-18 ENCOUNTER — TELEPHONE (OUTPATIENT)
Dept: NEUROLOGY | Facility: CLINIC | Age: 41
End: 2021-05-18

## 2021-05-19 NOTE — TELEPHONE ENCOUNTER
Informed patient that the appointment date and time is updated in [facility] to June 16th. Patient stated \"Okay\" then hung up quickly.

## 2021-05-26 ENCOUNTER — PATIENT MESSAGE (OUTPATIENT)
Dept: FAMILY MEDICINE CLINIC | Facility: CLINIC | Age: 41
End: 2021-05-26

## 2021-05-30 NOTE — TELEPHONE ENCOUNTER
Odersun message sent. From: Chuck Campbell  To: XU Santana  Sent: 5/26/2021  1:42 PM CDT  Subject: Other    Please provide an Authorization to use and disclose health information, if possible email it to:   [email or web address]

## 2021-06-08 ENCOUNTER — TELEPHONE (OUTPATIENT)
Dept: FAMILY MEDICINE CLINIC | Facility: CLINIC | Age: 41
End: 2021-06-08

## 2021-06-08 NOTE — TELEPHONE ENCOUNTER
Spoke w/ pt, pt states this [employer] is for forms previously completed. Pt was told a hard copy was needed.  Pt does need disab/fmla forms completed

## 2021-06-08 NOTE — TELEPHONE ENCOUNTER
Pt dropped off signed Dorlene Pitcher form related to her disability forms. Pt states she already turned in disability forms and the only thing left was to sign HIPPA form. Don't see an encounter stating disability forms were received. Fee was not paid.     Hippa form emailed to the forms dept

## 2021-06-09 ENCOUNTER — MED REC SCAN ONLY (OUTPATIENT)
Dept: ADMINISTRATIVE | Age: 41
End: 2021-06-09

## 2021-06-29 ENCOUNTER — OFFICE VISIT (OUTPATIENT)
Dept: FAMILY MEDICINE CLINIC | Facility: CLINIC | Age: 41
End: 2021-06-29
Payer: OTHER MISCELLANEOUS

## 2021-06-29 VITALS
BODY MASS INDEX: 28.51 KG/M2 | WEIGHT: 167 LBS | HEIGHT: 64 IN | SYSTOLIC BLOOD PRESSURE: 120 MMHG | HEART RATE: 80 BPM | DIASTOLIC BLOOD PRESSURE: 76 MMHG

## 2021-06-29 DIAGNOSIS — M77.11 LATERAL EPICONDYLITIS OF BOTH ELBOWS: ICD-10-CM

## 2021-06-29 DIAGNOSIS — R20.2 NUMBNESS AND TINGLING IN RIGHT HAND: Primary | ICD-10-CM

## 2021-06-29 DIAGNOSIS — Z88.6 ALLERGY TO NSAIDS: ICD-10-CM

## 2021-06-29 DIAGNOSIS — M79.644 PAIN OF RIGHT THUMB: ICD-10-CM

## 2021-06-29 DIAGNOSIS — M77.12 LATERAL EPICONDYLITIS OF BOTH ELBOWS: ICD-10-CM

## 2021-06-29 DIAGNOSIS — G89.29 CHRONIC ELBOW PAIN, LEFT: ICD-10-CM

## 2021-06-29 DIAGNOSIS — G56.03 BILATERAL CARPAL TUNNEL SYNDROME: ICD-10-CM

## 2021-06-29 DIAGNOSIS — M79.641 RIGHT HAND PAIN: ICD-10-CM

## 2021-06-29 DIAGNOSIS — R20.0 NUMBNESS AND TINGLING IN RIGHT HAND: Primary | ICD-10-CM

## 2021-06-29 DIAGNOSIS — M77.02 MEDIAL EPICONDYLITIS OF ELBOW, LEFT: ICD-10-CM

## 2021-06-29 DIAGNOSIS — M25.522 CHRONIC ELBOW PAIN, LEFT: ICD-10-CM

## 2021-06-29 DIAGNOSIS — G56.22 NEURITIS OF LEFT ULNAR NERVE: ICD-10-CM

## 2021-06-29 DIAGNOSIS — M77.11 LATERAL EPICONDYLITIS OF RIGHT ELBOW: ICD-10-CM

## 2021-06-29 PROCEDURE — 99214 OFFICE O/P EST MOD 30 MIN: CPT | Performed by: NURSE PRACTITIONER

## 2021-06-29 PROCEDURE — 3008F BODY MASS INDEX DOCD: CPT | Performed by: NURSE PRACTITIONER

## 2021-06-29 PROCEDURE — 3078F DIAST BP <80 MM HG: CPT | Performed by: NURSE PRACTITIONER

## 2021-06-29 PROCEDURE — 3074F SYST BP LT 130 MM HG: CPT | Performed by: NURSE PRACTITIONER

## 2021-06-29 RX ORDER — LIDOCAINE 50 MG/G
1 PATCH TOPICAL EVERY 24 HOURS
Qty: 30 PATCH | Refills: 1 | Status: SHIPPED | OUTPATIENT
Start: 2021-06-29 | End: 2021-12-21

## 2021-06-29 RX ORDER — PREGABALIN 75 MG/1
75 CAPSULE ORAL 2 TIMES DAILY
Qty: 60 CAPSULE | Refills: 2 | Status: SHIPPED | OUTPATIENT
Start: 2021-06-29

## 2021-06-29 RX ORDER — TRAMADOL HYDROCHLORIDE 50 MG/1
50 TABLET ORAL EVERY 8 HOURS PRN
Qty: 90 TABLET | Refills: 0 | Status: SHIPPED | OUTPATIENT
Start: 2021-06-29

## 2021-06-29 NOTE — PROGRESS NOTES
HPI  Pt here for left wrist and elbow pain that is now radiating up to shoulder. Has burning pain up to shoulder and neck. Has a hard time sleeping. Has follow up appointment w Dr Stephan Saldaña on 7/20 and is scheduled for PRP injections in August for right arm. History      Marital status:        Spouse name: Not on file      Number of children: Not on file      Years of education: Not on file      Highest education level: Not on file    Occupational History      Not on file    Tobacco Use      Smoking stat Frequency of Communication with Friends and Family:       Frequency of Social Gatherings with Friends and Family:       Attends Mandaen Services:       Active Member of Clubs or Organizations:       Attends Club or Organization Meetings:       Marital St alert.         Assessment and Plan:  Problem List Items Addressed This Visit        Neurologic    Numbness and tingling in right hand - Primary    Bilateral carpal tunnel syndrome    Relevant Medications    pregabalin (LYRICA) 75 MG Oral Cap    traMADol HC

## 2021-07-01 NOTE — PROGRESS NOTES
Pt request for surgery signed by pt and witnessed and signed by RN. Prescription for Norco and narcotic prescription electronically sent to pharmacy per Dr. Teresa Richardson order and pt instructed to  prescription before surgery.    Pre-Surgical Instru No

## 2021-07-04 NOTE — TELEPHONE ENCOUNTER
FMLA forms brought for completion   MAAME or Fee was not collected.
Faxed PHI with blank medicaid bartolome to [address] Notified pt to . (Only notes faxed, didn't fill out form - n/c).
HIPAA valid 7/2020. Bill patient.  SC
No

## 2021-07-22 ENCOUNTER — TELEPHONE (OUTPATIENT)
Dept: NEUROLOGY | Facility: CLINIC | Age: 41
End: 2021-07-22

## 2021-10-19 ENCOUNTER — TELEPHONE (OUTPATIENT)
Dept: FAMILY MEDICINE CLINIC | Facility: CLINIC | Age: 41
End: 2021-10-19

## 2021-10-19 ENCOUNTER — OFFICE VISIT (OUTPATIENT)
Dept: FAMILY MEDICINE CLINIC | Facility: CLINIC | Age: 41
End: 2021-10-19
Payer: OTHER MISCELLANEOUS

## 2021-10-19 VITALS
HEART RATE: 97 BPM | DIASTOLIC BLOOD PRESSURE: 84 MMHG | BODY MASS INDEX: 28.68 KG/M2 | SYSTOLIC BLOOD PRESSURE: 124 MMHG | WEIGHT: 168 LBS | HEIGHT: 64 IN

## 2021-10-19 DIAGNOSIS — G56.03 BILATERAL CARPAL TUNNEL SYNDROME: Primary | ICD-10-CM

## 2021-10-19 PROCEDURE — 3079F DIAST BP 80-89 MM HG: CPT | Performed by: NURSE PRACTITIONER

## 2021-10-19 PROCEDURE — 3074F SYST BP LT 130 MM HG: CPT | Performed by: NURSE PRACTITIONER

## 2021-10-19 PROCEDURE — 3008F BODY MASS INDEX DOCD: CPT | Performed by: NURSE PRACTITIONER

## 2021-10-19 PROCEDURE — 99214 OFFICE O/P EST MOD 30 MIN: CPT | Performed by: NURSE PRACTITIONER

## 2021-10-19 RX ORDER — ACETAMINOPHEN AND CODEINE PHOSPHATE 300; 30 MG/1; MG/1
1 TABLET ORAL EVERY 6 HOURS PRN
Qty: 30 TABLET | Refills: 0 | Status: SHIPPED | OUTPATIENT
Start: 2021-10-19 | End: 2021-11-30

## 2021-10-19 NOTE — TELEPHONE ENCOUNTER
Patient is calling regarding referral for physical therapy issued 10/19/21 (#[id number]). Patient states it needs to be changed from physical therapy to occupational therapy and include the elbow / bilateral. Patient visited the site and was informed it needed to be changed.     Please call patient to confirm amended referral.    Please fax new referral change updated to  [phone]

## 2021-10-19 NOTE — PROGRESS NOTES
HPI  Pt presents for follow up wrist pain. Pain worse on left 4th and 5th fingers. Has had right wrist sx    Has seen Dr Linda Gore new referral for ortho as Dr Tatyana Cruz does not do elbow sx.      Would like to continue physical therapy-they h education: Not on file      Highest education level: Not on file    Occupational History      Not on file    Tobacco Use      Smoking status: Never Smoker      Smokeless tobacco: Never Used    Vaping Use      Vaping Use: Never used    Substance and Sexual Friends and Family: Not on file      Frequency of Social Gatherings with Friends and Family: Not on file      Attends Alevism Services: Not on file      Active Member of Clubs or Organizations: Not on file      Attends Club or Organization Meetings: Not Right elbow: Decreased range of motion. Tenderness present. Right wrist: Tenderness present. Decreased range of motion. Left wrist: Tenderness present. Decreased range of motion. Arms:    Neurological:      Mental Status: She is alert.

## 2021-10-21 NOTE — ASSESSMENT & PLAN NOTE
Pt has allergy to ibuprofen-can't take any nsaids  t #3 I po qid prn pain  Refer PT and for wrist splints  Has ortho appointment

## 2021-10-25 NOTE — TELEPHONE ENCOUNTER
[address] therapist is calling regarding referral. Wants to know if we can make the change and fax over. Referral is for occupational therapy not physical therapy. Also wants to confirm if she is suppose to be treating only the carpale tunnel or is there something else. Please update referral with info. Call with any questions.      Fax # [phone]  Phone # [phone]

## 2021-11-05 ENCOUNTER — OFFICE VISIT (OUTPATIENT)
Dept: ORTHOPEDICS CLINIC | Facility: CLINIC | Age: 41
End: 2021-11-05
Payer: OTHER MISCELLANEOUS

## 2021-11-05 ENCOUNTER — HOSPITAL ENCOUNTER (OUTPATIENT)
Dept: GENERAL RADIOLOGY | Facility: HOSPITAL | Age: 41
Discharge: HOME | End: 2021-11-05
Attending: ORTHOPAEDIC SURGERY
Payer: MEDICAID

## 2021-11-05 DIAGNOSIS — M25.521 PAIN OF BOTH ELBOWS: ICD-10-CM

## 2021-11-05 DIAGNOSIS — M25.522 PAIN OF BOTH ELBOWS: ICD-10-CM

## 2021-11-05 DIAGNOSIS — G56.22 CUBITAL TUNNEL SYNDROME ON LEFT: ICD-10-CM

## 2021-11-05 DIAGNOSIS — M77.02 MEDIAL EPICONDYLITIS OF LEFT ELBOW: ICD-10-CM

## 2021-11-05 DIAGNOSIS — G56.01 CARPAL TUNNEL SYNDROME OF RIGHT WRIST: ICD-10-CM

## 2021-11-05 DIAGNOSIS — M77.11 LATERAL EPICONDYLITIS OF RIGHT ELBOW: Primary | ICD-10-CM

## 2021-11-05 PROCEDURE — 99244 OFF/OP CNSLTJ NEW/EST MOD 40: CPT | Performed by: ORTHOPAEDIC SURGERY

## 2021-11-05 PROCEDURE — 73080 X-RAY EXAM OF ELBOW: CPT | Performed by: ORTHOPAEDIC SURGERY

## 2021-11-05 NOTE — PROGRESS NOTES
NURSING INTAKE COMMENTS: Patient presents with:  Elbow Pain: Bilateral, Pain scale 8/10 mostly on the Right. HPI: This 35year old female presents today with complaints of bilateral elbow and upper extremity pain.   She has had symptoms for more than Cholecalciferol (VITAMIN D3) 25 MCG (1000 UT) Oral Cap Take 1 capsule by mouth daily. 100 capsule 1   • atorvastatin 10 MG Oral Tab Take 1 tablet (10 mg total) by mouth nightly.  90 tablet 3       Ibuprofen               SWELLING, Tightness in Throat  Famil seasonal allergies    Physical Examination:    LMP 10/19/2021 (Approximate)   Constitutional: appears well hydrated, alert and responsive, no acute distress noted  Extremities: Right elbow tender over the lateral epicondyle.   There is pain in the lateral b abnormality or significant arthropathy of the left elbow.     Dictated by (CST): Juan Gaming MD on 11/05/2021 at 10:14 AM     Finalized by (CST): Juan Gaming MD on 11/05/2021 at 10:15 AM          XR ELBOW, COMPLETE (MIN 3 VIEWS), RIGHT (CPT=73080) Future  -     EMG; Future    Carpal tunnel syndrome of right wrist  -     Cancel: EMG; Future  -     EMG; Future    Medial epicondylitis of left elbow        Assessment: Right elbow lateral epicondylitis, persistent carpal tunnel syndrome.   Left elbow cubi

## 2021-11-18 NOTE — TELEPHONE ENCOUNTER
Medication request: Gabapentin 100 MG Take 1 Capsule by mouth nightly   LOV:05/16/19  NOV: 06/18/19    Last refill: 05/16/19 Report given to KEILA Beaver on 2E. Pt is ready for transport.

## 2021-11-30 ENCOUNTER — OFFICE VISIT (OUTPATIENT)
Dept: FAMILY MEDICINE CLINIC | Facility: CLINIC | Age: 41
End: 2021-11-30
Payer: OTHER MISCELLANEOUS

## 2021-11-30 VITALS
HEIGHT: 64 IN | SYSTOLIC BLOOD PRESSURE: 137 MMHG | BODY MASS INDEX: 27.31 KG/M2 | DIASTOLIC BLOOD PRESSURE: 85 MMHG | WEIGHT: 160 LBS | HEART RATE: 88 BPM

## 2021-11-30 DIAGNOSIS — R20.2 NUMBNESS AND TINGLING IN RIGHT HAND: ICD-10-CM

## 2021-11-30 DIAGNOSIS — M67.921 TENDINOPATHY OF RIGHT ELBOW: ICD-10-CM

## 2021-11-30 DIAGNOSIS — Z98.890 HISTORY OF CARPAL TUNNEL RELEASE: ICD-10-CM

## 2021-11-30 DIAGNOSIS — M25.521 RIGHT ELBOW PAIN: ICD-10-CM

## 2021-11-30 DIAGNOSIS — G56.03 BILATERAL CARPAL TUNNEL SYNDROME: ICD-10-CM

## 2021-11-30 DIAGNOSIS — R20.2 NUMBNESS AND TINGLING IN LEFT HAND: ICD-10-CM

## 2021-11-30 DIAGNOSIS — M77.02 MEDIAL EPICONDYLITIS OF ELBOW, LEFT: ICD-10-CM

## 2021-11-30 DIAGNOSIS — R20.0 NUMBNESS AND TINGLING IN RIGHT HAND: ICD-10-CM

## 2021-11-30 DIAGNOSIS — M77.11 LATERAL EPICONDYLITIS OF RIGHT ELBOW: Primary | ICD-10-CM

## 2021-11-30 DIAGNOSIS — M25.522 LEFT ELBOW PAIN: ICD-10-CM

## 2021-11-30 DIAGNOSIS — R20.0 NUMBNESS AND TINGLING IN LEFT HAND: ICD-10-CM

## 2021-11-30 PROCEDURE — 99214 OFFICE O/P EST MOD 30 MIN: CPT | Performed by: NURSE PRACTITIONER

## 2021-11-30 PROCEDURE — 3079F DIAST BP 80-89 MM HG: CPT | Performed by: NURSE PRACTITIONER

## 2021-11-30 PROCEDURE — 3075F SYST BP GE 130 - 139MM HG: CPT | Performed by: NURSE PRACTITIONER

## 2021-11-30 PROCEDURE — 3008F BODY MASS INDEX DOCD: CPT | Performed by: NURSE PRACTITIONER

## 2021-11-30 RX ORDER — ACETAMINOPHEN AND CODEINE PHOSPHATE 300; 30 MG/1; MG/1
1 TABLET ORAL EVERY 6 HOURS PRN
Qty: 30 TABLET | Refills: 1 | Status: SHIPPED | OUTPATIENT
Start: 2021-11-30 | End: 2022-01-21

## 2021-12-01 NOTE — PROGRESS NOTES
HPI  Pt here for follow up on bilat carpal tunnel. Saw Dr Felix Marroquin a couple of weeks ago. Will likely need surgery to release nerves. Still having a lot of pain, numbness and tingling to hands. Decreased rom to wrists; has pain in forearms and elbows. Years of education: Not on file      Highest education level: Not on file    Occupational History      Not on file    Tobacco Use      Smoking status: Never Smoker      Smokeless tobacco: Never Used    Vaping Use      Vaping Use: Never used    Substance an pregabalin (LYRICA) 75 MG Oral Cap Take 1 capsule (75 mg total) by mouth 2 (two) times daily. Was already on gabapentin in the past and continues to have pain. She has carpal tunnel pain.  60 capsule 2   • traMADol HCl 50 MG Oral Tab Take 1 tablet (50 mg t Medial epicondylitis of elbow, left    Relevant Medications    Acetaminophen-Codeine (TYLENOL WITH CODEINE #3) 300-30 MG Oral Tab    Other Relevant Orders    PHYSICAL THERAPY EXTERNAL    Left elbow pain    Relevant Orders    PHYSICAL THERAPY EXTERNAL    Ri

## 2021-12-17 ENCOUNTER — PROCEDURE VISIT (OUTPATIENT)
Dept: PHYSICAL MEDICINE AND REHAB | Facility: CLINIC | Age: 41
End: 2021-12-17

## 2021-12-17 ENCOUNTER — TELEPHONE (OUTPATIENT)
Dept: NEUROLOGY | Facility: CLINIC | Age: 41
End: 2021-12-17

## 2021-12-17 DIAGNOSIS — R20.0 NUMBNESS IN BOTH HANDS: Primary | ICD-10-CM

## 2021-12-17 PROCEDURE — 95886 MUSC TEST DONE W/N TEST COMP: CPT | Performed by: PHYSICAL MEDICINE & REHABILITATION

## 2021-12-17 PROCEDURE — 95913 NRV CNDJ TEST 13/> STUDIES: CPT | Performed by: PHYSICAL MEDICINE & REHABILITATION

## 2021-12-17 NOTE — PROCEDURES
[address]   Nerve Conduction Study and Electromyography Procedure Note      Patient: Sonja Hart  Patient ID: [id number]  Sex: Female  YOB: 1980  Age: 35 Years 8 Months  Complaint: EMG 48.0      Ref. Ref. 4.20 5.0          B. Elbow 18 B. Elbow - Wrist 5.55 11.8 69.2 89.8 6.30 45.2      Ref. Ref. 50.0         A. Elbow 9.5 A. Elbow - B. Elbow 7.00 12.1 65.5 92 6.60 46.6   L ULNAR - ADM      Wrist 8 Wrist - ADM 3.00 12.8  100 6.40 40.3 study to Digit V demonstrates a normal latency and amplitude. • BILATERAL dorsal ulnar cutaneous nerve conduction study demonstrates a normal latency and amplitude.       Motor Nerve Conduction Studies   • RIGHT median nerve conduction study to the APB dem

## 2021-12-21 NOTE — TELEPHONE ENCOUNTER
Current Outpatient Medications:   •  lidocaine 5 % External Patch, Place 1 patch onto the skin daily. , Disp: 30 patch, Rfl: 1

## 2021-12-21 NOTE — TELEPHONE ENCOUNTER
Refill passed per [facility] protocol, but last ordered 6/29/2021 for #30 + 1    Please send, if appropriate      Requested Prescriptions   Pending Prescriptions Disp Refills    lidocaine 5 % External Patch 30 patch 1     Sig: Place 1 patch onto the s

## 2021-12-22 RX ORDER — LIDOCAINE 50 MG/G
1 PATCH TOPICAL EVERY 24 HOURS
Qty: 30 PATCH | Refills: 1 | Status: SHIPPED | OUTPATIENT
Start: 2021-12-22 | End: 2022-01-21

## 2022-01-10 ENCOUNTER — TELEPHONE (OUTPATIENT)
Dept: FAMILY MEDICINE CLINIC | Facility: CLINIC | Age: 42
End: 2022-01-10

## 2022-01-10 ENCOUNTER — TELEPHONE (OUTPATIENT)
Dept: SURGERY | Facility: CLINIC | Age: 42
End: 2022-01-10

## 2022-01-10 NOTE — TELEPHONE ENCOUNTER
Forms from [facility] Notice of Disability review were received via fax.  Foems were scanned to MAAME and originals were put in MAAME office at [facility]

## 2022-01-10 NOTE — TELEPHONE ENCOUNTER
[facility] occupational therapist Raffi Simmons called regarding patients care. Raffi Simmons would like a call back for additional care instructions.       Phone # [phone]

## 2022-01-12 ENCOUNTER — TELEPHONE (OUTPATIENT)
Dept: FAMILY MEDICINE CLINIC | Facility: CLINIC | Age: 42
End: 2022-01-12

## 2022-01-12 NOTE — TELEPHONE ENCOUNTER
Returned a call for [employer] from [employer], Rebeca Mcneil had questions in regards to pt's LTD, informed Rebeca Mcneil that the forms department did not received any forms to be completed for pt and if forms are needed he is able to email or fax them to the forms department, Rebeca Mcneil verbalized understanding and stated he will reach out to Dr Marni Rodriguez office to inquire in regards to LTD forms that were completed by him in April.

## 2022-01-13 NOTE — TELEPHONE ENCOUNTER
Received a notice of disability review claim forms via fax. No contact information provided on forms. Copies of forms received emailed to [facility] department for review. Original forms received via fax placed in [facility] office in [address] [place] [address].

## 2022-01-19 ENCOUNTER — OFFICE VISIT (OUTPATIENT)
Dept: ORTHOPEDICS CLINIC | Facility: CLINIC | Age: 42
End: 2022-01-19
Payer: OTHER MISCELLANEOUS

## 2022-01-19 DIAGNOSIS — G56.02 CARPAL TUNNEL SYNDROME, LEFT: ICD-10-CM

## 2022-01-19 DIAGNOSIS — M77.11 LATERAL EPICONDYLITIS OF RIGHT ELBOW: ICD-10-CM

## 2022-01-19 DIAGNOSIS — M77.02 MEDIAL EPICONDYLITIS OF LEFT ELBOW: Primary | ICD-10-CM

## 2022-01-19 PROCEDURE — 99213 OFFICE O/P EST LOW 20 MIN: CPT | Performed by: ORTHOPAEDIC SURGERY

## 2022-01-19 NOTE — PROGRESS NOTES
NURSING INTAKE COMMENTS: Patient presents with:  Elbow Pain: Bilateral carpal tunnel - 6/10 pain in office      HPI: This 35year old female presents today with complaints of bilateral upper extremity pain follow-up.   She has been undergoing physical thera 1 capsule by mouth daily. 100 capsule 1   • atorvastatin 10 MG Oral Tab Take 1 tablet (10 mg total) by mouth nightly.  90 tablet 3       Ibuprofen               SWELLING, Tightness in Throat  Family History   Problem Relation Age of Onset   • Lipids Mother (Approximate)   Constitutional: appears well hydrated, alert and responsive, no acute distress noted  Extremities: Right elbow tender at the lateral epicondyle. No pain with pronation supination of the forearm.   There is pain in the dorsal wrist with forc concerned advised continued outpatient physical therapy. She asked for potential alternative treatments for this problem. I suggested a trial of dry needling and physical therapy.   Per physical therapy has requested a home TENS unit for her upper extremi

## 2022-01-21 ENCOUNTER — OFFICE VISIT (OUTPATIENT)
Dept: FAMILY MEDICINE CLINIC | Facility: CLINIC | Age: 42
End: 2022-01-21
Payer: OTHER MISCELLANEOUS

## 2022-01-21 VITALS
HEIGHT: 64 IN | HEART RATE: 82 BPM | SYSTOLIC BLOOD PRESSURE: 118 MMHG | TEMPERATURE: 97 F | WEIGHT: 160 LBS | BODY MASS INDEX: 27.31 KG/M2 | DIASTOLIC BLOOD PRESSURE: 82 MMHG

## 2022-01-21 DIAGNOSIS — G56.03 BILATERAL CARPAL TUNNEL SYNDROME: ICD-10-CM

## 2022-01-21 DIAGNOSIS — G56.22 ULNAR NEURITIS, LEFT: ICD-10-CM

## 2022-01-21 DIAGNOSIS — M77.11 LATERAL EPICONDYLITIS OF BOTH ELBOWS: ICD-10-CM

## 2022-01-21 DIAGNOSIS — M77.02 MEDIAL EPICONDYLITIS OF LEFT ELBOW: Primary | ICD-10-CM

## 2022-01-21 DIAGNOSIS — M77.12 LATERAL EPICONDYLITIS OF BOTH ELBOWS: ICD-10-CM

## 2022-01-21 PROCEDURE — 99214 OFFICE O/P EST MOD 30 MIN: CPT | Performed by: FAMILY MEDICINE

## 2022-01-21 PROCEDURE — 3008F BODY MASS INDEX DOCD: CPT | Performed by: FAMILY MEDICINE

## 2022-01-21 PROCEDURE — 3074F SYST BP LT 130 MM HG: CPT | Performed by: FAMILY MEDICINE

## 2022-01-21 PROCEDURE — 3079F DIAST BP 80-89 MM HG: CPT | Performed by: FAMILY MEDICINE

## 2022-01-21 RX ORDER — LIDOCAINE 50 MG/G
1 PATCH TOPICAL EVERY 24 HOURS
Qty: 30 PATCH | Refills: 2 | Status: SHIPPED | OUTPATIENT
Start: 2022-01-21

## 2022-01-21 NOTE — PROGRESS NOTES
Patient ID: Natalia Moody is a 35year old female. HPI  Patient presents with: Worker's Comp    Last seen by me on 5/14/2021. Pt c/o the medial aspect of left elbow being swollen.  Pt occasionally feels like the area around the left elbow is Past Surgical History:   Procedure Laterality Date   • OTHER      removal of left ganglion cyst   • WRIST ARTHROSCOP,RELEASE Keisha STYLES Right 01/24/2020    Right endoscopic carpal tunnel release          Current Outpatient Medications   Medication Sig Assessment/Plan:      Diagnoses and all orders for this visit:    Medial epicondylitis of left elbow  -     PHYSICAL THERAPY EXTERNAL  I think occupational therapy would be quite helpful for her.   Went ahead and put in a new prescription for occupational t presence of Ashley Lombardo DO. Electronically Signed: Jose Edge, 1/21/2022, 10:15 AM.    I, Ashley Lombardo DO,  personally performed the services described in this documentation.  All medical record entries made by the scribe were at my direction

## 2022-01-22 RX ORDER — ACETAMINOPHEN AND CODEINE PHOSPHATE 300; 30 MG/1; MG/1
1 TABLET ORAL EVERY 6 HOURS PRN
Qty: 30 TABLET | Refills: 1 | Status: SHIPPED | OUTPATIENT
Start: 2022-01-22

## 2022-01-24 ENCOUNTER — PATIENT MESSAGE (OUTPATIENT)
Dept: ORTHOPEDICS CLINIC | Facility: CLINIC | Age: 42
End: 2022-01-24

## 2022-01-24 NOTE — TELEPHONE ENCOUNTER
Called and spoke with patient. Informed her that I was out myself for the last week and just returned to the office today. I am rooming in [place] however and will be back in the office tomorrow, where I can look at her surgical sheet.  Patient understood a

## 2022-01-27 ENCOUNTER — TELEPHONE (OUTPATIENT)
Dept: FAMILY MEDICINE CLINIC | Facility: CLINIC | Age: 42
End: 2022-01-27

## 2022-01-27 NOTE — TELEPHONE ENCOUNTER
Christin ISBELL- physical therapy would like to inform Dennis Jackson that she is discharging the patient at this time. She recommentded patient follow up with pcp. After  last 27 visits there has been no improvement. She as at max benefits of therapy.

## 2022-01-31 ENCOUNTER — TELEPHONE (OUTPATIENT)
Dept: ORTHOPEDICS CLINIC | Facility: CLINIC | Age: 42
End: 2022-01-31

## 2022-01-31 NOTE — TELEPHONE ENCOUNTER
Called and left a message for patient to call back. Is this under Workers Comp or another insurance.  Patient is to call back [phone]

## 2022-02-04 ENCOUNTER — TELEPHONE (OUTPATIENT)
Dept: ORTHOPEDICS CLINIC | Facility: CLINIC | Age: 42
End: 2022-02-04

## 2022-02-04 NOTE — TELEPHONE ENCOUNTER
Called and spoke with patient. Informed her that I am having a hard time getting a hold of her Workers Comp. I have faxed and called multiple times. Patient states that she wants surgery submitted through her insurance now. I informed her that we do not have any other insurance in our system. Patient will take pictures and send through [address] I will give to the  to enter and submit a PriorAuth    Informed patient that [facility]- may not approve surgery by 2/9/22, worse case we have to push surgery back.     Patient agrees with this plan

## 2022-02-04 NOTE — TELEPHONE ENCOUNTER
Type of surgery:  Right elbow lateral epicondryle debridement, extensor tendon repair  Date: 2/28/22  Location: [facility]  Medical Clearance:  No Dr Orosco Master     *Medical:      *Dental:      *Other:  Prior Authorization Status:   Workers Comp:  [place]:  [place]: Yes  POV: 3/9/22

## 2022-02-06 ENCOUNTER — LAB REQUISITION (OUTPATIENT)
Dept: SURGERY | Age: 42
End: 2022-02-06
Payer: MEDICAID

## 2022-02-07 LAB — SARS-COV-2 RNA RESP QL NAA+PROBE: NOT DETECTED

## 2022-02-07 NOTE — TELEPHONE ENCOUNTER
Noman Medeiros, surgery scheduler calling regarding prior authorization for surgery, patient has workmans comp. Please call at [phone], extension [phone], thanks.   Please Fax [phone]

## 2022-02-07 NOTE — TELEPHONE ENCOUNTER
Tried to contact patient's WC again this morning. Tried to go through the  this time, sent me straight to voicemail. Cody Dobbs [phone], informed them that patient is to have surgery on 2/9/22.

## 2022-02-08 ENCOUNTER — EKG ENCOUNTER (OUTPATIENT)
Dept: LAB | Age: 42
End: 2022-02-08
Attending: NURSE PRACTITIONER
Payer: MEDICAID

## 2022-02-08 ENCOUNTER — TELEPHONE (OUTPATIENT)
Dept: ORTHOPEDICS CLINIC | Facility: CLINIC | Age: 42
End: 2022-02-08

## 2022-02-08 ENCOUNTER — LAB ENCOUNTER (OUTPATIENT)
Dept: LAB | Age: 42
End: 2022-02-08
Attending: NURSE PRACTITIONER
Payer: MEDICAID

## 2022-02-08 ENCOUNTER — OFFICE VISIT (OUTPATIENT)
Dept: FAMILY MEDICINE CLINIC | Facility: CLINIC | Age: 42
End: 2022-02-08
Payer: MEDICAID

## 2022-02-08 VITALS
DIASTOLIC BLOOD PRESSURE: 79 MMHG | HEART RATE: 80 BPM | HEIGHT: 64 IN | SYSTOLIC BLOOD PRESSURE: 116 MMHG | BODY MASS INDEX: 28 KG/M2 | WEIGHT: 164 LBS

## 2022-02-08 DIAGNOSIS — M77.02 MEDIAL EPICONDYLITIS OF ELBOW, LEFT: ICD-10-CM

## 2022-02-08 DIAGNOSIS — Z01.818 PRE-OP EXAMINATION: Primary | ICD-10-CM

## 2022-02-08 DIAGNOSIS — Z01.818 PRE-OP EXAMINATION: ICD-10-CM

## 2022-02-08 DIAGNOSIS — G56.03 BILATERAL CARPAL TUNNEL SYNDROME: ICD-10-CM

## 2022-02-08 DIAGNOSIS — M77.11 LATERAL EPICONDYLITIS OF RIGHT ELBOW: ICD-10-CM

## 2022-02-08 LAB
ALBUMIN SERPL-MCNC: 3.6 G/DL (ref 3.4–5)
ALBUMIN/GLOB SERPL: 0.9 {RATIO} (ref 1–2)
ALP LIVER SERPL-CCNC: 116 U/L
ALT SERPL-CCNC: 25 U/L
ANION GAP SERPL CALC-SCNC: 8 MMOL/L (ref 0–18)
APTT PPP: 28.2 SECONDS (ref 23.3–35.6)
AST SERPL-CCNC: 14 U/L (ref 15–37)
B-HCG SERPL-ACNC: <1 MIU/ML
BILIRUB SERPL-MCNC: 0.4 MG/DL (ref 0.1–2)
BUN BLD-MCNC: 11 MG/DL (ref 7–18)
BUN/CREAT SERPL: 16.7 (ref 10–20)
CALCIUM BLD-MCNC: 9.2 MG/DL (ref 8.5–10.1)
CHLORIDE SERPL-SCNC: 105 MMOL/L (ref 98–112)
CO2 SERPL-SCNC: 26 MMOL/L (ref 21–32)
CREAT BLD-MCNC: 0.66 MG/DL
DEPRECATED RDW RBC AUTO: 44.3 FL (ref 35.1–46.3)
ERYTHROCYTE [DISTWIDTH] IN BLOOD BY AUTOMATED COUNT: 14.3 % (ref 11–15)
FASTING STATUS PATIENT QL REPORTED: YES
GLOBULIN PLAS-MCNC: 3.9 G/DL (ref 2.8–4.4)
GLUCOSE BLD-MCNC: 100 MG/DL (ref 70–99)
HCT VFR BLD AUTO: 38.7 %
HGB BLD-MCNC: 12.7 G/DL
INR BLD: 0.93 (ref 0.8–1.2)
MCH RBC QN AUTO: 27.9 PG (ref 26–34)
MCHC RBC AUTO-ENTMCNC: 32.8 G/DL (ref 31–37)
MCV RBC AUTO: 84.9 FL
OSMOLALITY SERPL CALC.SUM OF ELEC: 287 MOSM/KG (ref 275–295)
PLATELET # BLD AUTO: 336 10(3)UL (ref 150–450)
PROT SERPL-MCNC: 7.5 G/DL (ref 6.4–8.2)
PROTHROMBIN TIME: 12.6 SECONDS (ref 11.6–14.8)
RBC # BLD AUTO: 4.56 X10(6)UL
SODIUM SERPL-SCNC: 139 MMOL/L (ref 136–145)
WBC # BLD AUTO: 6.9 X10(3) UL (ref 4–11)

## 2022-02-08 PROCEDURE — 99214 OFFICE O/P EST MOD 30 MIN: CPT | Performed by: NURSE PRACTITIONER

## 2022-02-08 PROCEDURE — 3078F DIAST BP <80 MM HG: CPT | Performed by: NURSE PRACTITIONER

## 2022-02-08 PROCEDURE — 93010 ELECTROCARDIOGRAM REPORT: CPT | Performed by: NURSE PRACTITIONER

## 2022-02-08 PROCEDURE — 85730 THROMBOPLASTIN TIME PARTIAL: CPT

## 2022-02-08 PROCEDURE — 80053 COMPREHEN METABOLIC PANEL: CPT

## 2022-02-08 PROCEDURE — 84702 CHORIONIC GONADOTROPIN TEST: CPT

## 2022-02-08 PROCEDURE — 93005 ELECTROCARDIOGRAM TRACING: CPT

## 2022-02-08 PROCEDURE — 85610 PROTHROMBIN TIME: CPT

## 2022-02-08 PROCEDURE — 85027 COMPLETE CBC AUTOMATED: CPT

## 2022-02-08 PROCEDURE — 36415 COLL VENOUS BLD VENIPUNCTURE: CPT

## 2022-02-08 PROCEDURE — 3008F BODY MASS INDEX DOCD: CPT | Performed by: NURSE PRACTITIONER

## 2022-02-08 PROCEDURE — 3074F SYST BP LT 130 MM HG: CPT | Performed by: NURSE PRACTITIONER

## 2022-02-08 NOTE — ASSESSMENT & PLAN NOTE
Surgery rescheduled for 2/28 while pt was in office  Pre-op exam completed  Pre-op labs and ekg ordered

## 2022-02-08 NOTE — TELEPHONE ENCOUNTER
Type of surgery:  RIGHT ELBOW LATERAL EPICONDRYLE DEBRIDEMENT, EXTENSOR TENDON REPAIR  Date:  2/28/22  Location:   [facility] - OUTPATIENT  Medical Clearance:      *Medical:  NO      *Dental:   NO      *Other:  Prior Authorization Status:  PENDING  Workers Comp:  [place]:  [place]:  YES  POV:  3/9/22

## 2022-02-08 NOTE — TELEPHONE ENCOUNTER
Called and spoke with patient. She had asked that we send the claim through her Insurance, not WC. As we have not been able to get a hold of them for approval.   Patient has Medicaid, therefore we have to move it to the hospital. Spoke with Skyler Browne, the soonest we can get her in is on 2/28/22. Sent her an email with confirmation and asking if there is a case that cancels prior to, to let us know as patient would like to move it up. Called patient back, informed her of the situation. She agrees to move forward with surgery being at [address] [place] [address] on 2/28/22. And asked for her to be put on a waiting list, if anyone cancels.     Surgery cancelled through Case Tabs and new request sent through Epic

## 2022-02-15 ENCOUNTER — TELEPHONE (OUTPATIENT)
Dept: FAMILY MEDICINE CLINIC | Facility: CLINIC | Age: 42
End: 2022-02-15

## 2022-02-26 ENCOUNTER — LAB ENCOUNTER (OUTPATIENT)
Dept: LAB | Age: 42
End: 2022-02-26
Attending: ORTHOPAEDIC SURGERY
Payer: MEDICAID

## 2022-02-26 DIAGNOSIS — Z01.818 PRE-OP TESTING: ICD-10-CM

## 2022-02-28 ENCOUNTER — ANESTHESIA EVENT (OUTPATIENT)
Dept: SURGERY | Facility: HOSPITAL | Age: 42
End: 2022-02-28
Payer: MEDICAID

## 2022-02-28 ENCOUNTER — HOSPITAL ENCOUNTER (OUTPATIENT)
Facility: HOSPITAL | Age: 42
Setting detail: HOSPITAL OUTPATIENT SURGERY
Discharge: HOME | End: 2022-02-28
Attending: ORTHOPAEDIC SURGERY | Admitting: ORTHOPAEDIC SURGERY
Payer: MEDICAID

## 2022-02-28 ENCOUNTER — ANESTHESIA (OUTPATIENT)
Dept: SURGERY | Facility: HOSPITAL | Age: 42
End: 2022-02-28
Payer: MEDICAID

## 2022-02-28 VITALS
HEIGHT: 64 IN | DIASTOLIC BLOOD PRESSURE: 71 MMHG | SYSTOLIC BLOOD PRESSURE: 119 MMHG | RESPIRATION RATE: 16 BRPM | WEIGHT: 165 LBS | TEMPERATURE: 98 F | HEART RATE: 75 BPM | OXYGEN SATURATION: 96 % | BODY MASS INDEX: 28.17 KG/M2

## 2022-02-28 DIAGNOSIS — M77.11 LATERAL EPICONDYLITIS OF RIGHT ELBOW: ICD-10-CM

## 2022-02-28 DIAGNOSIS — Z01.818 PRE-OP TESTING: Primary | ICD-10-CM

## 2022-02-28 LAB
B-HCG UR QL: NEGATIVE
SARS-COV-2 RNA RESP QL NAA+PROBE: NOT DETECTED

## 2022-02-28 PROCEDURE — 0LQ30ZZ REPAIR RIGHT UPPER ARM TENDON, OPEN APPROACH: ICD-10-PCS | Performed by: ORTHOPAEDIC SURGERY

## 2022-02-28 PROCEDURE — 81025 URINE PREGNANCY TEST: CPT

## 2022-02-28 PROCEDURE — 88304 TISSUE EXAM BY PATHOLOGIST: CPT | Performed by: ORTHOPAEDIC SURGERY

## 2022-02-28 PROCEDURE — 0JBD0ZZ EXCISION OF RIGHT UPPER ARM SUBCUTANEOUS TISSUE AND FASCIA, OPEN APPROACH: ICD-10-PCS | Performed by: ORTHOPAEDIC SURGERY

## 2022-02-28 RX ORDER — MORPHINE SULFATE 10 MG/ML
6 INJECTION, SOLUTION INTRAMUSCULAR; INTRAVENOUS EVERY 10 MIN PRN
Status: DISCONTINUED | OUTPATIENT
Start: 2022-02-28 | End: 2022-02-28

## 2022-02-28 RX ORDER — MORPHINE SULFATE 4 MG/ML
4 INJECTION, SOLUTION INTRAMUSCULAR; INTRAVENOUS EVERY 10 MIN PRN
Status: DISCONTINUED | OUTPATIENT
Start: 2022-02-28 | End: 2022-02-28

## 2022-02-28 RX ORDER — CEFAZOLIN SODIUM/WATER 2 G/20 ML
2 SYRINGE (ML) INTRAVENOUS ONCE
Status: DISCONTINUED | OUTPATIENT
Start: 2022-02-28 | End: 2022-02-28 | Stop reason: HOSPADM

## 2022-02-28 RX ORDER — SODIUM CHLORIDE, SODIUM LACTATE, POTASSIUM CHLORIDE, CALCIUM CHLORIDE 600; 310; 30; 20 MG/100ML; MG/100ML; MG/100ML; MG/100ML
INJECTION, SOLUTION INTRAVENOUS CONTINUOUS
Status: DISCONTINUED | OUTPATIENT
Start: 2022-02-28 | End: 2022-02-28

## 2022-02-28 RX ORDER — ONDANSETRON 2 MG/ML
4 INJECTION INTRAMUSCULAR; INTRAVENOUS ONCE AS NEEDED
Status: COMPLETED | OUTPATIENT
Start: 2022-02-28 | End: 2022-02-28

## 2022-02-28 RX ORDER — HYDROMORPHONE HYDROCHLORIDE 1 MG/ML
0.6 INJECTION, SOLUTION INTRAMUSCULAR; INTRAVENOUS; SUBCUTANEOUS EVERY 5 MIN PRN
Status: DISCONTINUED | OUTPATIENT
Start: 2022-02-28 | End: 2022-02-28

## 2022-02-28 RX ORDER — ONDANSETRON 2 MG/ML
INJECTION INTRAMUSCULAR; INTRAVENOUS AS NEEDED
Status: DISCONTINUED | OUTPATIENT
Start: 2022-02-28 | End: 2022-02-28 | Stop reason: SURG

## 2022-02-28 RX ORDER — MORPHINE SULFATE 4 MG/ML
2 INJECTION, SOLUTION INTRAMUSCULAR; INTRAVENOUS EVERY 10 MIN PRN
Status: DISCONTINUED | OUTPATIENT
Start: 2022-02-28 | End: 2022-02-28

## 2022-02-28 RX ORDER — PROCHLORPERAZINE EDISYLATE 5 MG/ML
5 INJECTION INTRAMUSCULAR; INTRAVENOUS ONCE AS NEEDED
Status: COMPLETED | OUTPATIENT
Start: 2022-02-28 | End: 2022-02-28

## 2022-02-28 RX ORDER — HYDROCODONE BITARTRATE AND ACETAMINOPHEN 5; 325 MG/1; MG/1
2 TABLET ORAL AS NEEDED
Status: DISCONTINUED | OUTPATIENT
Start: 2022-02-28 | End: 2022-02-28

## 2022-02-28 RX ORDER — HYDROCODONE BITARTRATE AND ACETAMINOPHEN 5; 325 MG/1; MG/1
1 TABLET ORAL AS NEEDED
Status: DISCONTINUED | OUTPATIENT
Start: 2022-02-28 | End: 2022-02-28

## 2022-02-28 RX ORDER — HYDROCODONE BITARTRATE AND ACETAMINOPHEN 5; 325 MG/1; MG/1
1 TABLET ORAL EVERY 4 HOURS PRN
Qty: 15 TABLET | Refills: 0 | Status: SHIPPED | OUTPATIENT
Start: 2022-02-28 | End: 2022-03-15

## 2022-02-28 RX ORDER — HALOPERIDOL 5 MG/ML
0.25 INJECTION INTRAMUSCULAR ONCE AS NEEDED
Status: DISCONTINUED | OUTPATIENT
Start: 2022-02-28 | End: 2022-02-28

## 2022-02-28 RX ORDER — CEFAZOLIN SODIUM/WATER 2 G/20 ML
SYRINGE (ML) INTRAVENOUS AS NEEDED
Status: DISCONTINUED | OUTPATIENT
Start: 2022-02-28 | End: 2022-02-28 | Stop reason: SURG

## 2022-02-28 RX ORDER — HYDROMORPHONE HYDROCHLORIDE 1 MG/ML
0.4 INJECTION, SOLUTION INTRAMUSCULAR; INTRAVENOUS; SUBCUTANEOUS EVERY 5 MIN PRN
Status: DISCONTINUED | OUTPATIENT
Start: 2022-02-28 | End: 2022-02-28

## 2022-02-28 RX ORDER — NALOXONE HYDROCHLORIDE 0.4 MG/ML
80 INJECTION, SOLUTION INTRAMUSCULAR; INTRAVENOUS; SUBCUTANEOUS AS NEEDED
Status: DISCONTINUED | OUTPATIENT
Start: 2022-02-28 | End: 2022-02-28

## 2022-02-28 RX ORDER — ACETAMINOPHEN 500 MG
1000 TABLET ORAL ONCE
Status: COMPLETED | OUTPATIENT
Start: 2022-02-28 | End: 2022-02-28

## 2022-02-28 RX ORDER — LIDOCAINE HYDROCHLORIDE 10 MG/ML
INJECTION, SOLUTION EPIDURAL; INFILTRATION; INTRACAUDAL; PERINEURAL AS NEEDED
Status: DISCONTINUED | OUTPATIENT
Start: 2022-02-28 | End: 2022-02-28 | Stop reason: SURG

## 2022-02-28 RX ORDER — BUPIVACAINE HYDROCHLORIDE AND EPINEPHRINE 5; 5 MG/ML; UG/ML
INJECTION, SOLUTION PERINEURAL AS NEEDED
Status: DISCONTINUED | OUTPATIENT
Start: 2022-02-28 | End: 2022-02-28 | Stop reason: HOSPADM

## 2022-02-28 RX ORDER — DEXAMETHASONE SODIUM PHOSPHATE 4 MG/ML
VIAL (ML) INJECTION AS NEEDED
Status: DISCONTINUED | OUTPATIENT
Start: 2022-02-28 | End: 2022-02-28 | Stop reason: SURG

## 2022-02-28 RX ORDER — HYDROCODONE BITARTRATE AND ACETAMINOPHEN 5; 325 MG/1; MG/1
1 TABLET ORAL EVERY 4 HOURS PRN
Status: DISCONTINUED | OUTPATIENT
Start: 2022-02-28 | End: 2022-02-28

## 2022-02-28 RX ORDER — HYDROMORPHONE HYDROCHLORIDE 1 MG/ML
0.2 INJECTION, SOLUTION INTRAMUSCULAR; INTRAVENOUS; SUBCUTANEOUS EVERY 5 MIN PRN
Status: DISCONTINUED | OUTPATIENT
Start: 2022-02-28 | End: 2022-02-28

## 2022-02-28 RX ADMIN — CEFAZOLIN SODIUM/WATER 2 G: 2 G/20 ML SYRINGE (ML) INTRAVENOUS at 13:50:00

## 2022-02-28 RX ADMIN — ONDANSETRON 4 MG: 2 INJECTION INTRAMUSCULAR; INTRAVENOUS at 13:39:00

## 2022-02-28 RX ADMIN — DEXAMETHASONE SODIUM PHOSPHATE 8 MG: 4 MG/ML VIAL (ML) INJECTION at 13:39:00

## 2022-02-28 RX ADMIN — LIDOCAINE HYDROCHLORIDE 50 MG: 10 INJECTION, SOLUTION EPIDURAL; INFILTRATION; INTRACAUDAL; PERINEURAL at 13:39:00

## 2022-02-28 NOTE — ANESTHESIA PROCEDURE NOTES
Airway  Date/Time: 2/28/2022 1:42 PM  Urgency: Elective    Airway not difficult    General Information and Staff    Patient location during procedure: OR  Anesthesiologist: Jennifer Ann MD  Resident/CRNA: Dona Trujillo CRNA  Performed: CRNA     Indications and Patient Condition  Indications for airway management: anesthesia  Spontaneous ventilation: present  Sedation level: deep  Preoxygenated: yes  Patient position: sniffing  Mask difficulty assessment: 1 - vent by mask    Final Airway Details  Final airway type: supraglottic airway      Successful airway: classic  Size 4      Number of attempts at approach: 1    Additional Comments  Atrauamatic / bite block in place

## 2022-02-28 NOTE — OPERATIVE REPORT
Operative Note    Patient Name: Herve Melvin    Preoperative Diagnosis: Lateral epicondylitis of right elbow [M77.11]    Postoperative Diagnosis: Lateral epicondylitis of right elbow [M77.11]    Primary Surgeon: Lester Eastman MD     Assistant: Gerhardt Mani, [facility]    Procedures: R elbow lateral epicondylar debridement, extensor tendon repair    Surgical Findings: above    Anesthesia: General    Complications: none    Specimen: R elbow extensor tendon to pathology    Drains: none    Condition: stable to RR    Estimated Blood Loss: Anjum Cooney MD

## 2022-03-01 NOTE — OPERATIVE REPORT
[facility]    PATIENT'S NAME: Merna Patel   ATTENDING PHYSICIAN: Janusz Bernal MD   OPERATING PHYSICIAN: Jaunsz Bernal MD   PATIENT ACCOUNT#:   [id number]    LOCATION:  [facility] 6 [facility] 10  MEDICAL RECORD #:   [id number]       YOB: 1980  ADMISSION DATE:       02/28/2022      OPERATION DATE:  02/28/2022    OPERATIVE REPORT      PREOPERATIVE DIAGNOSIS:  Right elbow recalcitrant lateral epicondylitis. POSTOPERATIVE DIAGNOSIS:  Right elbow recalcitrant lateral epicondylitis. PROCEDURE:  Right elbow lateral epicondylar debridement with extensor tendon repair. ASSISTANT:  Noe Carrizales PA-C. ANESTHESIA:  General.    COMPLICATIONS:  None. BLOOD LOSS:  5 mL. SPECIMEN:  Right elbow extensor tendon to Pathology. DRAINS:  None. INDICATIONS:  The patient is a 43-year-old female with history of right elbow pain unresponsive to conservative care. She has failed all conservative treatments including anti-inflammatory medications, therapeutic exercise, home exercises, topical ointments, and injections. After discussion with the patient the risks and benefits of proceeding with operative treatment of the right elbow, she wished to proceed with surgery. OPERATIVE TECHNIQUE:  The patient was identified in the preoperative holding area. The appropriate consents were obtained. She was taken the operating room, placed in supine position on the operating room table. After adequate general anesthesia was obtained, a tourniquet was placed on the right arm. The patient was given preoperative IV antibiotics. The right elbow and upper extremity were then prepped and draped in sterile fashion. The extremity was exsanguinated, and the tourniquet was inflated to 250 mmHg. A longitudinal incision was then made over the lateral epicondylar area of the lateral elbow. Subcutaneous flaps were developed. The extensor tendon and aponeurosis were identified and inspected. Along the more posterior aspect of the extensor tendon insertion, an  incision was made longitudinally in line with the fibers. The extensor carpi radialis longus tendon and musculature were elevated and the ECRB, extensor carpi radialis brevis, tendon was longitudinally incised. A full-thickness arthrotomy was made at the level of the radiocapitellar joint. The radial head was inspected and appeared intact. The lateral condyle was debrided of soft tissues. There was some degenerative-appearing ECRB tendon posteriorly which was debrided, and the extensor tendon was thoroughly released in this area. After debridement of soft tissues from the lateral epicondyle, a bleeding bed was identified and a small drill was used to create a  hole for placement of a BioComposite Arthrex anchor. The anchor was then placed after tapping appropriately. This was a 4.5 mm BioComposite anchor double loaded. We passed 4 strands of suture through the free edge of the ECRB tendon and tied 2 knots. This adequately secured the tendon back to the lateral condyle. The extensor carpi radialis longus tendon was then repaired over the area with 2-0 Vicryl sutures. Skin and subcutaneous layers were closed with 2-0 Vicryl sutures and a running subcuticular 4-0 PDS suture. Steri-Strips were applied, followed by a sterile dressing and posterior mold splint. The patient's anesthesia was reversed and 10 mL of 0.5% Marcaine with epinephrine was locally infiltrated at the site of the repair. After reversal of anesthesia, she was extubated and taken to the recovery room in stable condition. All sponge and instrument counts were reported as correct. The attending physician, Dr. Leonides Hanson, was present and performed all critical portions of the procedure. There were no complications. The first assistant was medically necessary for the surgery.   He assisted with patient positioning, retraction of soft tissue for accurate removal of tendon tissue and placement of anchor. He also assisted with hemostasis, wound closure, and application of the splint. Without the aid of the assistant, the surgical procedure were not have been possible. Dictated By Adan Yan.  Haim Cardoza MD  d: 02/28/2022 14:28:01  t: 02/28/2022 23:38:11  Job [id number]  DLO/

## 2022-03-07 ENCOUNTER — TELEPHONE (OUTPATIENT)
Dept: ORTHOPEDICS CLINIC | Facility: CLINIC | Age: 42
End: 2022-03-07

## 2022-03-07 NOTE — TELEPHONE ENCOUNTER
Called [facility] in [place] and s/w pharmacy and they informed me that Shay Kinnye is on backorder and pt did not receive the 2/28 rx . Pt pharmacy for dar has been updated. Please resend rx. Also PO instructions say pt to f/u in 1 wk, but pt thinks f/u is to be 2 wks-please conifrm.

## 2022-03-07 NOTE — TELEPHONE ENCOUNTER
Per pt had surgery 2/28 and needs a 2 week post op. Per [facility] not [facility] and asking for pain medication to go to [facility] in [place].  Please advise

## 2022-03-08 RX ORDER — HYDROCODONE BITARTRATE AND ACETAMINOPHEN 5; 325 MG/1; MG/1
1 TABLET ORAL EVERY 6 HOURS PRN
Qty: 10 TABLET | Refills: 0 | Status: SHIPPED | OUTPATIENT
Start: 2022-03-08 | End: 2022-03-15

## 2022-03-08 NOTE — TELEPHONE ENCOUNTER
[address] If pt has not needed narcotics after surgery, then why does she need them now? I prescribed 10 norco today.  Also use ibuprofen and ice

## 2022-03-09 ENCOUNTER — OFFICE VISIT (OUTPATIENT)
Dept: ORTHOPEDICS CLINIC | Facility: CLINIC | Age: 42
End: 2022-03-09
Payer: MEDICAID

## 2022-03-09 DIAGNOSIS — M77.11 LATERAL EPICONDYLITIS OF RIGHT ELBOW: Primary | ICD-10-CM

## 2022-03-09 DIAGNOSIS — Z47.89 ORTHOPEDIC AFTERCARE: ICD-10-CM

## 2022-03-09 PROCEDURE — 99024 POSTOP FOLLOW-UP VISIT: CPT | Performed by: ORTHOPAEDIC SURGERY

## 2022-03-10 ENCOUNTER — TELEPHONE (OUTPATIENT)
Dept: ORTHOPEDICS CLINIC | Facility: CLINIC | Age: 42
End: 2022-03-10

## 2022-03-10 NOTE — TELEPHONE ENCOUNTER
Pt requesting work status letter to be placed on mychart, note stating she is approved for long term benefits and to stay off work.  Please advise

## 2022-03-10 NOTE — TELEPHONE ENCOUNTER
Letter generated and available via [employer]. Left message informing letter generated and available via [employer]. Office number provided if any additional questions.

## 2022-03-15 ENCOUNTER — OFFICE VISIT (OUTPATIENT)
Dept: PHYSICAL MEDICINE AND REHAB | Facility: CLINIC | Age: 42
End: 2022-03-15
Payer: MEDICAID

## 2022-03-15 ENCOUNTER — HOSPITAL ENCOUNTER (OUTPATIENT)
Dept: GENERAL RADIOLOGY | Age: 42
Discharge: HOME | End: 2022-03-15
Attending: PHYSICAL MEDICINE & REHABILITATION
Payer: MEDICAID

## 2022-03-15 ENCOUNTER — TELEPHONE (OUTPATIENT)
Dept: NEUROLOGY | Facility: CLINIC | Age: 42
End: 2022-03-15

## 2022-03-15 ENCOUNTER — TELEPHONE (OUTPATIENT)
Dept: PHYSICAL MEDICINE AND REHAB | Facility: CLINIC | Age: 42
End: 2022-03-15

## 2022-03-15 VITALS
SYSTOLIC BLOOD PRESSURE: 108 MMHG | HEART RATE: 86 BPM | OXYGEN SATURATION: 98 % | DIASTOLIC BLOOD PRESSURE: 86 MMHG | WEIGHT: 160 LBS | BODY MASS INDEX: 27.31 KG/M2 | HEIGHT: 64 IN

## 2022-03-15 DIAGNOSIS — M19.071 OSTEOARTHRITIS OF TOE JOINT, RIGHT: Primary | ICD-10-CM

## 2022-03-15 DIAGNOSIS — M79.674 CHRONIC TOE PAIN, RIGHT FOOT: ICD-10-CM

## 2022-03-15 DIAGNOSIS — M19.071 ARTHRITIS OF RIGHT ANKLE: ICD-10-CM

## 2022-03-15 DIAGNOSIS — G89.29 CHRONIC TOE PAIN, RIGHT FOOT: ICD-10-CM

## 2022-03-15 PROCEDURE — 73660 X-RAY EXAM OF TOE(S): CPT | Performed by: PHYSICAL MEDICINE & REHABILITATION

## 2022-03-15 PROCEDURE — 3074F SYST BP LT 130 MM HG: CPT | Performed by: PHYSICAL MEDICINE & REHABILITATION

## 2022-03-15 PROCEDURE — 99214 OFFICE O/P EST MOD 30 MIN: CPT | Performed by: PHYSICAL MEDICINE & REHABILITATION

## 2022-03-15 PROCEDURE — 3079F DIAST BP 80-89 MM HG: CPT | Performed by: PHYSICAL MEDICINE & REHABILITATION

## 2022-03-15 PROCEDURE — 3008F BODY MASS INDEX DOCD: CPT | Performed by: PHYSICAL MEDICINE & REHABILITATION

## 2022-03-15 NOTE — PATIENT INSTRUCTIONS
1) Please get X-rays of the RIGHT big toe today on your way out. 2) My office will call you to schedule the RIGHT great toe CSI under ultrasound guidance once the procedure is approved by your insurance carrier. Lets do the injection after you see Dr. Leonides Hanson  3) Start voltaren gel over the right big toe 3-4 times per day.  You can also use a lidoderm patch

## 2022-03-15 NOTE — TELEPHONE ENCOUNTER
Called [facility]  for authorization of approval of RIGHT great toe CSi under ultrasound guidance cpt codes 72177, . S/w Aziza Swanson. Authorization is not required. Reference # [id number]. Will inform Nursing.

## 2022-03-21 ENCOUNTER — TELEPHONE (OUTPATIENT)
Dept: PHYSICAL MEDICINE AND REHAB | Facility: CLINIC | Age: 42
End: 2022-03-21

## 2022-03-22 ENCOUNTER — TELEPHONE (OUTPATIENT)
Dept: PHYSICAL MEDICINE AND REHAB | Facility: CLINIC | Age: 42
End: 2022-03-22

## 2022-03-22 NOTE — TELEPHONE ENCOUNTER
Spoke with patient yesterday and let her know she can purchase voltaren gel otc. She requested I send her a [employer] message with this information. Aleth message sent.

## 2022-03-22 NOTE — TELEPHONE ENCOUNTER
Recv'd request for PA-diclofenac 1 % External Gel- placed in nurses bin.
Refer to TE 03/21/22.
MEDICAL/SURGICAL

## 2022-03-29 DIAGNOSIS — E55.9 VITAMIN D DEFICIENCY: ICD-10-CM

## 2022-03-29 NOTE — TELEPHONE ENCOUNTER
Please review. Protocol Failed or has no Protocol  Requested Prescriptions   Pending Prescriptions Disp Refills    Cholecalciferol (VITAMIN D3) 25 MCG (1000 UT) Oral Cap 100 capsule 1     Sig: Take 1 capsule by mouth daily.         There is no refill protocol information for this order         Future Appointments         Provider Department Appt Notes    In 2 weeks MD NELDA Gomez, [facility], [place]-Physiatry RIGHT great toe CSi under ultrasound guidance     In 2 weeks Jim Comer MD [facility], [address], [place] [address]    In 2 months Jim Comer MD [facility], [address] left elbow    In 2 months Jim Comer MD [facility], [address] pre-op left elbow          Recent Outpatient Visits              2 weeks ago Osteoarthritis of toe joint, right    [facility], [place]-Physiatry Ethan English MD    Office Visit    2 weeks ago Lateral epicondylitis of right elbow    [facility], [address], [place], Hugh Otero MD    Office Visit    1 month ago Pre-op examination    [facility] [place], [facility], [address], [address], APRN    Office Visit    2 months ago Medial epicondylitis of left elbow    [facility] [place], [facility], [address], [address], [place],     Office Visit    2 months ago Medial epicondylitis of left elbow    [facility] for Hugh Leahy MD    Office Visit

## 2022-03-29 NOTE — TELEPHONE ENCOUNTER
Please review. Protocol Failed or has no Protocol  Requested Prescriptions   Pending Prescriptions Disp Refills    Cholecalciferol (VITAMIN D3) 25 MCG (1000 UT) Oral Cap 100 capsule 1     Sig: Take 1 capsule by mouth daily.         There is no refill protocol information for this order         Future Appointments         Provider Department Appt Notes    In 2 weeks MD NELDA Anand, [facility], [place]-Physiatry RIGHT great toe CSi under ultrasound guidance     In 2 weeks Alma Rosa Glover MD [facility], [address] [facility] [facility], [place] [address]    In 2 months Alma Rosa Glover MD [facility], [address] left elbow    In 2 months Alma Rosa Glover MD [facility], [address] pre-op left elbow          Recent Outpatient Visits              2 weeks ago Osteoarthritis of toe joint, right    [facility], [place]-Physiatry Dunia Blount MD    Office Visit    2 weeks ago Lateral epicondylitis of right elbow    [facility], [address] [facility] [facility], [place], Amarjit Brown MD    Office Visit    1 month ago Pre-op examination    [facility] [place], [facility], [address], [address], Octavia Wright, APRN    Office Visit    2 months ago Medial epicondylitis of left elbow    [facility] [place], [facility], [address], [address], [place], DO    Office Visit    2 months ago Medial epicondylitis of left elbow    [facility]    Office Visit

## 2022-03-30 ENCOUNTER — TELEPHONE (OUTPATIENT)
Dept: ORTHOPEDICS CLINIC | Facility: CLINIC | Age: 42
End: 2022-03-30

## 2022-03-30 DIAGNOSIS — M77.11 LATERAL EPICONDYLITIS OF RIGHT ELBOW: Primary | ICD-10-CM

## 2022-03-30 NOTE — TELEPHONE ENCOUNTER
Kim from [facility] PT requesting referral to be changed from PT to OT.  Please advise     Fax: [phone]

## 2022-04-01 NOTE — TELEPHONE ENCOUNTER
1st attempt - [employer] message sent for patient to contact the office to schedule an appointment; see notes below.

## 2022-04-03 ENCOUNTER — PATIENT MESSAGE (OUTPATIENT)
Dept: PHYSICAL MEDICINE AND REHAB | Facility: CLINIC | Age: 42
End: 2022-04-03

## 2022-04-04 ENCOUNTER — PATIENT MESSAGE (OUTPATIENT)
Dept: ORTHOPEDICS CLINIC | Facility: CLINIC | Age: 42
End: 2022-04-04

## 2022-04-04 ENCOUNTER — TELEPHONE (OUTPATIENT)
Dept: ORTHOPEDICS CLINIC | Facility: CLINIC | Age: 42
End: 2022-04-04

## 2022-04-04 NOTE — TELEPHONE ENCOUNTER
Per Josefa Angel needs surgical diagnosis, exactly what surgery was performed and any restrictions.     Fax: [phone]

## 2022-04-04 NOTE — TELEPHONE ENCOUNTER
From: Jasvir Reyes  To: Azam Turcios MD  Sent: 4/3/2022 2:00 AM CDT  Subject: Physical therapy request    I have a flare up of chronic pain in my right toe that extends to the back of my heel,(left side) can you please give me a referral for therapy? I am currently receiving occupational therapy at [facility].  The Fax # is: [phone]

## 2022-04-05 ENCOUNTER — OFFICE VISIT (OUTPATIENT)
Dept: FAMILY MEDICINE CLINIC | Facility: CLINIC | Age: 42
End: 2022-04-05
Payer: OTHER MISCELLANEOUS

## 2022-04-05 VITALS
DIASTOLIC BLOOD PRESSURE: 87 MMHG | WEIGHT: 160 LBS | BODY MASS INDEX: 27.31 KG/M2 | HEIGHT: 64 IN | TEMPERATURE: 97 F | HEART RATE: 82 BPM | SYSTOLIC BLOOD PRESSURE: 130 MMHG

## 2022-04-05 VITALS
BODY MASS INDEX: 27.31 KG/M2 | DIASTOLIC BLOOD PRESSURE: 48 MMHG | HEART RATE: 93 BPM | HEIGHT: 64 IN | SYSTOLIC BLOOD PRESSURE: 102 MMHG | WEIGHT: 160 LBS

## 2022-04-05 DIAGNOSIS — G89.29 CHRONIC ELBOW PAIN, LEFT: ICD-10-CM

## 2022-04-05 DIAGNOSIS — E55.9 VITAMIN D DEFICIENCY: ICD-10-CM

## 2022-04-05 DIAGNOSIS — M25.631 DECREASED RANGE OF MOTION OF RIGHT WRIST: Primary | ICD-10-CM

## 2022-04-05 DIAGNOSIS — G56.22 NEURITIS OF LEFT ULNAR NERVE: ICD-10-CM

## 2022-04-05 DIAGNOSIS — M79.641 RIGHT HAND PAIN: ICD-10-CM

## 2022-04-05 DIAGNOSIS — M25.521 BILATERAL ELBOW JOINT PAIN: ICD-10-CM

## 2022-04-05 DIAGNOSIS — G56.03 BILATERAL CARPAL TUNNEL SYNDROME: ICD-10-CM

## 2022-04-05 DIAGNOSIS — M79.641 BILATERAL HAND PAIN: ICD-10-CM

## 2022-04-05 DIAGNOSIS — M77.11 LATERAL EPICONDYLITIS OF BOTH ELBOWS: ICD-10-CM

## 2022-04-05 DIAGNOSIS — M54.2 NECK PAIN: ICD-10-CM

## 2022-04-05 DIAGNOSIS — M25.522 BILATERAL ELBOW JOINT PAIN: ICD-10-CM

## 2022-04-05 DIAGNOSIS — M54.50 CHRONIC BILATERAL LOW BACK PAIN WITHOUT SCIATICA: Primary | ICD-10-CM

## 2022-04-05 DIAGNOSIS — R12 HEARTBURN: ICD-10-CM

## 2022-04-05 DIAGNOSIS — M79.642 BILATERAL HAND PAIN: ICD-10-CM

## 2022-04-05 DIAGNOSIS — M77.12 LATERAL EPICONDYLITIS OF BOTH ELBOWS: ICD-10-CM

## 2022-04-05 DIAGNOSIS — M79.644 PAIN OF RIGHT THUMB: ICD-10-CM

## 2022-04-05 DIAGNOSIS — M25.522 CHRONIC ELBOW PAIN, LEFT: ICD-10-CM

## 2022-04-05 DIAGNOSIS — G89.29 CHRONIC BILATERAL LOW BACK PAIN WITHOUT SCIATICA: Primary | ICD-10-CM

## 2022-04-05 PROCEDURE — 3008F BODY MASS INDEX DOCD: CPT | Performed by: NURSE PRACTITIONER

## 2022-04-05 PROCEDURE — 99214 OFFICE O/P EST MOD 30 MIN: CPT | Performed by: NURSE PRACTITIONER

## 2022-04-05 PROCEDURE — 3074F SYST BP LT 130 MM HG: CPT | Performed by: NURSE PRACTITIONER

## 2022-04-05 PROCEDURE — 3078F DIAST BP <80 MM HG: CPT | Performed by: NURSE PRACTITIONER

## 2022-04-05 RX ORDER — PREGABALIN 75 MG/1
75 CAPSULE ORAL 2 TIMES DAILY
Qty: 60 CAPSULE | Refills: 2 | Status: SHIPPED | OUTPATIENT
Start: 2022-04-05

## 2022-04-05 RX ORDER — OMEPRAZOLE 20 MG/1
20 CAPSULE, DELAYED RELEASE ORAL
Qty: 30 CAPSULE | Refills: 3 | Status: SHIPPED | OUTPATIENT
Start: 2022-04-05

## 2022-04-05 RX ORDER — BIOTIN 1 MG
1 TABLET ORAL DAILY
Qty: 100 CAPSULE | Refills: 1 | Status: SHIPPED | OUTPATIENT
Start: 2022-04-05

## 2022-04-05 NOTE — TELEPHONE ENCOUNTER
S/w pt to clarify. She states she never used the 1/19/22 PT order. Needs order changed to OT for Medial epicondylitis of left arm. Ok to change?

## 2022-04-05 NOTE — ASSESSMENT & PLAN NOTE
Is follow up with additional surgeries for pain  Refill lyrica  Is hopeful that her pain will continue to improve as it has w right elbow sx

## 2022-04-05 NOTE — TELEPHONE ENCOUNTER
Called emi ISBELL not in today. S/w Carlos and informed him of sx. He was unaware of any further q's that Tracy Recinos had and will have her CB if needed.

## 2022-04-05 NOTE — TELEPHONE ENCOUNTER
From: Chip Araiza  To: Chen Zhou MD  Sent: 4/4/2022 4:55 PM CDT  Subject: Referral Correction.     Can you please correct referral# [id number] from physical therapy to occupational therapist, once corrected please fax it over to : [phone]

## 2022-04-05 NOTE — ASSESSMENT & PLAN NOTE
Start omeparzole 20 mg daily  Discussed diet, positioning  Please call if symptoms worsen or are not resolving.

## 2022-04-06 NOTE — TELEPHONE ENCOUNTER
Was speaking with patient who stated she has been having a flare up of pain in her right foot. Pain is in the right great toe and is now radiating into the right heel. Patient c/o pressure pain in the heel, \"sand in joint\" and sometimes has numbness and tingling. Patient states this started about 3 days ago. Patient currently scheduled for RIGHT great toe CSI under ultrasound guidance with Dr.Behar on 4/12/22. States she spoke with her surgeon  who does not want her to get this injection yet as he thinks the steroid may interfere with the surgery she just had and she also stated she will need to have right CTR in about 6 weeks and another procedure on her left elbow in a couple months. Patient asked for the upcoming injection to be cancelled and she would like a referral to PT for her right foot pain. Update forwarded on to Dr.Behar for recommendations and PT order if appropriate.

## 2022-04-07 NOTE — TELEPHONE ENCOUNTER
Okay to cancel injection. Order placed for physical therapy. She should follow up with me about 6 weeks after her carpal tunnel surgery. Baudilio Tong.  Trenna Barthel MD, [facility]  Physical Medicine and Rehabilitation/Sports Medicine  MEDICAL CENTER [facility]

## 2022-04-07 NOTE — TELEPHONE ENCOUNTER
Message below noted. Injection was cancelled. Message sent to patient via [employer].

## 2022-04-13 ENCOUNTER — PATIENT MESSAGE (OUTPATIENT)
Dept: ORTHOPEDICS CLINIC | Facility: CLINIC | Age: 42
End: 2022-04-13

## 2022-04-13 ENCOUNTER — TELEPHONE (OUTPATIENT)
Dept: ORTHOPEDICS CLINIC | Facility: CLINIC | Age: 42
End: 2022-04-13

## 2022-04-13 ENCOUNTER — OFFICE VISIT (OUTPATIENT)
Dept: ORTHOPEDICS CLINIC | Facility: CLINIC | Age: 42
End: 2022-04-13
Payer: MEDICAID

## 2022-04-13 DIAGNOSIS — M77.11 LATERAL EPICONDYLITIS OF RIGHT ELBOW: Primary | ICD-10-CM

## 2022-04-13 PROCEDURE — 99024 POSTOP FOLLOW-UP VISIT: CPT | Performed by: ORTHOPAEDIC SURGERY

## 2022-04-13 RX ORDER — BIOTIN 1 MG
1 TABLET ORAL DAILY
Qty: 60 CAPSULE | Refills: 0 | Status: SHIPPED | OUTPATIENT
Start: 2022-04-13 | End: 2022-05-13

## 2022-04-14 ENCOUNTER — MED REC SCAN ONLY (OUTPATIENT)
Dept: PHYSICAL MEDICINE AND REHAB | Facility: CLINIC | Age: 42
End: 2022-04-14

## 2022-04-14 ENCOUNTER — OFFICE VISIT (OUTPATIENT)
Dept: PHYSICAL THERAPY | Age: 42
End: 2022-04-14
Attending: PHYSICAL MEDICINE & REHABILITATION
Payer: MEDICAID

## 2022-04-14 ENCOUNTER — TELEPHONE (OUTPATIENT)
Dept: ORTHOPEDICS CLINIC | Facility: CLINIC | Age: 42
End: 2022-04-14

## 2022-04-14 DIAGNOSIS — R20.0 NUMBNESS IN BOTH HANDS: ICD-10-CM

## 2022-04-14 DIAGNOSIS — M19.071 OSTEOARTHRITIS OF TOE JOINT, RIGHT: ICD-10-CM

## 2022-04-14 DIAGNOSIS — M79.671 RIGHT FOOT PAIN: ICD-10-CM

## 2022-04-14 DIAGNOSIS — M79.674 CHRONIC TOE PAIN, RIGHT FOOT: ICD-10-CM

## 2022-04-14 DIAGNOSIS — G89.29 CHRONIC TOE PAIN, RIGHT FOOT: ICD-10-CM

## 2022-04-14 PROCEDURE — 97140 MANUAL THERAPY 1/> REGIONS: CPT | Performed by: PHYSICAL THERAPIST

## 2022-04-14 PROCEDURE — 97161 PT EVAL LOW COMPLEX 20 MIN: CPT | Performed by: PHYSICAL THERAPIST

## 2022-04-14 PROCEDURE — 97110 THERAPEUTIC EXERCISES: CPT | Performed by: PHYSICAL THERAPIST

## 2022-04-14 NOTE — TELEPHONE ENCOUNTER
Asking if protocol can be faxed over regarding recent surgery - noting any restrictions - asking when they can start passive range of motion - fax [phone]

## 2022-04-16 ENCOUNTER — HOSPITAL ENCOUNTER (OUTPATIENT)
Age: 42
Discharge: HOME | End: 2022-04-16
Payer: MEDICAID

## 2022-04-16 ENCOUNTER — PATIENT MESSAGE (OUTPATIENT)
Dept: ORTHOPEDICS CLINIC | Facility: CLINIC | Age: 42
End: 2022-04-16

## 2022-04-16 ENCOUNTER — APPOINTMENT (OUTPATIENT)
Dept: CT IMAGING | Age: 42
End: 2022-04-16
Payer: MEDICAID

## 2022-04-16 VITALS
BODY MASS INDEX: 28.68 KG/M2 | TEMPERATURE: 98 F | DIASTOLIC BLOOD PRESSURE: 96 MMHG | HEART RATE: 77 BPM | RESPIRATION RATE: 18 BRPM | HEIGHT: 64 IN | SYSTOLIC BLOOD PRESSURE: 139 MMHG | WEIGHT: 168 LBS | OXYGEN SATURATION: 99 %

## 2022-04-16 DIAGNOSIS — N12 PYELONEPHRITIS: ICD-10-CM

## 2022-04-16 DIAGNOSIS — R10.9 FLANK PAIN: Primary | ICD-10-CM

## 2022-04-16 LAB
#MXD IC: 0.5 X10ˆ3/UL (ref 0.1–1)
B-HCG UR QL: NEGATIVE
BILIRUB UR QL STRIP: NEGATIVE
BUN BLD-MCNC: 10 MG/DL (ref 7–18)
CHLORIDE BLD-SCNC: 103 MMOL/L (ref 98–112)
CLARITY UR: CLEAR
CO2 BLD-SCNC: 24 MMOL/L (ref 21–32)
COLOR UR: YELLOW
CREAT BLD-MCNC: 0.5 MG/DL
GLUCOSE BLD-MCNC: 89 MG/DL (ref 70–99)
GLUCOSE UR STRIP-MCNC: NEGATIVE MG/DL
HCT VFR BLD AUTO: 37.5 %
HCT VFR BLD CALC: 41 %
HGB BLD-MCNC: 12.2 G/DL
ISTAT IONIZED CALCIUM FOR CHEM 8: 1.18 MMOL/L (ref 1.12–1.32)
KETONES UR STRIP-MCNC: NEGATIVE MG/DL
LEUKOCYTE ESTERASE UR QL STRIP: NEGATIVE
LYMPHOCYTES # BLD AUTO: 3.8 X10ˆ3/UL (ref 1–4)
LYMPHOCYTES NFR BLD AUTO: 49.2 %
MCH RBC QN AUTO: 27.6 PG (ref 26–34)
MCHC RBC AUTO-ENTMCNC: 32.5 G/DL (ref 31–37)
MCV RBC AUTO: 84.8 FL (ref 80–100)
MIXED CELL %: 6.8 %
NEUTROPHILS # BLD AUTO: 3.5 X10ˆ3/UL (ref 1.5–7.7)
NEUTROPHILS NFR BLD AUTO: 44 %
NITRITE UR QL STRIP: NEGATIVE
PH UR STRIP: 5 [PH]
PLATELET # BLD AUTO: 362 X10ˆ3/UL (ref 150–450)
POTASSIUM BLD-SCNC: 3.8 MMOL/L (ref 3.6–5.1)
PROT UR STRIP-MCNC: NEGATIVE MG/DL
RBC # BLD AUTO: 4.42 X10ˆ6/UL
SODIUM BLD-SCNC: 138 MMOL/L (ref 136–145)
SP GR UR STRIP: 1.02
UROBILINOGEN UR STRIP-ACNC: <2 MG/DL
WBC # BLD AUTO: 7.8 X10ˆ3/UL (ref 4–11)

## 2022-04-16 PROCEDURE — 81025 URINE PREGNANCY TEST: CPT

## 2022-04-16 PROCEDURE — 74176 CT ABD & PELVIS W/O CONTRAST: CPT

## 2022-04-16 PROCEDURE — 85025 COMPLETE CBC W/AUTO DIFF WBC: CPT

## 2022-04-16 PROCEDURE — 36415 COLL VENOUS BLD VENIPUNCTURE: CPT

## 2022-04-16 PROCEDURE — 81002 URINALYSIS NONAUTO W/O SCOPE: CPT

## 2022-04-16 PROCEDURE — 99214 OFFICE O/P EST MOD 30 MIN: CPT

## 2022-04-16 PROCEDURE — 80047 BASIC METABLC PNL IONIZED CA: CPT

## 2022-04-16 RX ORDER — FLUCONAZOLE 150 MG/1
150 TABLET ORAL ONCE
Qty: 1 TABLET | Refills: 0 | Status: SHIPPED | OUTPATIENT
Start: 2022-04-16 | End: 2022-04-16

## 2022-04-16 RX ORDER — SULFAMETHOXAZOLE AND TRIMETHOPRIM 800; 160 MG/1; MG/1
1 TABLET ORAL 2 TIMES DAILY
Qty: 20 TABLET | Refills: 0 | Status: SHIPPED | OUTPATIENT
Start: 2022-04-16 | End: 2022-04-26

## 2022-04-16 NOTE — ED INITIAL ASSESSMENT (HPI)
Painful with urination with itching and hematuria started 2 days that getting worse. With fever. No injury no trauma.  With urinary urgency

## 2022-04-16 NOTE — ED QUICK NOTES
Pt. To go to [address] [place] [address] for Outpatient Care per car with instruction given.  Verbalized understanding

## 2022-04-20 ENCOUNTER — TELEPHONE (OUTPATIENT)
Dept: FAMILY MEDICINE CLINIC | Facility: CLINIC | Age: 42
End: 2022-04-20

## 2022-04-20 ENCOUNTER — OFFICE VISIT (OUTPATIENT)
Dept: PHYSICAL THERAPY | Age: 42
End: 2022-04-20
Attending: PHYSICAL MEDICINE & REHABILITATION
Payer: MEDICAID

## 2022-04-20 DIAGNOSIS — M19.071 OSTEOARTHRITIS OF TOE JOINT, RIGHT: ICD-10-CM

## 2022-04-20 DIAGNOSIS — M79.674 CHRONIC TOE PAIN, RIGHT FOOT: ICD-10-CM

## 2022-04-20 DIAGNOSIS — R20.0 NUMBNESS IN BOTH HANDS: ICD-10-CM

## 2022-04-20 DIAGNOSIS — M79.671 RIGHT FOOT PAIN: ICD-10-CM

## 2022-04-20 DIAGNOSIS — G89.29 CHRONIC TOE PAIN, RIGHT FOOT: ICD-10-CM

## 2022-04-20 PROCEDURE — 97110 THERAPEUTIC EXERCISES: CPT | Performed by: PHYSICAL THERAPIST

## 2022-04-20 NOTE — TELEPHONE ENCOUNTER
Patient would like to speak to a nurse in regards to a referral received for [employer]   Please advs

## 2022-04-20 NOTE — PROGRESS NOTES
Diagnosis: Osteoarthritis of toe joint, right (M19.071)  Chronic toe pain, right foot (M79.674,G89.29)  Numbness in both hands (R20.0)  Right foot pain (M79.671)        Next MD visit: none scheduled  Fall Risk: standard         Precautions: n/a          Medication Changes since last visit?: No      Subjective: Patient complains of R foot soreness. Pt describes pain level 8/10. Objective:  (+) tightness B calf     Assessment: Demonstrates improved overall mobility      Plan: Continue PT to relieve pain and improve mobility.      4/20/2022  Visit#: 2/10 until 6/2022 [facility]   Thera Ex   x40min  - calf stretch on B LE's   - B heel raise  - manual PT  - R ankle DF/PF cw circles, ccw circles 20x  - Shuttle B LE extensions with rocker board 10 x 3 x 6 bands  - Shuttle single LE extension with rocker board 10 x 2 x 6 bands  - red theraband resisted LE abduction 10 x 2     Manual PT   x15  - soft tissue mobilization over R forefoot  - ankle and forefoot mobilization                     Charges: EX3       Total Timed Treatment: 40 min  Total Treatment Time: 45 min

## 2022-04-21 ENCOUNTER — OFFICE VISIT (OUTPATIENT)
Dept: PHYSICAL THERAPY | Age: 42
End: 2022-04-21
Attending: PHYSICAL MEDICINE & REHABILITATION
Payer: MEDICAID

## 2022-04-21 PROCEDURE — 97110 THERAPEUTIC EXERCISES: CPT | Performed by: PHYSICAL THERAPIST

## 2022-04-21 PROCEDURE — 97140 MANUAL THERAPY 1/> REGIONS: CPT | Performed by: PHYSICAL THERAPIST

## 2022-04-21 NOTE — PROGRESS NOTES
Diagnosis: Osteoarthritis of toe joint, right (M19.071)  Chronic toe pain, right foot (M79.674,G89.29)  Numbness in both hands (R20.0)  Right foot pain (M79.671)        Next MD visit: none scheduled  Fall Risk: standard         Precautions: n/a          Medication Changes since last visit?: No      Subjective: Patient complains of soreness at the bottom of R foot. Pt describes pain level 8/10. Objective:  (+) tightness B calf     Assessment: Demonstrates improved overall mobility      Plan: Continue PT to relieve pain and improve mobility.      4/21/2022  Visit#: 3/10 until 6/2022 [facility] 4/20/2022  Visit#: 2/10 until 6/2022 [facility]   Thera Ex   x40min  - calf stretch on slant board 1min x 3  - B heel raise on slant board 10 x 2  - manual PT  - R ankle DF/PF cw circles, ccw circles 20x  - Shuttle B LE extensions with rocker board 10 x 3 x 5 bands  - Shuttle single LE extension with rocker board 10 x 2 x 4-5 bands  - red theraband resisted LE abduction 10 x 2 x40min  - calf stretch on B LE's   - B heel raise  - manual PT  - R ankle DF/PF cw circles, ccw circles 20x  - Shuttle B LE extensions with rocker board 10 x 3 x 6 bands  - Shuttle single LE extension with rocker board 10 x 2 x 6 bands  - red theraband resisted LE abduction 10 x 2     Manual PT   x15min  - soft tissue mobilization over R forefoot  - ankle and forefoot mobilization x15  - soft tissue mobilization over R forefoot  - ankle and forefoot mobilization                       Charges: EX3, Manual PT1      Total Timed Treatment: 55 min  Total Treatment Time: 55 min

## 2022-04-22 ENCOUNTER — VIRTUAL PHONE E/M (OUTPATIENT)
Dept: FAMILY MEDICINE CLINIC | Facility: CLINIC | Age: 42
End: 2022-04-22
Payer: MEDICAID

## 2022-04-22 DIAGNOSIS — M54.50 CHRONIC BILATERAL LOW BACK PAIN WITHOUT SCIATICA: ICD-10-CM

## 2022-04-22 DIAGNOSIS — M54.2 NECK PAIN, BILATERAL POSTERIOR: ICD-10-CM

## 2022-04-22 DIAGNOSIS — M79.644 PAIN OF RIGHT THUMB: ICD-10-CM

## 2022-04-22 DIAGNOSIS — G44.209 TENSION HEADACHE: Primary | ICD-10-CM

## 2022-04-22 DIAGNOSIS — G56.03 BILATERAL CARPAL TUNNEL SYNDROME: ICD-10-CM

## 2022-04-22 DIAGNOSIS — G89.29 CHRONIC BILATERAL LOW BACK PAIN WITHOUT SCIATICA: ICD-10-CM

## 2022-04-22 DIAGNOSIS — G89.29 CHRONIC ELBOW PAIN, LEFT: ICD-10-CM

## 2022-04-22 DIAGNOSIS — M25.522 CHRONIC ELBOW PAIN, LEFT: ICD-10-CM

## 2022-04-22 DIAGNOSIS — G56.22 NEURITIS OF LEFT ULNAR NERVE: ICD-10-CM

## 2022-04-22 DIAGNOSIS — M79.641 RIGHT HAND PAIN: ICD-10-CM

## 2022-04-22 PROCEDURE — 99214 OFFICE O/P EST MOD 30 MIN: CPT | Performed by: FAMILY MEDICINE

## 2022-04-22 RX ORDER — NORTRIPTYLINE HYDROCHLORIDE 10 MG/1
10 CAPSULE ORAL NIGHTLY
Qty: 60 CAPSULE | Refills: 2 | Status: SHIPPED | OUTPATIENT
Start: 2022-04-22

## 2022-04-22 RX ORDER — PREGABALIN 150 MG/1
150 CAPSULE ORAL 2 TIMES DAILY
Qty: 60 CAPSULE | Refills: 1 | Status: SHIPPED | OUTPATIENT
Start: 2022-04-22

## 2022-04-25 ENCOUNTER — OFFICE VISIT (OUTPATIENT)
Dept: PHYSICAL THERAPY | Age: 42
End: 2022-04-25
Attending: PHYSICAL MEDICINE & REHABILITATION
Payer: MEDICAID

## 2022-04-25 PROCEDURE — 97110 THERAPEUTIC EXERCISES: CPT | Performed by: PHYSICAL THERAPIST

## 2022-04-25 PROCEDURE — 97140 MANUAL THERAPY 1/> REGIONS: CPT | Performed by: PHYSICAL THERAPIST

## 2022-04-25 NOTE — PROGRESS NOTES
Diagnosis: Osteoarthritis of toe joint, right (M19.071)  Chronic toe pain, right foot (M79.674,G89.29)  Numbness in both hands (R20.0)  Right foot pain (M79.671)        Next MD visit: none scheduled  Fall Risk: standard         Precautions: n/a          Medication Changes since last visit?: No      Subjective: Patient states her foot is feeling better  Pt describes pain level 8/10. Objective:  (+) tightness B calf     Assessment: Demonstrates improved overall mobility      Plan: Continue PT to relieve pain and improve mobility.      4/21/2022  Visit#: 4/10 until 6/2022 [facility] 4/21/2022  Visit#: 3/10 until 6/2022 [facility] 4/20/2022  Visit#: 2/10 until 6/2022 [facility]   Thera Ex   x40min  - calf stretch on slant board 1min x 3  - B heel raise on slant board 10 x 2  - manual PT  - R ankle DF/PF cw circles, ccw circles 20x  - Shuttle B LE extensions with rocker board 10 x 3 x 5 bands  - Shuttle single LE extension with rocker board 10 x 2 x 4-5 bands  - red theraband resisted LE abduction 10 x 2  - alternate forward lunges on stairs 20x   x40min  - calf stretch on slant board 1min x 3  - B heel raise on slant board 10 x 2  - manual PT  - R ankle DF/PF cw circles, ccw circles 20x  - Shuttle B LE extensions with rocker board 10 x 3 x 5 bands  - Shuttle single LE extension with rocker board 10 x 2 x 4-5 bands  - red theraband resisted LE abduction 10 x 2 x40min  - calf stretch on B LE's   - B heel raise  - manual PT  - R ankle DF/PF cw circles, ccw circles 20x  - Shuttle B LE extensions with rocker board 10 x 3 x 6 bands  - Shuttle single LE extension with rocker board 10 x 2 x 6 bands  - red theraband resisted LE abduction 10 x 2     Manual PT   x15min  - soft tissue mobilization over R forefoot  - ankle and forefoot mobilization x15min  - soft tissue mobilization over R forefoot  - ankle and forefoot mobilization x15  - soft tissue mobilization over R forefoot  - ankle and forefoot mobilization Charges: EX3, Manual PT1      Total Timed Treatment: 55 min  Total Treatment Time: 55 min

## 2022-04-25 NOTE — TELEPHONE ENCOUNTER
lvm to call back to get details regarding forms. Informed pt of turn around time in vm as well. Need to ask if forms are for brain or o'noemi.

## 2022-04-26 ENCOUNTER — TELEPHONE (OUTPATIENT)
Dept: PHYSICAL THERAPY | Facility: HOSPITAL | Age: 42
End: 2022-04-26

## 2022-04-26 NOTE — TELEPHONE ENCOUNTER
Dr. Wenceslao Majano,     Please sign off on form: Disab  -Highlight the patient and hit \"Chart\" button. -In Chart Review, w/in the Encounter tab - click 1 time on the Telephone call encounter for 4/13/22 Scroll down the telephone encounter.  -Click \"scan on\" blue Hyperlink under \"Media\" heading for Disab Dr Wenceslao Majano 4/26/22 w/in the telephone enc.  -Click on Acknowledge button at the bottom right corner and left-click onto image, signature stamp appears and drag signature to Provider signature line. Stamp will turn blue. Close window. Thank you,    Germán Brandon.

## 2022-04-26 NOTE — TELEPHONE ENCOUNTER
Pt stated Disability is for surgery (Dr Henna Walker) she had on her right arm.  First day pt had off of work 02/28/22  And off pending recovery

## 2022-05-02 ENCOUNTER — OFFICE VISIT (OUTPATIENT)
Dept: PHYSICAL THERAPY | Age: 42
End: 2022-05-02
Attending: PHYSICAL MEDICINE & REHABILITATION
Payer: MEDICAID

## 2022-05-02 PROCEDURE — 97140 MANUAL THERAPY 1/> REGIONS: CPT | Performed by: PHYSICAL THERAPIST

## 2022-05-02 PROCEDURE — 97110 THERAPEUTIC EXERCISES: CPT | Performed by: PHYSICAL THERAPIST

## 2022-05-04 ENCOUNTER — MED REC SCAN ONLY (OUTPATIENT)
Dept: PHYSICAL MEDICINE AND REHAB | Facility: CLINIC | Age: 42
End: 2022-05-04

## 2022-05-04 ENCOUNTER — OFFICE VISIT (OUTPATIENT)
Dept: ORTHOPEDICS CLINIC | Facility: CLINIC | Age: 42
End: 2022-05-04

## 2022-05-04 ENCOUNTER — APPOINTMENT (OUTPATIENT)
Dept: PHYSICAL THERAPY | Age: 42
End: 2022-05-04
Attending: PHYSICAL MEDICINE & REHABILITATION
Payer: MEDICAID

## 2022-05-04 DIAGNOSIS — M77.02 MEDIAL EPICONDYLITIS OF LEFT ELBOW: ICD-10-CM

## 2022-05-04 DIAGNOSIS — M77.11 LATERAL EPICONDYLITIS OF RIGHT ELBOW: Primary | ICD-10-CM

## 2022-05-05 NOTE — TELEPHONE ENCOUNTER
Copied from Andres message:    Good morning I forgot to ask the doctor for a work status letter, (please see the letter dated 4/14/2022 for reference) it needs to say stay off work, pending recovery of both arms, the next follow-up is in 4-5 weeks. Letter pended under communications. Please sign off if appropriate.

## 2022-05-09 ENCOUNTER — TELEPHONE (OUTPATIENT)
Dept: ORTHOPEDICS CLINIC | Facility: CLINIC | Age: 42
End: 2022-05-09

## 2022-05-09 ENCOUNTER — OFFICE VISIT (OUTPATIENT)
Dept: PHYSICAL THERAPY | Age: 42
End: 2022-05-09
Attending: PHYSICAL MEDICINE & REHABILITATION
Payer: MEDICAID

## 2022-05-09 PROCEDURE — 97140 MANUAL THERAPY 1/> REGIONS: CPT | Performed by: PHYSICAL THERAPIST

## 2022-05-09 PROCEDURE — 97110 THERAPEUTIC EXERCISES: CPT | Performed by: PHYSICAL THERAPIST

## 2022-05-09 NOTE — TELEPHONE ENCOUNTER
Please see below message from pt- she states that the 2lb weights OT is using is causing pain, and she wants you to put in a restriction for her to not use weights at OT.

## 2022-05-09 NOTE — TELEPHONE ENCOUNTER
S/w Elliott Collet at [facility] and she wanted to   1. Request PT order be changed to OT for right elbow only, as they are not treating her for left elbow since they did 27 visits last year for left elbow and pt didn't make progress. 2. She states the right elbow is not improving and if anything is slight worse. Last 3 sessions pt refuses to try anything that causes any discomfort, self-limiting. She has been limited to very gentle P/AROM. She tried to do 2lb wrist extension exercises and pt c/o pain and did not want to continue. Please advise ok to change to OT, any concerns for OT? Also please see pt message below about wanting restrictions of no weights in OT?

## 2022-05-09 NOTE — TELEPHONE ENCOUNTER
Pt states PT has been using weight while in PT states was told by MD that she shouldn't use weights states was told by PT therapist that MD needs to send over instructions of restrictions please advise     Fax # [phone] [facility]

## 2022-05-09 NOTE — TELEPHONE ENCOUNTER
S/w pt and she states she called last week to mention that she was doing weights in PT(not more than 2lbs) and it's causing pain and she would like to have Dr. Brenna Snow write restrictions of not doing weights in PT. In the meantime she wants current 5/4 PT order faxed to [facility]. Order faxed. Please advise on weights?

## 2022-05-09 NOTE — TELEPHONE ENCOUNTER
Per Jessi OT, calling to clarify order they received. States pt is not being treated for those issues. Would also like to discuss lack of progress. Kathrin Virgen will not be in tomorrow, if not able to return call today then reach out on 5/11/22.  Please advise

## 2022-05-09 NOTE — TELEPHONE ENCOUNTER
Patient indicates she is at her appointment now and they are awaiting fax. Please send as soon as possible, thanks.

## 2022-05-10 NOTE — TELEPHONE ENCOUNTER
Pt notified that new OT order faxed to PT with restriction of no weights. She had no further concerns.

## 2022-05-11 ENCOUNTER — OFFICE VISIT (OUTPATIENT)
Dept: PHYSICAL THERAPY | Age: 42
End: 2022-05-11
Attending: PHYSICAL MEDICINE & REHABILITATION
Payer: MEDICAID

## 2022-05-11 PROCEDURE — 97110 THERAPEUTIC EXERCISES: CPT | Performed by: PHYSICAL THERAPIST

## 2022-05-11 PROCEDURE — 97140 MANUAL THERAPY 1/> REGIONS: CPT | Performed by: PHYSICAL THERAPIST

## 2022-05-13 ENCOUNTER — HOSPITAL ENCOUNTER (EMERGENCY)
Facility: HOSPITAL | Age: 42
Discharge: HOME | End: 2022-05-13
Attending: EMERGENCY MEDICINE
Payer: MEDICAID

## 2022-05-13 ENCOUNTER — APPOINTMENT (OUTPATIENT)
Dept: GENERAL RADIOLOGY | Facility: HOSPITAL | Age: 42
End: 2022-05-13
Attending: EMERGENCY MEDICINE
Payer: MEDICAID

## 2022-05-13 ENCOUNTER — TELEPHONE (OUTPATIENT)
Dept: ORTHOPEDICS CLINIC | Facility: CLINIC | Age: 42
End: 2022-05-13

## 2022-05-13 VITALS
DIASTOLIC BLOOD PRESSURE: 79 MMHG | HEIGHT: 64 IN | BODY MASS INDEX: 28.68 KG/M2 | WEIGHT: 168 LBS | TEMPERATURE: 99 F | SYSTOLIC BLOOD PRESSURE: 122 MMHG | HEART RATE: 87 BPM | RESPIRATION RATE: 18 BRPM | OXYGEN SATURATION: 97 %

## 2022-05-13 DIAGNOSIS — T81.41XA ABSCESS INVOLVING SUTURE: Primary | ICD-10-CM

## 2022-05-13 PROCEDURE — 99283 EMERGENCY DEPT VISIT LOW MDM: CPT

## 2022-05-13 PROCEDURE — 73080 X-RAY EXAM OF ELBOW: CPT | Performed by: EMERGENCY MEDICINE

## 2022-05-13 RX ORDER — CEPHALEXIN 500 MG/1
500 CAPSULE ORAL 4 TIMES DAILY
Qty: 40 CAPSULE | Refills: 0 | Status: SHIPPED | OUTPATIENT
Start: 2022-05-13 | End: 2022-05-23

## 2022-05-13 NOTE — ED INITIAL ASSESSMENT (HPI)
Patient states she had surgery on her R elbow about 4 weeks ago. Patient states she is here today because pus was draining from the incision site 2 days ago. Patient had fever starting yesterday.  Patient took tylenol around 11am.

## 2022-05-13 NOTE — TELEPHONE ENCOUNTER
Pt came to  with c/o white drainage coming from proximal incision on 5/12/22 and tender to touch. Currently no visible drainage, warmth, redness. No fever or chills. Small bump at proximal incision. Pt states she had some leftover penicillin at home that she took. RN advised she shouldn't take leftover antibiotics. Per Dr. Brenna Snow if she has concerns she can be evaluated at ER or schedule f/u appt. S/w pt and she states she will go to ER. Also pt wants to add additional dx to her OT order- Left CTS and Right wrist pain. Pt last seen for left CTS on 1/19/22 & Right CTS on 11/5/21. Ok to add dx's to order?

## 2022-05-13 NOTE — TELEPHONE ENCOUNTER
OT has communicated that extensive therapy has been performed for LUE and right carpal tunnel symptoms. No improvement. No further OT on these complaints is indicated. Continue therapy for R elbow.

## 2022-05-17 ENCOUNTER — APPOINTMENT (OUTPATIENT)
Dept: PHYSICAL THERAPY | Age: 42
End: 2022-05-17
Attending: PHYSICAL MEDICINE & REHABILITATION
Payer: MEDICAID

## 2022-05-18 ENCOUNTER — TELEPHONE (OUTPATIENT)
Dept: ORTHOPEDICS CLINIC | Facility: CLINIC | Age: 42
End: 2022-05-18

## 2022-05-19 ENCOUNTER — LAB ENCOUNTER (OUTPATIENT)
Dept: LAB | Facility: HOSPITAL | Age: 42
End: 2022-05-19
Attending: ORTHOPAEDIC SURGERY
Payer: MEDICAID

## 2022-05-19 ENCOUNTER — OFFICE VISIT (OUTPATIENT)
Dept: ORTHOPEDICS CLINIC | Facility: CLINIC | Age: 42
End: 2022-05-19
Payer: MEDICAID

## 2022-05-19 DIAGNOSIS — M77.11 LATERAL EPICONDYLITIS OF RIGHT ELBOW: Primary | ICD-10-CM

## 2022-05-19 DIAGNOSIS — T81.41XA ABSCESS INVOLVING SUTURE: ICD-10-CM

## 2022-05-19 DIAGNOSIS — M77.11 LATERAL EPICONDYLITIS OF RIGHT ELBOW: ICD-10-CM

## 2022-05-19 LAB
BASOPHILS # BLD AUTO: 0.04 X10(3) UL (ref 0–0.2)
BASOPHILS NFR BLD AUTO: 0.5 %
CRP SERPL-MCNC: 0.93 MG/DL (ref ?–0.3)
DEPRECATED RDW RBC AUTO: 44.5 FL (ref 35.1–46.3)
EOSINOPHIL # BLD AUTO: 0.28 X10(3) UL (ref 0–0.7)
EOSINOPHIL NFR BLD AUTO: 3.3 %
ERYTHROCYTE [DISTWIDTH] IN BLOOD BY AUTOMATED COUNT: 13.9 % (ref 11–15)
ERYTHROCYTE [SEDIMENTATION RATE] IN BLOOD: 18 MM/HR
HCT VFR BLD AUTO: 40.6 %
HGB BLD-MCNC: 12.9 G/DL
IMM GRANULOCYTES # BLD AUTO: 0.02 X10(3) UL (ref 0–1)
IMM GRANULOCYTES NFR BLD: 0.2 %
LYMPHOCYTES # BLD AUTO: 3.95 X10(3) UL (ref 1–4)
LYMPHOCYTES NFR BLD AUTO: 46.6 %
MCH RBC QN AUTO: 27.8 PG (ref 26–34)
MCHC RBC AUTO-ENTMCNC: 31.8 G/DL (ref 31–37)
MCV RBC AUTO: 87.5 FL
MONOCYTES # BLD AUTO: 0.47 X10(3) UL (ref 0.1–1)
MONOCYTES NFR BLD AUTO: 5.5 %
NEUTROPHILS # BLD AUTO: 3.71 X10 (3) UL (ref 1.5–7.7)
NEUTROPHILS # BLD AUTO: 3.71 X10(3) UL (ref 1.5–7.7)
NEUTROPHILS NFR BLD AUTO: 43.9 %
PLATELET # BLD AUTO: 471 10(3)UL (ref 150–450)
RBC # BLD AUTO: 4.64 X10(6)UL
WBC # BLD AUTO: 8.5 X10(3) UL (ref 4–11)

## 2022-05-19 PROCEDURE — 85025 COMPLETE CBC W/AUTO DIFF WBC: CPT

## 2022-05-19 PROCEDURE — 99024 POSTOP FOLLOW-UP VISIT: CPT | Performed by: ORTHOPAEDIC SURGERY

## 2022-05-19 PROCEDURE — 85652 RBC SED RATE AUTOMATED: CPT

## 2022-05-19 PROCEDURE — 36415 COLL VENOUS BLD VENIPUNCTURE: CPT

## 2022-05-19 PROCEDURE — 86140 C-REACTIVE PROTEIN: CPT

## 2022-05-23 ENCOUNTER — TELEPHONE (OUTPATIENT)
Dept: ORTHOPEDICS CLINIC | Facility: CLINIC | Age: 42
End: 2022-05-23

## 2022-05-23 ENCOUNTER — HOSPITAL ENCOUNTER (OUTPATIENT)
Dept: GENERAL RADIOLOGY | Age: 42
Discharge: HOME | End: 2022-05-23
Attending: FAMILY MEDICINE
Payer: MEDICAID

## 2022-05-23 ENCOUNTER — VIRTUAL PHONE E/M (OUTPATIENT)
Dept: FAMILY MEDICINE CLINIC | Facility: CLINIC | Age: 42
End: 2022-05-23
Payer: MEDICAID

## 2022-05-23 ENCOUNTER — HOSPITAL ENCOUNTER (OUTPATIENT)
Age: 42
Discharge: HOME | End: 2022-05-23
Payer: MEDICAID

## 2022-05-23 VITALS
HEIGHT: 64 IN | HEART RATE: 83 BPM | SYSTOLIC BLOOD PRESSURE: 140 MMHG | OXYGEN SATURATION: 100 % | DIASTOLIC BLOOD PRESSURE: 85 MMHG | TEMPERATURE: 98 F | WEIGHT: 160 LBS | BODY MASS INDEX: 27.31 KG/M2 | RESPIRATION RATE: 18 BRPM

## 2022-05-23 DIAGNOSIS — R79.82 ELEVATED C-REACTIVE PROTEIN (CRP): ICD-10-CM

## 2022-05-23 DIAGNOSIS — Z51.89 VISIT FOR WOUND CHECK: Primary | ICD-10-CM

## 2022-05-23 DIAGNOSIS — M25.531 CHRONIC PAIN OF RIGHT WRIST: ICD-10-CM

## 2022-05-23 DIAGNOSIS — M77.11 LATERAL EPICONDYLITIS OF RIGHT ELBOW: ICD-10-CM

## 2022-05-23 DIAGNOSIS — N89.8 VAGINA ITCHING: ICD-10-CM

## 2022-05-23 DIAGNOSIS — G89.29 CHRONIC PAIN OF RIGHT WRIST: ICD-10-CM

## 2022-05-23 DIAGNOSIS — M25.531 CHRONIC PAIN OF RIGHT WRIST: Primary | ICD-10-CM

## 2022-05-23 DIAGNOSIS — G89.29 CHRONIC PAIN OF RIGHT WRIST: Primary | ICD-10-CM

## 2022-05-23 PROCEDURE — 73110 X-RAY EXAM OF WRIST: CPT | Performed by: FAMILY MEDICINE

## 2022-05-23 PROCEDURE — 99212 OFFICE O/P EST SF 10 MIN: CPT | Performed by: NURSE PRACTITIONER

## 2022-05-23 RX ORDER — FLUCONAZOLE 150 MG/1
150 TABLET ORAL ONCE
Qty: 1 TABLET | Refills: 0 | Status: SHIPPED | OUTPATIENT
Start: 2022-05-23 | End: 2022-05-23

## 2022-05-23 NOTE — ED INITIAL ASSESSMENT (HPI)
Patient presents a&o 4/4 with complaints of wound from surgery. Surgery x 11 weeks ago. States went to ER on May 13th for s/s infection. Given PO abx, states currently still taking. Endorses symptom improvement.  Here today because of itching sensation around wound, concerned for suture that is \"poking out\" and concerned for another suture that \"mgiht be poking out soon\"

## 2022-05-23 NOTE — TELEPHONE ENCOUNTER
emi from [facility] therapy called. Sent a progress note on 5-16-22. Asking how to advise with frequency and duration. Unable to progress. Please call wednesday. Caller not available on Tuesday.

## 2022-05-24 ENCOUNTER — OFFICE VISIT (OUTPATIENT)
Dept: PHYSICAL THERAPY | Age: 42
End: 2022-05-24
Attending: PHYSICAL MEDICINE & REHABILITATION
Payer: MEDICAID

## 2022-05-24 PROCEDURE — 97140 MANUAL THERAPY 1/> REGIONS: CPT | Performed by: PHYSICAL THERAPIST

## 2022-05-24 PROCEDURE — 97110 THERAPEUTIC EXERCISES: CPT | Performed by: PHYSICAL THERAPIST

## 2022-05-24 NOTE — TELEPHONE ENCOUNTER
Have not rxc'd 5/16 progress note. Called AKILAH and Boy Henriquez out of office today but they will fax over progress report.

## 2022-05-25 ENCOUNTER — ORDER TRANSCRIPTION (OUTPATIENT)
Dept: PHYSICAL THERAPY | Facility: HOSPITAL | Age: 42
End: 2022-05-25

## 2022-05-25 DIAGNOSIS — M77.02 MEDIAL EPICONDYLITIS OF LEFT ELBOW: ICD-10-CM

## 2022-05-25 DIAGNOSIS — M77.11 LATERAL EPICONDYLITIS OF RIGHT ELBOW: Primary | ICD-10-CM

## 2022-05-25 NOTE — TELEPHONE ENCOUNTER
Per Josefa Angel at [facility] pt is discharged as of today from physical therapy due to lack of progress.  Thank you

## 2022-05-25 NOTE — TELEPHONE ENCOUNTER
Please see pt mychart message from today-    From:Geeta Macdonald       Sent:5/25/2022 10:24 AM CDT         To:ERICH Benavides MD    Subject:I need a \"work status\" letter    Good morning please note that [facility] is no longer my physical therapy provider. This is a formal request to revoke all type of communications with [facility].

## 2022-05-26 ENCOUNTER — TELEPHONE (OUTPATIENT)
Dept: PHYSICAL THERAPY | Facility: HOSPITAL | Age: 42
End: 2022-05-26

## 2022-05-26 ENCOUNTER — TELEPHONE (OUTPATIENT)
Dept: ORTHOPEDICS CLINIC | Facility: CLINIC | Age: 42
End: 2022-05-26

## 2022-05-26 DIAGNOSIS — M77.11 LATERAL EPICONDYLITIS OF RIGHT ELBOW: Primary | ICD-10-CM

## 2022-05-30 ENCOUNTER — PATIENT MESSAGE (OUTPATIENT)
Dept: OTHER | Age: 42
End: 2022-05-30

## 2022-05-30 ENCOUNTER — TELEPHONE (OUTPATIENT)
Dept: FAMILY MEDICINE CLINIC | Facility: CLINIC | Age: 42
End: 2022-05-30

## 2022-05-31 ENCOUNTER — OFFICE VISIT (OUTPATIENT)
Dept: PHYSICAL THERAPY | Age: 42
End: 2022-05-31
Attending: ORTHOPAEDIC SURGERY
Payer: MEDICAID

## 2022-05-31 DIAGNOSIS — M77.02 MEDIAL EPICONDYLITIS OF LEFT ELBOW: ICD-10-CM

## 2022-05-31 DIAGNOSIS — M77.11 LATERAL EPICONDYLITIS OF RIGHT ELBOW: ICD-10-CM

## 2022-05-31 PROCEDURE — 97165 OT EVAL LOW COMPLEX 30 MIN: CPT

## 2022-05-31 PROCEDURE — 97110 THERAPEUTIC EXERCISES: CPT

## 2022-05-31 NOTE — PROGRESS NOTES
Insurance (Authorized # of Visits):  Medicaid 5           Authorizing Physician: Dr.O'Connor Anitra JONES visit: 6/22/22  Fall Risk: standard         Precautions:  Per orders         Date of initial eval: 5/31/22  Diagnosis:     Lateral epicondylitis of right elbow (M77.11)    Orders:    Order Date:  5/27/2022  Authorizing Provider:   Mary Holcomb     Procedure:  OCCUPATIONAL THERAPY - INTERNAL [[id number]]         Order #:   [id number]  Qty:  1     Priority:  Routine                   Class:   IHP - RFL     Standing Interval:          Standing Occurrences:          Expires on:            Expected by:    Associated DX:  Lateral epicondylitis of right elbow (M77.11)     Order summary:  IHP - RFL, Routine, 8 visits  Occupational  Therapy Area of Concentration: Orthopedic  Occupational Therapy Ortho: UE  If the patient can be seen sooner with a PT vs an OT, can we cancel this order and replace with a PT order? Yes         Comments:  2-3x/week for 4-6 weeks. Modalities per PT discretion. Region Specific: Elbow  elbow ROM, strengthening, modaltities as atif    Subjective: \"Still with the sharp pain. \"  Patient asking about treatment for LUE. Pain: 4-5/10      Objective: See treatment below. Again explained to patient that current orders are for RUE only and MD's latest note clearly indicates holding any formal therapy to the LUE at this time. Assessment: Patient presents with improvements in movement patterns in the RUE, but still tends to elicit extraneous shoulder movements to protect elbow and distal parts. Some crepitus heard in wrist.  Responds well to the tape, but still at end of session reverts back to abnormal movement pattern of inability to dissociate distal arm from trunk/proximal arm. Goals:  (to be met in 9 visits)  Patient will be independent and compliant with comprehensive HEP to maintain progress achieved in OT.   Patient to verbalize and demonstrate understanding of aggravating and alleviating factors related to lateral epicondylitis to decrease risk of recurrence for improved functional use of right UE. Patient will present with  strength in the right hand to that of 75% of the contralateral hand in order to be able to perform grasp of homemaking tools such as vacuum. Patient will present with increase in Patient Specific Function Scale to 6 or better to represent reported improvement in functional task performance. Patient will present with sufficient ROM and strength in the right hand/arm to return to self care (doing hair) and homemaking (food prep) skill independent performance. Patient will present with increase in force tolerance of gripping with the right hand in the \"stress position\" to 25 # of force with no more than 1/10 pain to allow for ease with gripping housework tools    Plan:   Frequency / Duration: Patient will be seen for 2 x/week or a total of 9 visits over a 90 day period. Treatment will include: See aboveManual Therapy, Mechanical Traction, Neuromuscular Re-education, Self-Care Home Management, Therapeutic Activities, Therapeutic Exercise, Home Exercise Program instruction, Modalities to include: Ultrasound and hot packs and taping    Next session: monitor taping; monitor ECU subluxation; teach scapular stabilization ex, PNF diagonals, roll bulk putty, isometrics to wrist extensors with gripping of putty, wrist proprioceptive re-ed such as tennis ball on frisbee or hand bosu board. Wall washes. Focus on distal stability on proximal mobility and vice versa movement pattern retraining.     Date 5/31/22 6/2/22       Visit # 1/9 2/9       Evaluation Initial  X         Manual           STM   x        MEM    x        IASTM    x       Ther ex            graded isometrics to wrist extensors           PROM, table slides for elbow extension    5x combined with MFR by therapist        weighted stretches into ext of elbow            PNF diagonals            bulk putty  serratus ant ex   Supine, 1#       Lower traps ex   Standing at wall, 10x       Middle traps ex  Standing at wall, 10x        HEP issued See table below         Wrist proprioceptive re-education  Tennis ball on frisbee; hand bosu board seated and standing     Wall washes       PNF diagonals       Functional reach: distal initiation of movement                                        Rock tape applied Start formation over right lateral elbow; I strip to stabilize ECU tendon Star formation over right lateral elbow; I strip to stabilize ECU tendon     modalities Deferred due to pt having just finished antibiotics 3 days ago.  5 minutes hot pack to extensors: lateral elbow and down to wrist     X= performed this date as previously outlined  HEP Date issued  Date amended Date discharged  Comments (HEP2Go code if used)   Nerve glides 5/31/22      Serratus anterior exer:  MODIFIED PLANK PLUS -  Repeat 10 Times, Hold 1 Second(s), Complete 1 Set, Perform 2 Times a Day  SCAPULAR PROTRACTION - SUPINE -  Repeat 10 Times, Hold 1 Second(s), Complete 1 Set, Perform 2 Times a Day 6/2/22   [id number]   Lower traps ex: Wall Lower Trap and Middle Trap lift off -  Repeat 10 Times, Hold 5 Seconds, Complete 2 Sets, Perform 2 Times a Day 6/2/22   [id number]   Middle traps ex: Wall Lower Trap and Middle Trap lift off -  Repeat 10 Times, Hold 5 Seconds, Complete 2 Sets, Perform 2 Times a Day 6/2/22   [id number]   SCAPULAR RETRACTIONS -  Repeat 10 Times, Hold 1 Second(s), Complete 1 Set, Perform 2 Times a Day 6/2/22   [id number]   Chicken Wings -  Repeat 10 Times, Complete 1 Set, Perform 2 Times a Day 6/2/22   [id number]            Charges: 4 TE         Total Timed Treatment: 52 minutes    Total Treatment Time: 57 minutes  Shannan Morgan, MHS, OTR/L, CHT

## 2022-06-01 ENCOUNTER — OFFICE VISIT (OUTPATIENT)
Dept: PHYSICAL THERAPY | Age: 42
End: 2022-06-01
Attending: PHYSICAL MEDICINE & REHABILITATION
Payer: MEDICAID

## 2022-06-01 PROCEDURE — 97140 MANUAL THERAPY 1/> REGIONS: CPT | Performed by: PHYSICAL THERAPIST

## 2022-06-01 PROCEDURE — 97110 THERAPEUTIC EXERCISES: CPT | Performed by: PHYSICAL THERAPIST

## 2022-06-02 ENCOUNTER — TELEPHONE (OUTPATIENT)
Dept: OCCUPATIONAL MEDICINE | Age: 42
End: 2022-06-02

## 2022-06-02 ENCOUNTER — OFFICE VISIT (OUTPATIENT)
Dept: PHYSICAL THERAPY | Age: 42
End: 2022-06-02
Attending: ORTHOPAEDIC SURGERY
Payer: MEDICAID

## 2022-06-02 DIAGNOSIS — M77.02 MEDIAL EPICONDYLITIS OF LEFT ELBOW: ICD-10-CM

## 2022-06-02 DIAGNOSIS — M77.11 LATERAL EPICONDYLITIS OF RIGHT ELBOW: ICD-10-CM

## 2022-06-02 PROCEDURE — 97110 THERAPEUTIC EXERCISES: CPT

## 2022-06-02 NOTE — PROGRESS NOTES
Insurance (Authorized # of Visits):  Medicaid 5           Authorizing Physician: Dr.O'Connor Anitar JONES visit: 6/22/22  Fall Risk: standard         Precautions:  Per orders         Date of initial eval: 5/31/22  Diagnosis:     Lateral epicondylitis of right elbow (M77.11)    Orders:    Order Date:  5/27/2022  Authorizing Provider:   Quintin Sibley     Procedure:  OCCUPATIONAL THERAPY - INTERNAL [[id number]]         Order #:   [id number]  Qty:  1     Priority:  Routine                   Class:   IHP - RFL     Standing Interval:          Standing Occurrences:          Expires on:            Expected by:    Associated DX:  Lateral epicondylitis of right elbow (M77.11)     Order summary:  IHP - RFL, Routine, 8 visits  Occupational  Therapy Area of Concentration: Orthopedic  Occupational Therapy Ortho: UE  If the patient can be seen sooner with a PT vs an OT, can we cancel this order and replace with a PT order? Yes         Comments:  2-3x/week for 4-6 weeks. Modalities per PT discretion. Region Specific: Elbow  elbow ROM, strengthening, modaltities as atif    Subjective:  \"I love it [the tape]. \"  Reports pain \"Because I tried to cut some avocado this morning. I got a sharp pain [pointed to lateral elbow]. \"    Pain:        4/10      Objective: See treatment below. Received with self applied elbow sleeve on. Assessment: Patient presents with persistent snapping at wrist; today on dorsum of hand but not ECU tendon. More normal movement pattern noted in clinic activities, but min cues for automatic reaching still needed. Goals:  (to be met in 9 visits)  Patient will be independent and compliant with comprehensive HEP to maintain progress achieved in OT. (Progressing)  Patient to verbalize and demonstrate understanding of aggravating and alleviating factors related to lateral epicondylitis to decrease risk of recurrence for improved functional use of right UE.  (Progressing)  Patient will present with  strength in the right hand to that of 75% of the contralateral hand in order to be able to perform grasp of homemaking tools such as vacuum. (Progressing)  Patient will present with increase in Patient Specific Function Scale to 6 or better to represent reported improvement in functional task performance. (Progressing)  Patient will present with sufficient ROM and strength in the right hand/arm to return to self care (doing hair) and homemaking (food prep) skill independent performance. (Progressing)  Patient will present with increase in force tolerance of gripping with the right hand in the \"stress position\" to 25 # of force with no more than 1/10 pain to allow for ease with gripping housework tools. (Progressing)    Plan:   Frequency / Duration: Patient will be seen for 2 x/week or a total of 9 visits over a 90 day period. Treatment will include: See aboveManual Therapy, Mechanical Traction, Neuromuscular Re-education, Self-Care Home Management, Therapeutic Activities, Therapeutic Exercise, Home Exercise Program instruction, Modalities to include: Ultrasound and hot packs and taping    Next session: monitor taping; monitor wrist pain and crepitus; PNF diagonals, roll bulk putty, isometrics to wrist extensors with gripping of putty, wrist proprioceptive re-ed such as tennis ball on frisbee or hand bosu board.       Date 5/31/22 6/2/22 6/7/22        Visit # 1/9  2/9  3/9        Evaluation Initial  X            Manual              STM   x  x         MEM    x  x         IASTM    x   Tuning fork and edge of reflex hammer        Ther ex               graded isometrics to wrist extensors     yellow bulk putty progressive forward          PROM, table slides for elbow extension    5x combined with MFR by therapist 5x combined with MFR by therapist         weighted stretches into ext of elbow               PNF diagonals      1#, 10x  PNF II         bulk putty      yellow bulk, hands in mid position, slight supination motion, bilaterally         serratus ant ex   Supine, 1#          Lower traps ex   Standing at wall, 10x          Middle traps ex  Standing at wall, 10x           HEP issued See table below    tape removal        Wrist proprioceptive re-education  Tennis ball on frisbee; hand bosu board seated and standing Tennis ball on frisbee, hand bosu board, flexbar pertubations       Wall washes   2 minutes                 Functional reach: distal initiation of movement   [facility]one stacking, graded reach forward, to right and crossing LUE to left on table                                                    Rock tape applied Start formation over right lateral elbow; I strip to stabilize ECU tendon Star formation over right lateral elbow; I strip to stabilize ECU tendon Star formation over right lateral elbow; I strip to stabilize ECU tendon       modalities Deferred due to pt having just finished antibiotics 3 days ago.  5 minutes hot pack to extensors: lateral elbow and down to wrist 5 minutes, hot pack to lateral elbow and down to wrist       X= performed this date as previously outlined  HEP Date issued  Date amended Date discharged  Comments (HEP2Go code if used)   Nerve glides 5/31/22      Serratus anterior exer:  MODIFIED PLANK PLUS -  Repeat 10 Times, Hold 1 Second(s), Complete 1 Set, Perform 2 Times a Day  SCAPULAR PROTRACTION - SUPINE -  Repeat 10 Times, Hold 1 Second(s), Complete 1 Set, Perform 2 Times a Day 6/2/22   [id number]   Lower traps ex: Wall Lower Trap and Middle Trap lift off -  Repeat 10 Times, Hold 5 Seconds, Complete 2 Sets, Perform 2 Times a Day 6/2/22   [id number]   Middle traps ex: Wall Lower Trap and Middle Trap lift off -  Repeat 10 Times, Hold 5 Seconds, Complete 2 Sets, Perform 2 Times a Day 6/2/22   [id number]   SCAPULAR RETRACTIONS -  Repeat 10 Times, Hold 1 Second(s), Complete 1 Set, Perform 2 Times a Day 6/2/22   [id number]   Chicken Wings -  Repeat 10 Times, Complete 1 Set, Perform 2 Times a Day 6/2/22   [id number]            Charges: 3 TE         Total Timed Treatment: 40 minutes    Total Treatment Time: 45 minutes  CARROLL aVlera, OTR/L, CHT

## 2022-06-02 NOTE — TELEPHONE ENCOUNTER
Call placed to patient at 10 minutes past her appointment time. Patient indicated she was still about 15 or more minutes away. Explained to patient that she has missed appointment and that to come for 15 minutes would not allow for a treatment session. Offered 2:00 appt today due to missed appointment. Patient agreed, but also expressed uncertainty about coming. Requested she call if unable to attend.     Eder Rodrigues, BHARGAVS, OTR/L, CHT Care Management Interventions  PCP Verified by CM: Yes  Mode of Transport at Discharge: Self  Transition of Care Consult (CM Consult): [address]: Yes  Physical Therapy Consult: Yes  Occupational Therapy Consult: Yes  Support Systems: Friend/Neighbor  Confirm Follow Up Transport: Self  The Plan for Transition of Care is Related to the Following Treatment Goals : improve mobility  Name of the Patient Representative Who was Provided with a Choice of Provider and Agrees with the Discharge Plan: self  Freedom of Choice List was Provided with Basic Dialogue that Supports the Patient's Individualized Plan of Care/Goals, Treatment Preferences and Shares the Quality Data Associated with the Providers?: Yes  Discharge Location  Patient Expects to be Discharged to[de-identified] Home with home health  Patient is a 65y.o. year old male admitted for Right TKA . Patient plans to go stay with his friend in [facility], x 4 wks. . Order received to arrange home health. Patient without preference towards agency. Referral sent to [facility]. Patient denies any equipment needs as patient has a walker and raised toilet seat. Will follow until discharge.

## 2022-06-07 ENCOUNTER — OFFICE VISIT (OUTPATIENT)
Dept: PHYSICAL THERAPY | Age: 42
End: 2022-06-07
Attending: ORTHOPAEDIC SURGERY
Payer: MEDICAID

## 2022-06-07 DIAGNOSIS — M77.02 MEDIAL EPICONDYLITIS OF LEFT ELBOW: ICD-10-CM

## 2022-06-07 DIAGNOSIS — M77.11 LATERAL EPICONDYLITIS OF RIGHT ELBOW: ICD-10-CM

## 2022-06-07 PROCEDURE — 97110 THERAPEUTIC EXERCISES: CPT

## 2022-06-07 NOTE — PROGRESS NOTES
Insurance (Authorized # of Visits):  Medicaid 5           Authorizing Physician: Dr.O'Connor Anitra JONES visit: 6/22/22  Fall Risk: standard         Precautions:  Per orders         Date of initial eval: 5/31/22  Diagnosis:     Lateral epicondylitis of right elbow (M77.11)    Orders:    Order Date:  5/27/2022  Authorizing Provider:   Hank Morgan     Procedure:  OCCUPATIONAL THERAPY - INTERNAL [[id number]]         Order #:   [id number]  Qty:  1     Priority:  Routine                   Class:   IHP - RFL     Standing Interval:          Standing Occurrences:          Expires on:            Expected by:    Associated DX:  Lateral epicondylitis of right elbow (M77.11)     Order summary:  IHP - RFL, Routine, 8 visits  Occupational  Therapy Area of Concentration: Orthopedic  Occupational Therapy Ortho: UE  If the patient can be seen sooner with a PT vs an OT, can we cancel this order and replace with a PT order? Yes         Comments:  2-3x/week for 4-6 weeks. Modalities per PT discretion. Region Specific: Elbow  elbow ROM, strengthening, modaltities as atif    Subjective:  Reports she believes she is using hand and arm more normally, working on \"my shoulder and posture. \"  Reports the wrist \"popping and cracking bothers me more. \"  Reports she believes the \"wrist is pulling me back\" from strengthening her arm. Pain:        3-4/10      Objective: See treatment below. Hand Strength: (measured in pounds)   Date 5/31/22 Date 5/31/22 6/9/22   Position Right  Left  right    in standard position 24 52 29    in stress loaded position 14 60 20 (pain 6/10)           Assessment: Patient presents with persistent crepitus in wrist with forearm rotation. May represent ECU snapping, but is difficult to determine. Some relief with taping. Might benefit from Leukotaping simulating a Wrist Widget. This is patient's chief complaint, as the elbow pain and soft tissue appear to be improving.       Goals:  (to be met in 9 visits)  Patient will be independent and compliant with comprehensive HEP to maintain progress achieved in OT. (Progressing)  Patient to verbalize and demonstrate understanding of aggravating and alleviating factors related to lateral epicondylitis to decrease risk of recurrence for improved functional use of right UE. (Progressing)  Patient will present with  strength in the right hand to that of 75% of the contralateral hand in order to be able to perform grasp of homemaking tools such as vacuum. (Progressing)  Patient will present with increase in Patient Specific Function Scale to 6 or better to represent reported improvement in functional task performance. (Progressing)  Patient will present with sufficient ROM and strength in the right hand/arm to return to self care (doing hair) and homemaking (food prep) skill independent performance. (Progressing)  Patient will present with increase in force tolerance of gripping with the right hand in the \"stress position\" to 25 # of force with no more than 1/10 pain to allow for ease with gripping housework tools. (Progressing)    Plan:   Frequency / Duration: Patient will be seen for 2 x/week or a total of 9 visits over a 90 day period. Treatment will include: Manual Therapy, Mechanical Traction, Neuromuscular Re-education, Self-Care Home Management, Therapeutic Activities, Therapeutic Exercise, Home Exercise Program instruction, Modalities to include: Ultrasound and hot packs and taping    Next session: monitor taping; monitor wrist pain and crepitus;  Consider wrist PRE's and/or isometrics/place and hold.   Consider Wrist widget or leukotape wrist.    Date 5/31/22 6/2/22 6/7/22 6/9/22      Visit # 1/9  2/9  3/9  4/9      Evaluation Initial  X            Manual              STM   x  x  x       MEM    x  x  x       IASTM    x   Tuning fork and edge of reflex hammer  tuning fork application      Ther ex               graded isometrics to wrist extensors     yellow bulk putty progressive forward   yellow bulk putty progressive forward        PROM, table slides for elbow extension    5x combined with MFR by therapist 5x combined with MFR by therapist 6x combined with MFR by therapist      Intrinsic strengthening       Lumbrical gripper       PNF diagonals      1#, 10x  PNF II  1#, 10x, II       bulk putty      yellow bulk, hands in mid position, slight supination motion, bilaterally yellow bulk, hands in mid position, slight supination motion, bilaterally       serratus ant ex   Supine, 1#          Lower traps ex   Standing at wall, 10x          Middle traps ex  Standing at wall, 10x           HEP issued See table below    tape removal  tape removal in 1-2 days      Wrist proprioceptive re-education  Tennis ball on frisbee; hand bosu board seated and standing Tennis ball on frisbee, hand bosu board, flexbar pertubations Tennis ball on frisbee, hand bosu board, flexbar pertubations      Wall washes   2 minutes                 Functional reach: distal initiation of movement   Cone stacking, graded reach forward, to right and crossing LUE to left on table Cone stacking, reaching up to top shelf; normal movement pattern facilitation                                                   Rock tape applied Start formation over right lateral elbow; I strip to stabilize ECU tendon Star formation over right lateral elbow; I strip to stabilize ECU tendon Star formation over right lateral elbow; I strip to stabilize ECU tendon Star formation over right lateral elbow; double I strips to stabilize ECU tendon with FA in pronation      modalities Deferred due to pt having just finished antibiotics 3 days ago.  5 minutes hot pack to extensors: lateral elbow and down to wrist 5 minutes, hot pack to lateral elbow and down to wrist 5 minutes, hot pack to lateral elbow and down to wrist      X= performed this date as previously outlined  HEP Date issued  Date amended Date discharged Comments (HEP2Go code if used)   Nerve glides 5/31/22      Serratus anterior exer:  MODIFIED PLANK PLUS -  Repeat 10 Times, Hold 1 Second(s), Complete 1 Set, Perform 2 Times a Day  SCAPULAR PROTRACTION - SUPINE -  Repeat 10 Times, Hold 1 Second(s), Complete 1 Set, Perform 2 Times a Day 6/2/22   [id number]   Lower traps ex: Wall Lower Trap and Middle Trap lift off -  Repeat 10 Times, Hold 5 Seconds, Complete 2 Sets, Perform 2 Times a Day 6/2/22   [id number]   Middle traps ex: Wall Lower Trap and Middle Trap lift off -  Repeat 10 Times, Hold 5 Seconds, Complete 2 Sets, Perform 2 Times a Day 6/2/22   [id number]   SCAPULAR RETRACTIONS -  Repeat 10 Times, Hold 1 Second(s), Complete 1 Set, Perform 2 Times a Day 6/2/22   [id number]   Chicken Wings -  Repeat 10 Times, Complete 1 Set, Perform 2 Times a Day 6/2/22   [id number]            Charges: 3 TE         Total Timed Treatment: 40 minutes    Total Treatment Time: 45 minutes  Shannan Morgan, BHARGAVS, OTR/L, CHT

## 2022-06-09 ENCOUNTER — OFFICE VISIT (OUTPATIENT)
Dept: PHYSICAL THERAPY | Age: 42
End: 2022-06-09
Attending: ORTHOPAEDIC SURGERY
Payer: MEDICAID

## 2022-06-09 DIAGNOSIS — M77.11 LATERAL EPICONDYLITIS OF RIGHT ELBOW: ICD-10-CM

## 2022-06-09 DIAGNOSIS — M77.02 MEDIAL EPICONDYLITIS OF LEFT ELBOW: ICD-10-CM

## 2022-06-09 PROCEDURE — 97110 THERAPEUTIC EXERCISES: CPT

## 2022-06-09 NOTE — PROGRESS NOTES
Insurance (Authorized # of Visits):  Medicaid 5           Authorizing Physician: Dr.O'Connor Anitra JONES visit: 6/22/22  Fall Risk: standard         Precautions:  Per orders         Date of initial eval: 5/31/22  Diagnosis:     Lateral epicondylitis of right elbow (M77.11)    Orders:    Order Date:  5/27/2022  Authorizing Provider:   Jah Kern     Procedure:  OCCUPATIONAL THERAPY - INTERNAL [[id number]]         Order #:   [id number]  Qty:  1     Priority:  Routine                   Class:   IHP - RFL     Standing Interval:          Standing Occurrences:          Expires on:            Expected by:    Associated DX:  Lateral epicondylitis of right elbow (M77.11)     Order summary:  IHP - RFL, Routine, 8 visits  Occupational  Therapy Area of Concentration: Orthopedic  Occupational Therapy Ortho: UE  If the patient can be seen sooner with a PT vs an OT, can we cancel this order and replace with a PT order? Yes         Comments:  2-3x/week for 4-6 weeks. Modalities per PT discretion. Region Specific: Elbow  elbow ROM, strengthening, modaltities as atif    Subjective:  \"on and off\", \"not a constant pain\". Reports doing laundry and that may have aggravated it today. Pain:        4-5/10      Objective: See treatment below. Hand Strength: (measured in pounds)   Date 5/31/22 Date 5/31/22 6/9/22   Position Right  Left  right    in standard position 24 52 29    in stress loaded position 14 60 20 (pain 6/10)           Assessment: Patient presents with persistent snapping of likely ECU tendon at wrist.  Responded well to Leukotape in clinic, but needs to limit supination. Goals:  (to be met in 9 visits)  Patient will be independent and compliant with comprehensive HEP to maintain progress achieved in OT.   (Progressing)  Patient to verbalize and demonstrate understanding of aggravating and alleviating factors related to lateral epicondylitis to decrease risk of recurrence for improved functional use of right UE. (Progressing)  Patient will present with  strength in the right hand to that of 75% of the contralateral hand in order to be able to perform grasp of homemaking tools such as vacuum. (Progressing)  Patient will present with increase in Patient Specific Function Scale to 6 or better to represent reported improvement in functional task performance. (Progressing)  Patient will present with sufficient ROM and strength in the right hand/arm to return to self care (doing hair) and homemaking (food prep) skill independent performance. (Progressing)  Patient will present with increase in force tolerance of gripping with the right hand in the \"stress position\" to 25 # of force with no more than 1/10 pain to allow for ease with gripping housework tools. (Progressing)    Plan:   Frequency / Duration: Patient will be seen for 2 x/week or a total of 9 visits over a 90 day period. Treatment will include: Manual Therapy, Mechanical Traction, Neuromuscular Re-education, Self-Care Home Management, Therapeutic Activities, Therapeutic Exercise, Home Exercise Program instruction, Modalities to include: Ultrasound and hot packs and taping    Next session: monitor taping; monitor wrist pain and crepitus; Consider wrist PRE's and/or isometrics/place and hold.   Monitor use of leukotape at wrist.    Date 5/31/22 6/2/22 6/7/22 6/9/22 6/14/22     Visit # 1/9  2/9  3/9  4/9 5/9     Evaluation Initial  X            Manual              STM   x  x  x x      MEM    x  x  x x      IASTM    x   Tuning fork and edge of reflex hammer  tuning fork application Tuning fork and reflex hammer handle application to lateral elbow     Ther ex               graded isometrics to wrist extensors     yellow bulk putty progressive forward   yellow bulk putty progressive forward        PROM, table slides for elbow extension    5x combined with MFR by therapist 5x combined with MFR by therapist 6x combined with MFR by therapist Intrinsic strengthening       Lumbrical gripper Lumbrical gripper      PNF diagonals      1#, 10x  PNF II  1#, 10x, II       bulk putty      yellow bulk, hands in mid position, slight supination motion, bilaterally yellow bulk, hands in mid position, slight supination motion, bilaterally       serratus ant ex   Supine, 1#     reviewed     Lower traps ex   Standing at wall, 10x     reviewed     Middle traps ex  Standing at wall, 10x     reviewed      HEP issued See table below    tape removal  tape removal in 1-2 days Tape removal in 1-2 days     Wrist proprioceptive re-education  Tennis ball on frisbee; hand bosu board seated and standing Tennis ball on frisbee, hand bosu board, flexbar pertubations Tennis ball on frisbee, hand bosu board, flexbar pertubations Tennis ball on frisbee, bilateral,      Wall washes   2 minutes       Hand gripper with pegs     FA in neutral position, taped. Functional reach: distal initiation of movement   Cone stacking, graded reach forward, to right and crossing LUE to left on table Cone stacking, reaching up to top shelf; normal movement pattern facilitation Reviewed with reach and grasp of weight     Wrist PRE's     3 sets of 10 with 1#                                        Rock tape applied Start formation over right lateral elbow; I strip to stabilize ECU tendon Star formation over right lateral elbow; I strip to stabilize ECU tendon Star formation over right lateral elbow; I strip to stabilize ECU tendon Star formation over right lateral elbow; double I strips to stabilize ECU tendon with FA in pronation Leukotape applied to wrist, stabilizing ECU tendon with forearm in pronation. modalities Deferred due to pt having just finished antibiotics 3 days ago.  5 minutes hot pack to extensors: lateral elbow and down to wrist 5 minutes, hot pack to lateral elbow and down to wrist 5 minutes, hot pack to lateral elbow and down to wrist 5 minutes, hot pack to lateral elbow and down to wrist     X= performed this date as previously outlined  HEP Date issued  Date amended Date discharged  Comments (HEP2Go code if used)   Nerve glides 5/31/22      Serratus anterior exer:  MODIFIED PLANK PLUS -  Repeat 10 Times, Hold 1 Second(s), Complete 1 Set, Perform 2 Times a Day  SCAPULAR PROTRACTION - SUPINE -  Repeat 10 Times, Hold 1 Second(s), Complete 1 Set, Perform 2 Times a Day 6/2/22   [id number]   Lower traps ex: Wall Lower Trap and Middle Trap lift off -  Repeat 10 Times, Hold 5 Seconds, Complete 2 Sets, Perform 2 Times a Day 6/2/22   [id number]   Middle traps ex: Wall Lower Trap and Middle Trap lift off -  Repeat 10 Times, Hold 5 Seconds, Complete 2 Sets, Perform 2 Times a Day 6/2/22   [id number]   SCAPULAR RETRACTIONS -  Repeat 10 Times, Hold 1 Second(s), Complete 1 Set, Perform 2 Times a Day 6/2/22   [id number]   Chicken Wings -  Repeat 10 Times, Complete 1 Set, Perform 2 Times a Day 6/2/22   [id number]            Charges: 3 TE         Total Timed Treatment: 40 minutes    Total Treatment Time: 45 minutes  Monetta Kocher, MHS, OTR/L, CHT

## 2022-06-14 ENCOUNTER — OFFICE VISIT (OUTPATIENT)
Dept: PHYSICAL THERAPY | Age: 42
End: 2022-06-14
Attending: ORTHOPAEDIC SURGERY
Payer: MEDICAID

## 2022-06-14 DIAGNOSIS — M77.11 LATERAL EPICONDYLITIS OF RIGHT ELBOW: ICD-10-CM

## 2022-06-14 DIAGNOSIS — M77.02 MEDIAL EPICONDYLITIS OF LEFT ELBOW: ICD-10-CM

## 2022-06-14 PROCEDURE — 97110 THERAPEUTIC EXERCISES: CPT

## 2022-06-14 NOTE — PROGRESS NOTES
Insurance (Authorized # of Visits):  Medicaid 5           Authorizing Physician: Dr.O'Connor Ayoub MD visit: 6/22/22  Fall Risk: standard         Precautions:  Per orders         Date of initial eval: 5/31/22  Diagnosis:     Lateral epicondylitis of right elbow (M77.11)    Orders:    Order Date:  5/27/2022  Authorizing Provider:   Vic Stallings     Procedure:  OCCUPATIONAL THERAPY - INTERNAL [[id number]]         Order #:   [id number]  Qty:  1     Priority:  Routine                   Class:   IHP - RFL     Standing Interval:          Standing Occurrences:          Expires on:            Expected by:    Associated DX:  Lateral epicondylitis of right elbow (M77.11)     Order summary:  IHP - RFL, Routine, 8 visits  Occupational  Therapy Area of Concentration: Orthopedic  Occupational Therapy Ortho: UE  If the patient can be seen sooner with a PT vs an OT, can we cancel this order and replace with a PT order? Yes         Comments:  2-3x/week for 4-6 weeks. Modalities per PT discretion. Region Specific: Elbow  elbow ROM, strengthening, modaltities as atif    Subjective: \"Today's a bad day. \"      Pain:        5/10      Objective: See treatment below. Hand Strength: (measured in pounds)   Date 5/31/22 Date 5/31/22 6/9/22   Position Right  Left  right    in standard position 24 52 29    in stress loaded position 14 60 20 (pain 6/10)           Assessment: Patient presents with good response to taping at the elbow as well as leukotape to the wrist.  Without limiting the forearm rotation/motion, she is at risk for snapping at the wrist, which may represent the ECU tendon subluxing. Will need MD assessment and direction on this. Goals:  (to be met in 9 visits)  Patient will be independent and compliant with comprehensive HEP to maintain progress achieved in OT.   (Progressing)  Patient to verbalize and demonstrate understanding of aggravating and alleviating factors related to lateral epicondylitis to decrease risk of recurrence for improved functional use of right UE. (Progressing)  Patient will present with  strength in the right hand to that of 75% of the contralateral hand in order to be able to perform grasp of homemaking tools such as vacuum. (Progressing)  Patient will present with increase in Patient Specific Function Scale to 6 or better to represent reported improvement in functional task performance. (Progressing)  Patient will present with sufficient ROM and strength in the right hand/arm to return to self care (doing hair) and homemaking (food prep) skill independent performance. (Progressing)  Patient will present with increase in force tolerance of gripping with the right hand in the \"stress position\" to 25 # of force with no more than 1/10 pain to allow for ease with gripping housework tools. (Progressing)    Plan:   Frequency / Duration: Patient will be seen for 2 x/week or a total of 9 visits over a 90 day period. Treatment will include: Manual Therapy, Mechanical Traction, Neuromuscular Re-education, Self-Care Home Management, Therapeutic Activities, Therapeutic Exercise, Home Exercise Program instruction, Modalities to include: Ultrasound and hot packs and taping    Next session:  Continue wrist PRE's and/or isometrics/place and hold. Monitor use of leukotape at wrist.  Take  strength measurement.     Date 5/31/22 6/2/22 6/7/22 6/9/22 6/14/22 6/16/22    Visit # 1/9  2/9  3/9  4/9 5/9 6/9    Evaluation Initial  X            Manual              STM   x  x  x x x     MEM    x  x  x x      IASTM    x   Tuning fork and edge of reflex hammer  tuning fork application Tuning fork and reflex hammer handle application to lateral elbow Tuning fork and reflex hammer handle application to lateral elbow    Ther ex               graded isometrics to wrist extensors     yellow bulk putty progressive forward   yellow bulk putty progressive forward   Yellow bulk putty     PROM, table slides for elbow extension    5x combined with MFR by therapist 5x combined with MFR by therapist 6x combined with MFR by therapist      Intrinsic strengthening       Lumbrical gripper Lumbrical gripper Lumbrical gripper     PNF diagonals      1#, 10x  PNF II  1#, 10x, II       bulk putty      yellow bulk, hands in mid position, slight supination motion, bilaterally yellow bulk, hands in mid position, slight supination motion, bilaterally  Yellow bulk, hands in mid position     serratus ant ex   Supine, 1#     reviewed     Lower traps ex   Standing at wall, 10x     reviewed     Middle traps ex  Standing at wall, 10x     reviewed      HEP issued See table below    tape removal  tape removal in 1-2 days Tape removal in 1-2 days Tape removal in 1-3 days as indicated. Wrist proprioceptive re-education  Tennis ball on frisbee; hand bosu board seated and standing Tennis ball on frisbee, hand bosu board, flexbar pertubations Tennis ball on frisbee, hand bosu board, flexbar pertubations Tennis ball on frisbee, bilateral,  Tennis ball on frisbee, hand bosu board, flexbar pertubations    Wall washes   2 minutes       Hand gripper with pegs     FA in neutral position, taped. FA in neutral position, taped.     Functional reach: distal initiation of movement   Cone stacking, graded reach forward, to right and crossing LUE to left on table Cone stacking, reaching up to top shelf; normal movement pattern facilitation Reviewed with reach and grasp of weight     Wrist PRE's     3 sets of 10 with 1# 3 sets of 10 with 1# extension only                                       Rock tape applied Start formation over right lateral elbow; I strip to stabilize ECU tendon Star formation over right lateral elbow; I strip to stabilize ECU tendon Star formation over right lateral elbow; I strip to stabilize ECU tendon Star formation over right lateral elbow; double I strips to stabilize ECU tendon with FA in pronation Leukotape applied to wrist, stabilizing ECU tendon with forearm in pronation. Leukotape applied to wrist, stabilizing ECU tendon with forearm in pronation. KT tape of patient's personal applied in star formation at lateral elbow, right.    modalities Deferred due to pt having just finished antibiotics 3 days ago.  5 minutes hot pack to extensors: lateral elbow and down to wrist 5 minutes, hot pack to lateral elbow and down to wrist 5 minutes, hot pack to lateral elbow and down to wrist 5 minutes, hot pack to lateral elbow and down to wrist 5 minutes, hot pack to lateral elbow and down to wrist    X= performed this date as previously outlined  HEP Date issued  Date amended Date discharged  Comments (HEP2Go code if used)   Nerve glides 5/31/22      Serratus anterior exer:  MODIFIED PLANK PLUS -  Repeat 10 Times, Hold 1 Second(s), Complete 1 Set, Perform 2 Times a Day  SCAPULAR PROTRACTION - SUPINE -  Repeat 10 Times, Hold 1 Second(s), Complete 1 Set, Perform 2 Times a Day 6/2/22   [id number]   Lower traps ex: Wall Lower Trap and Middle Trap lift off -  Repeat 10 Times, Hold 5 Seconds, Complete 2 Sets, Perform 2 Times a Day 6/2/22   [id number]   Middle traps ex: Wall Lower Trap and Middle Trap lift off -  Repeat 10 Times, Hold 5 Seconds, Complete 2 Sets, Perform 2 Times a Day 6/2/22   [id number]   SCAPULAR RETRACTIONS -  Repeat 10 Times, Hold 1 Second(s), Complete 1 Set, Perform 2 Times a Day 6/2/22   [id number]   Chicken Wings -  Repeat 10 Times, Complete 1 Set, Perform 2 Times a Day 6/2/22   [id number]            Charges: 3 TE         Total Timed Treatment: 40 minutes    Total Treatment Time: 45 minutes  CARROLL Alves, OTR/L, CHT

## 2022-06-16 ENCOUNTER — OFFICE VISIT (OUTPATIENT)
Dept: PHYSICAL THERAPY | Age: 42
End: 2022-06-16
Attending: ORTHOPAEDIC SURGERY
Payer: MEDICAID

## 2022-06-16 DIAGNOSIS — M77.02 MEDIAL EPICONDYLITIS OF LEFT ELBOW: ICD-10-CM

## 2022-06-16 DIAGNOSIS — M77.11 LATERAL EPICONDYLITIS OF RIGHT ELBOW: ICD-10-CM

## 2022-06-16 PROCEDURE — 97110 THERAPEUTIC EXERCISES: CPT

## 2022-06-16 NOTE — PROGRESS NOTES
Insurance (Authorized # of Visits):  Medicaid 5           Authorizing Physician: Dr.O'Connor Anitra JONES visit: 6/22/22  Fall Risk: standard         Precautions:  Per orders         Date of initial eval: 5/31/22  Diagnosis:     Lateral epicondylitis of right elbow (M77.11)    Orders:    Order Date:  5/27/2022  Authorizing Provider:   Vic Stallings     Procedure:  OCCUPATIONAL THERAPY - INTERNAL [[id number]]         Order #:   [id number]  Qty:  1     Priority:  Routine                   Class:   IHP - RFL     Standing Interval:          Standing Occurrences:          Expires on:            Expected by:    Associated DX:  Lateral epicondylitis of right elbow (M77.11)     Order summary:  IHP - RFL, Routine, 8 visits  Occupational  Therapy Area of Concentration: Orthopedic  Occupational Therapy Ortho: UE  If the patient can be seen sooner with a PT vs an OT, can we cancel this order and replace with a PT order? Yes         Comments:  2-3x/week for 4-6 weeks. Modalities per PT discretion. Region Specific: Elbow  elbow ROM, strengthening, modaltities as atif    Subjective: Reports no problems with HEP. Reports functional use of RUE is improving. Pain:        4/10      Objective: See treatment below. Hand Strength: (measured in pounds)   Date 5/31/22 Date 5/31/22 6/9/22 6/21/22   Position Right  Left  right right    in standard position 24 52 29 31    in stress loaded position 14 60 20 (pain 6/10) 20 (pain 5/10)           Assessment: Patient presents with steadily improving functional use of RUE. However, wrist crepitus and snapping continues, but is reduced with leukotape. This crepitus is most bothersome to the patient. Goals:  (to be met in 9 visits)  Patient will be independent and compliant with comprehensive HEP to maintain progress achieved in OT.   (Progressing)  Patient to verbalize and demonstrate understanding of aggravating and alleviating factors related to lateral epicondylitis to decrease risk of recurrence for improved functional use of right UE. (Progressing)  Patient will present with  strength in the right hand to that of 75% of the contralateral hand in order to be able to perform grasp of homemaking tools such as vacuum. (Progressing)  Patient will present with increase in Patient Specific Function Scale to 6 or better to represent reported improvement in functional task performance. (Progressing)  Patient will present with sufficient ROM and strength in the right hand/arm to return to self care (doing hair) and homemaking (food prep) skill independent performance. (Progressing)  Patient will present with increase in force tolerance of gripping with the right hand in the \"stress position\" to 25 # of force with no more than 1/10 pain to allow for ease with gripping housework tools. (Progressing)    Plan:   Frequency / Duration: Patient will be seen for 2 x/week or a total of 9 visits over a 90 day period. Treatment will include: Manual Therapy, Mechanical Traction, Neuromuscular Re-education, Self-Care Home Management, Therapeutic Activities, Therapeutic Exercise, Home Exercise Program instruction, Modalities to include: Ultrasound and hot packs and taping    Next session:  Continue wrist PRE's and/or isometrics/place and hold.      Date 5/31/22 6/2/22 6/7/22 6/9/22 6/14/22 6/16/22 6/21/22   Visit # 1/9  2/9  3/9  4/9 5/9 6/9 7/9   Evaluation Initial  X            Manual              STM   x  x  x x x     MEM    x  x  x x      IASTM    x   Tuning fork and edge of reflex hammer  tuning fork application Tuning fork and reflex hammer handle application to lateral elbow Tuning fork and reflex hammer handle application to lateral elbow    Ther ex               graded isometrics to wrist extensors     yellow bulk putty progressive forward   yellow bulk putty progressive forward   Yellow bulk putty Yellow bulk putty    PROM, table slides for elbow extension    5x combined with MFR by therapist 5x combined with MFR by therapist 6x combined with MFR by therapist      Intrinsic strengthening       Lumbrical gripper Lumbrical gripper Lumbrical gripper Lumbrical gripper    PNF diagonals      1#, 10x  PNF II  1#, 10x, II       bulk putty      yellow bulk, hands in mid position, slight supination motion, bilaterally yellow bulk, hands in mid position, slight supination motion, bilaterally  Yellow bulk, hands in mid position Yellow bulk, hands in forearm pronation    serratus ant ex   Supine, 1#     reviewed     Lower traps ex   Standing at wall, 10x     reviewed     Middle traps ex  Standing at wall, 10x     reviewed      HEP issued See table below    tape removal  tape removal in 1-2 days Tape removal in 1-2 days Tape removal in 1-3 days as indicated. Tape removal tonight or tomorrow before MD appt   Wrist proprioceptive re-education  Tennis ball on frisbee; hand bosu board seated and standing Tennis ball on frisbee, hand bosu board, flexbar pertubations Tennis ball on frisbee, hand bosu board, flexbar pertubations Tennis ball on frisbee, bilateral,  Tennis ball on frisbee, hand bosu board, flexbar pertubations Hand bosu board seated and standing; flexbar pertubations open- and close-chain. Wall washes   2 minutes       Hand gripper with pegs     FA in neutral position, taped. FA in neutral position, taped.  FA in neutral position, taped; first rung   Functional reach: distal initiation of movement   Cone stacking, graded reach forward, to right and crossing LUE to left on table Cone stacking, reaching up to top shelf; normal movement pattern facilitation Reviewed with reach and grasp of weight     Wrist PRE's     3 sets of 10 with 1# 3 sets of 10 with 1# extension only                                       Rock tape applied Start formation over right lateral elbow; I strip to stabilize ECU tendon Star formation over right lateral elbow; I strip to stabilize ECU tendon Star formation over right lateral elbow; I strip to stabilize ECU tendon Star formation over right lateral elbow; double I strips to stabilize ECU tendon with FA in pronation Leukotape applied to wrist, stabilizing ECU tendon with forearm in pronation. Leukotape applied to wrist, stabilizing ECU tendon with forearm in pronation. KT tape of patient's personal applied in star formation at lateral elbow, right. Leukotape applied to wrist, stabilizing ECU tendon with forearm in pronation. KT tape of patient's personal applied in star formation at lateral elbow, right.   modalities Deferred due to pt having just finished antibiotics 3 days ago. 5 minutes hot pack to extensors: lateral elbow and down to wrist 5 minutes, hot pack to lateral elbow and down to wrist 5 minutes, hot pack to lateral elbow and down to wrist 5 minutes, hot pack to lateral elbow and down to wrist 5 minutes, hot pack to lateral elbow and down to wrist Ultrasound:  3mHz  0.8w/cm2  50%  to right lateral elbow  for 8 minutes.      X= performed this date as previously outlined  HEP Date issued  Date amended Date discharged  Comments (HEP2Go code if used)   Nerve reina 5/31/22      Serratus anterior exer:  MODIFIED PLANK PLUS -  Repeat 10 Times, Hold 1 Second(s), Complete 1 Set, Perform 2 Times a Day  SCAPULAR PROTRACTION - SUPINE -  Repeat 10 Times, Hold 1 Second(s), Complete 1 Set, Perform 2 Times a Day 6/2/22   [id number]   Lower traps ex: Wall Lower Trap and Middle Trap lift off -  Repeat 10 Times, Hold 5 Seconds, Complete 2 Sets, Perform 2 Times a Day 6/2/22   [id number]   Middle traps ex: Wall Lower Trap and Middle Trap lift off -  Repeat 10 Times, Hold 5 Seconds, Complete 2 Sets, Perform 2 Times a Day 6/2/22   [id number]   SCAPULAR RETRACTIONS -  Repeat 10 Times, Hold 1 Second(s), Complete 1 Set, Perform 2 Times a Day 6/2/22   [id number]   Chicken Wings -  Repeat 10 Times, Complete 1 Set, Perform 2 Times a Day 6/2/22   [id number]            Charges: 3 TE         Total Timed Treatment: 45 minutes    Total Treatment Time: 45 minutes  CARROLL Gar, OTR/L, CHT

## 2022-06-17 ENCOUNTER — TELEMEDICINE (OUTPATIENT)
Dept: PHYSICAL MEDICINE AND REHAB | Facility: CLINIC | Age: 42
End: 2022-06-17

## 2022-06-17 DIAGNOSIS — M79.671 RIGHT FOOT PAIN: ICD-10-CM

## 2022-06-17 DIAGNOSIS — R20.0 NUMBNESS IN BOTH HANDS: ICD-10-CM

## 2022-06-17 DIAGNOSIS — M51.37 DDD (DEGENERATIVE DISC DISEASE), LUMBOSACRAL: ICD-10-CM

## 2022-06-17 DIAGNOSIS — M54.12 CERVICAL RADICULOPATHY: ICD-10-CM

## 2022-06-17 DIAGNOSIS — M47.816 LUMBAR SPONDYLOSIS: ICD-10-CM

## 2022-06-17 DIAGNOSIS — M79.674 CHRONIC TOE PAIN, RIGHT FOOT: ICD-10-CM

## 2022-06-17 DIAGNOSIS — M19.071 OSTEOARTHRITIS OF TOE JOINT, RIGHT: Primary | ICD-10-CM

## 2022-06-17 DIAGNOSIS — M50.30 DDD (DEGENERATIVE DISC DISEASE), CERVICAL: ICD-10-CM

## 2022-06-17 DIAGNOSIS — M79.10 MYALGIA: ICD-10-CM

## 2022-06-17 DIAGNOSIS — R53.1 WEAKNESS: ICD-10-CM

## 2022-06-17 DIAGNOSIS — R20.0 NUMBNESS IN FEET: ICD-10-CM

## 2022-06-17 DIAGNOSIS — M25.78 DEGENERATION OF INTERVERTEBRAL DISC OF CERVICAL REGION WITH OSTEOPHYTE OF CERVICAL VERTEBRA: ICD-10-CM

## 2022-06-17 DIAGNOSIS — M50.30 DEGENERATION OF INTERVERTEBRAL DISC OF CERVICAL REGION WITH OSTEOPHYTE OF CERVICAL VERTEBRA: ICD-10-CM

## 2022-06-17 DIAGNOSIS — G89.29 CHRONIC TOE PAIN, RIGHT FOOT: ICD-10-CM

## 2022-06-17 NOTE — PATIENT INSTRUCTIONS
1) My office will call you once the MRI Brain, cervical spine, lumbar spine is approved by your insurance. You should then schedule the MRI and call my office again to make an appointment with me 2-3 days after your exam for review of your images and a further plan. If your MRI is not being performed at [facility] or its affiliates, please make sure to bring a copy of the images. 2) Please get X-rays of the Lumbar spine today on your way out.    3) Come into the office on Monday at 12:30 in the [facility] so I can examine you further

## 2022-06-18 ENCOUNTER — HOSPITAL ENCOUNTER (OUTPATIENT)
Dept: GENERAL RADIOLOGY | Facility: HOSPITAL | Age: 42
Discharge: HOME | End: 2022-06-18
Attending: PHYSICAL MEDICINE & REHABILITATION
Payer: MEDICAID

## 2022-06-18 DIAGNOSIS — M25.78 DEGENERATION OF INTERVERTEBRAL DISC OF CERVICAL REGION WITH OSTEOPHYTE OF CERVICAL VERTEBRA: ICD-10-CM

## 2022-06-18 DIAGNOSIS — M79.674 CHRONIC TOE PAIN, RIGHT FOOT: ICD-10-CM

## 2022-06-18 DIAGNOSIS — R20.0 NUMBNESS IN FEET: ICD-10-CM

## 2022-06-18 DIAGNOSIS — R53.1 WEAKNESS: ICD-10-CM

## 2022-06-18 DIAGNOSIS — M50.30 DEGENERATION OF INTERVERTEBRAL DISC OF CERVICAL REGION WITH OSTEOPHYTE OF CERVICAL VERTEBRA: ICD-10-CM

## 2022-06-18 DIAGNOSIS — M19.071 OSTEOARTHRITIS OF TOE JOINT, RIGHT: ICD-10-CM

## 2022-06-18 DIAGNOSIS — M79.671 RIGHT FOOT PAIN: ICD-10-CM

## 2022-06-18 DIAGNOSIS — M51.37 DDD (DEGENERATIVE DISC DISEASE), LUMBOSACRAL: ICD-10-CM

## 2022-06-18 DIAGNOSIS — M54.12 CERVICAL RADICULOPATHY: ICD-10-CM

## 2022-06-18 DIAGNOSIS — G89.29 CHRONIC TOE PAIN, RIGHT FOOT: ICD-10-CM

## 2022-06-18 DIAGNOSIS — R20.0 NUMBNESS IN BOTH HANDS: ICD-10-CM

## 2022-06-18 DIAGNOSIS — M47.816 LUMBAR SPONDYLOSIS: ICD-10-CM

## 2022-06-18 DIAGNOSIS — M50.30 DDD (DEGENERATIVE DISC DISEASE), CERVICAL: ICD-10-CM

## 2022-06-18 DIAGNOSIS — M79.10 MYALGIA: ICD-10-CM

## 2022-06-18 PROCEDURE — 72110 X-RAY EXAM L-2 SPINE 4/>VWS: CPT | Performed by: PHYSICAL MEDICINE & REHABILITATION

## 2022-06-20 ENCOUNTER — OFFICE VISIT (OUTPATIENT)
Dept: PHYSICAL MEDICINE AND REHAB | Facility: CLINIC | Age: 42
End: 2022-06-20
Payer: MEDICAID

## 2022-06-20 VITALS — OXYGEN SATURATION: 100 % | HEIGHT: 64 IN | WEIGHT: 168 LBS | BODY MASS INDEX: 28.68 KG/M2 | HEART RATE: 79 BPM

## 2022-06-20 DIAGNOSIS — M50.30 DDD (DEGENERATIVE DISC DISEASE), CERVICAL: ICD-10-CM

## 2022-06-20 DIAGNOSIS — M51.37 DDD (DEGENERATIVE DISC DISEASE), LUMBOSACRAL: ICD-10-CM

## 2022-06-20 DIAGNOSIS — M54.59 LUMBAR TRIGGER POINT SYNDROME: ICD-10-CM

## 2022-06-20 DIAGNOSIS — M79.10 MYALGIA: Primary | ICD-10-CM

## 2022-06-20 DIAGNOSIS — R20.0 NUMBNESS IN BOTH HANDS: ICD-10-CM

## 2022-06-20 DIAGNOSIS — M48.061 LUMBAR FORAMINAL STENOSIS: ICD-10-CM

## 2022-06-20 DIAGNOSIS — M47.816 FACET SYNDROME, LUMBAR: ICD-10-CM

## 2022-06-20 DIAGNOSIS — R20.0 NUMBNESS IN FEET: ICD-10-CM

## 2022-06-20 DIAGNOSIS — R53.1 WEAKNESS: ICD-10-CM

## 2022-06-20 DIAGNOSIS — M54.59 MECHANICAL LOW BACK PAIN: ICD-10-CM

## 2022-06-20 DIAGNOSIS — M47.816 LUMBAR SPONDYLOSIS: ICD-10-CM

## 2022-06-20 DIAGNOSIS — M54.12 CERVICAL RADICULOPATHY: ICD-10-CM

## 2022-06-20 DIAGNOSIS — M79.671 RIGHT FOOT PAIN: ICD-10-CM

## 2022-06-20 NOTE — PATIENT INSTRUCTIONS
1 Lets stick to the plan of having MRI brain, cervical spine, and lumbar spine  2) Start physical therapy focusing on the low back and lower extremities  3) Follow up with me to review the MRI images   4) Can consider trigger point injections to the neck and upper back if MRI is unremarkable.

## 2022-06-21 ENCOUNTER — OFFICE VISIT (OUTPATIENT)
Dept: PHYSICAL THERAPY | Age: 42
End: 2022-06-21
Attending: ORTHOPAEDIC SURGERY
Payer: MEDICAID

## 2022-06-21 DIAGNOSIS — M77.02 MEDIAL EPICONDYLITIS OF LEFT ELBOW: ICD-10-CM

## 2022-06-21 DIAGNOSIS — M77.11 LATERAL EPICONDYLITIS OF RIGHT ELBOW: ICD-10-CM

## 2022-06-21 PROCEDURE — 97110 THERAPEUTIC EXERCISES: CPT

## 2022-06-21 NOTE — PROGRESS NOTES
Insurance (Authorized # of Visits):  Medicaid 5           Authorizing Physician: Dr.O'Connor Anitra JONES visit: 6/22/22  Fall Risk: standard         Precautions:  Per orders         Date of initial eval: 5/31/22  Diagnosis:     Lateral epicondylitis of right elbow (M77.11)    Orders:    Order Date:  5/27/2022  Authorizing Provider:   Vesna Pisano     Procedure:  OCCUPATIONAL THERAPY - INTERNAL [[id number]]         Order #:   [id number]  Qty:  1     Priority:  Routine                   Class:   IHP - RFL     Standing Interval:          Standing Occurrences:          Expires on:            Expected by:    Associated DX:  Lateral epicondylitis of right elbow (M77.11)     Order summary:  IHP - RFL, Routine, 8 visits  Occupational  Therapy Area of Concentration: Orthopedic  Occupational Therapy Ortho: UE  If the patient can be seen sooner with a PT vs an OT, can we cancel this order and replace with a PT order? Yes         Comments:  2-3x/week for 4-6 weeks. Modalities per PT discretion. Region Specific: Elbow  elbow ROM, strengthening, modaltities as atif    Subjective: \"He will look at my wrist at another appointment, later in July. \"    Pain:        4/10  elbow      Objective: See treatment below. Hand Strength: (measured in pounds)   Date 5/31/22 Date 5/31/22 6/9/22 6/21/22   Position Right  Left  right right    in standard position 24 52 29 31    in stress loaded position 14 60 20 (pain 6/10) 20 (pain 5/10)           Assessment: Patient presents with more normal movement patterns in RUE. Anticipate she will be ready for planned discharge next session. Goals:  (to be met in 9 visits)  Patient will be independent and compliant with comprehensive HEP to maintain progress achieved in OT. (Progressing)  Patient to verbalize and demonstrate understanding of aggravating and alleviating factors related to lateral epicondylitis to decrease risk of recurrence for improved functional use of right UE. (Progressing)  Patient will present with  strength in the right hand to that of 75% of the contralateral hand in order to be able to perform grasp of homemaking tools such as vacuum. (Progressing)  Patient will present with increase in Patient Specific Function Scale to 6 or better to represent reported improvement in functional task performance. (Progressing)  Patient will present with sufficient ROM and strength in the right hand/arm to return to self care (doing hair) and homemaking (food prep) skill independent performance. (Progressing)  Patient will present with increase in force tolerance of gripping with the right hand in the \"stress position\" to 25 # of force with no more than 1/10 pain to allow for ease with gripping housework tools. (Progressing)    Plan:   Frequency / Duration: Patient will be seen for 2 x/week or a total of 9 visits over a 90 day period.   Treatment will include: Manual Therapy, Mechanical Traction, Neuromuscular Re-education, Self-Care Home Management, Therapeutic Activities, Therapeutic Exercise, Home Exercise Program instruction, Modalities to include: Ultrasound and hot packs and taping    Next session: transition to HEP and self management    Date 5/31/22 6/2/22 6/7/22 6/9/22 6/14/22 6/16/22 6/21/22 6/23/22   Visit # 1/9  2/9  3/9  4/9 5/9 6/9 7/9 8/9   Evaluation Initial  X             Manual               STM   x  x  x x x      MEM    x  x  x x       IASTM    x   Tuning fork and edge of reflex hammer  tuning fork application Tuning fork and reflex hammer handle application to lateral elbow Tuning fork and reflex hammer handle application to lateral elbow     Ther ex                graded isometrics to wrist extensors     yellow bulk putty progressive forward   yellow bulk putty progressive forward   Yellow bulk putty Yellow bulk putty Yellow bulk puty    PROM, table slides for elbow extension    5x combined with MFR by therapist 5x combined with MFR by therapist 6x combined with MFR by therapist       Intrinsic strengthening       Lumbrical gripper Lumbrical gripper Lumbrical gripper Lumbrical gripper Lumbrical gripper    PNF diagonals      1#, 10x  PNF II  1#, 10x, II        bulk putty      yellow bulk, hands in mid position, slight supination motion, bilaterally yellow bulk, hands in mid position, slight supination motion, bilaterally  Yellow bulk, hands in mid position Yellow bulk, hands in forearm pronation Yellow bulk putty    serratus ant ex   Supine, 1#     reviewed      Lower traps ex   Standing at wall, 10x     reviewed      Middle traps ex  Standing at wall, 10x     reviewed       HEP issued See table below    tape removal  tape removal in 1-2 days Tape removal in 1-2 days Tape removal in 1-3 days as indicated. Tape removal tonight or tomorrow before MD appt    Wrist proprioceptive re-education  Tennis ball on frisbee; hand bosu board seated and standing Tennis ball on frisbee, hand bosu board, flexbar pertubations Tennis ball on frisbee, hand bosu board, flexbar pertubations Tennis ball on frisbee, bilateral,  Tennis ball on frisbee, hand bosu board, flexbar pertubations Hand bosu board seated and standing; flexbar pertubations open- and close-chain. Hand bosu board seated and standing; flexbar pertubations open- and close-chain. Wall washes   2 minutes        Hand gripper with pegs     FA in neutral position, taped. FA in neutral position, taped.  FA in neutral position, taped; first rung FA neutral, taped, second rung   Functional reach: distal initiation of movement   Cone stacking, graded reach forward, to right and crossing LUE to left on table Cone stacking, reaching up to top shelf; normal movement pattern facilitation Reviewed with reach and grasp of weight      Wrist PRE's     3 sets of 10 with 1# 3 sets of 10 with 1# extension only  Place and hold, 45 seconds each, 1#, 2 reps                                         Rock tape applied Start formation over right lateral elbow; I strip to stabilize ECU tendon Star formation over right lateral elbow; I strip to stabilize ECU tendon Star formation over right lateral elbow; I strip to stabilize ECU tendon Star formation over right lateral elbow; double I strips to stabilize ECU tendon with FA in pronation Leukotape applied to wrist, stabilizing ECU tendon with forearm in pronation. Leukotape applied to wrist, stabilizing ECU tendon with forearm in pronation. KT tape of patient's personal applied in star formation at lateral elbow, right. Leukotape applied to wrist, stabilizing ECU tendon with forearm in pronation. KT tape of patient's personal applied in star formation at lateral elbow, right. Leukotape applied to wrist, stabilizing ECU tendon with forearm in pronation. modalities Deferred due to pt having just finished antibiotics 3 days ago. 5 minutes hot pack to extensors: lateral elbow and down to wrist 5 minutes, hot pack to lateral elbow and down to wrist 5 minutes, hot pack to lateral elbow and down to wrist 5 minutes, hot pack to lateral elbow and down to wrist 5 minutes, hot pack to lateral elbow and down to wrist Ultrasound:  3mHz  0.8w/cm2  50%  to right lateral elbow  for 8 minutes. Ultrasound:  3mHz  0.8w/cm2  50%  to right lateral elbow  for 8 minutes.    X= performed this date as previously outlined  HEP Date issued  Date amended Date discharged  Comments (HEP2Go code if used)   Nerve glides 5/31/22      Serratus anterior exer:  MODIFIED PLANK PLUS -  Repeat 10 Times, Hold 1 Second(s), Complete 1 Set, Perform 2 Times a Day  SCAPULAR PROTRACTION - SUPINE -  Repeat 10 Times, Hold 1 Second(s), Complete 1 Set, Perform 2 Times a Day 6/2/22   [id number]   Lower traps ex: Wall Lower Trap and Middle Trap lift off -  Repeat 10 Times, Hold 5 Seconds, Complete 2 Sets, Perform 2 Times a Day 6/2/22   [id number]   Middle traps ex: Wall Lower Trap and Middle Trap lift off -  Repeat 10 Times, Hold 5 Seconds, Complete 2 Sets, Perform 2 Times a Day 6/2/22   [id number]   SCAPULAR RETRACTIONS -  Repeat 10 Times, Hold 1 Second(s), Complete 1 Set, Perform 2 Times a Day 6/2/22   [id number]   Chicken Wings -  Repeat 10 Times, Complete 1 Set, Perform 2 Times a Day 6/2/22   [id number]     Charges: 3 TE         Total Timed Treatment: 38 minutes    Total Treatment Time: 38 minutes  Nicole Vila, CARROLL, OTR/L, CHT

## 2022-06-22 ENCOUNTER — OFFICE VISIT (OUTPATIENT)
Dept: ORTHOPEDICS CLINIC | Facility: CLINIC | Age: 42
End: 2022-06-22
Payer: MEDICAID

## 2022-06-22 DIAGNOSIS — M77.11 LATERAL EPICONDYLITIS OF RIGHT ELBOW: Primary | ICD-10-CM

## 2022-06-22 PROCEDURE — 99213 OFFICE O/P EST LOW 20 MIN: CPT | Performed by: ORTHOPAEDIC SURGERY

## 2022-06-23 ENCOUNTER — OFFICE VISIT (OUTPATIENT)
Dept: PHYSICAL THERAPY | Age: 42
End: 2022-06-23
Attending: ORTHOPAEDIC SURGERY
Payer: MEDICAID

## 2022-06-23 DIAGNOSIS — M77.11 LATERAL EPICONDYLITIS OF RIGHT ELBOW: ICD-10-CM

## 2022-06-23 DIAGNOSIS — M77.02 MEDIAL EPICONDYLITIS OF LEFT ELBOW: ICD-10-CM

## 2022-06-23 PROCEDURE — 97110 THERAPEUTIC EXERCISES: CPT

## 2022-06-23 NOTE — PROGRESS NOTES
Skyegemma  Pt has attended 9 visits in Occupational Therapy. Insurance (Authorized # of Visits):  Medicaid 5           Authorizing Physician:  Next MD visit: 6/22/22  Fall Risk: standard         Precautions:  Per orders         Date of initial eval: 5/31/22  Diagnosis:     Lateral epicondylitis of right elbow (M77.11)    Orders:    Order Date:  5/27/2022  Authorizing Provider:   Vic Stallings     Procedure:  OCCUPATIONAL THERAPY - INTERNAL [[id number]]         Order #:   [id number]  Qty:  1     Priority:  Routine                   Class:   IHP - RFL     Standing Interval:          Standing Occurrences:          Expires on:            Expected by:    Associated DX:  Lateral epicondylitis of right elbow (M77.11)     Order summary:  IHP - RFL, Routine, 8 visits  Occupational  Therapy Area of Concentration: Orthopedic  Occupational Therapy Ortho: UE  If the patient can be seen sooner with a PT vs an OT, can we cancel this order and replace with a PT order? Yes         Comments:  2-3x/week for 4-6 weeks. Modalities per PT discretion. Region Specific: Elbow  elbow ROM, strengthening, modaltities as atif    Subjective: \"Everything hurts today. \"    Pain:        4/10  elbow      Objective: See treatment below. Hand Strength: (measured in pounds)   Date 5/31/22 Date 5/31/22 6/9/22 6/21/22 6/28/22   Position Right  Left  right right right    in standard position 24 52 29 31 34    in stress loaded position 14 60 20 (pain 6/10) 20 (pain 5/10) 20 (pain 6/10)       Patient Specific Functional Scale   (0=unable to perform activity, 10=able to perform activity at same level as before injury/problem)                                         (Date and score)  Activity Date 5/31/22 6/28/22   1. Hooking bra 0 0   2. Computer use 1 5   3. Brushing hair 0 5   Average score 0.5 3.3   MDC:  Average score: 3 points.  MDC: for single activity score: 2 points    Sensation: Date 5/31/22  Description of sensation: hypersensitivity at distal end of scar, where internal suture appears prominent. Elbow AROM:    Right: 15/135  Left -10/140       Provocative Tests: Date 6/28/22  (+) 4/10 pain with long finger resisted extension  (+) 3/10 pain with resisted wrist extension  (+) 3/10 pain with palpation to lateral epicondyle  (-) 0/10 pain with palpation to radial tunnel: \"a little tender\"  (-) 0/10 pain with palpation to lateral supracondylar ridge: \" a little tender\"  (-) 0/10 pain with resistance to Supinator  (-) 0/10 Pain with resistance to Anconeus        Assessment:   Patient is a 37 yo female, who has been seen in Occupational Therapy since initial eval on 5/31/22, with last treatment session on 6/28/22. The patient has attended 9/9 scheduled appointments, with 0 no shows and 0 cancellations. Focus of skilled OT has been on ROM, soft tissue flexibility, and pain, with use of interventions including therapeutic activities, therapeutic exercises, modalities such as US and hot pack, scar management, manual therapy, adapted ADL training,  neuromuscular re-ed, and taping to achieve the functional goals listed below. She has shown improvement in ROM and soft tissue, as well as pain with force tolerance in right arm, with functional improvements reported as in self care, driving, and cooking daily occupation engagement. Limitations and barriers toward progress include: wrist pain with possible ECU tendon subluxation; generalized body pain, left elbow pain. She has met the goals as noted below, reaching maximal benefit from skilled OT, and is ready for transition to home program at this time. She would benefit from further skilled OT for treatment of right wrist and left elbow if ordered by physician after future assessments and interventions.         Goals:  (to be met in 9 visits)  Patient will be independent and compliant with comprehensive HEP to maintain progress achieved in OT.  (Met)  Patient to verbalize and demonstrate understanding of aggravating and alleviating factors related to lateral epicondylitis to decrease risk of recurrence for improved functional use of right UE. (Met)  Patient will present with  strength in the right hand to that of 75% of the contralateral hand in order to be able to perform grasp of homemaking tools such as vacuum. (Not met, but improved to 65%)  Patient will present with increase in Patient Specific Function Scale to 6 or better to represent reported improvement in functional task performance. (Not met but improved)  Patient will present with sufficient ROM and strength in the right hand/arm to return to self care (doing hair) and homemaking (food prep) skill independent performance. (Met)  Patient will present with increase in force tolerance of gripping with the right hand in the \"stress position\" to 25 # of force with no more than 1/10 pain to allow for ease with gripping housework tools. (Not met)    Plan:   Discharge OT and continue with HEP and self management. Return prn for treatment to wrist if ordered. Return for skilled OT for LUE once surgery is performed.     Date 5/31/22 6/2/22 6/7/22 6/9/22 6/14/22 6/16/22 6/21/22 6/23/22 6/28/22   Visit # 1/9  2/9  3/9  4/9 5/9 6/9 7/9 8/9 9/9   Evaluation Initial  X              Manual                STM   x  x  x x x       MEM    x  x  x x        IASTM    x   Tuning fork and edge of reflex hammer  tuning fork application Tuning fork and reflex hammer handle application to lateral elbow Tuning fork and reflex hammer handle application to lateral elbow      Ther ex                 graded isometrics to wrist extensors     yellow bulk putty progressive forward   yellow bulk putty progressive forward   Yellow bulk putty Yellow bulk putty Yellow bulk puty     PROM, table slides for elbow extension    5x combined with MFR by therapist 5x combined with MFR by therapist 6x combined with MFR by therapist        Intrinsic strengthening       Lumbrical gripper Lumbrical gripper Lumbrical gripper Lumbrical gripper Lumbrical gripper     PNF diagonals      1#, 10x  PNF II  1#, 10x, II         bulk putty      yellow bulk, hands in mid position, slight supination motion, bilaterally yellow bulk, hands in mid position, slight supination motion, bilaterally  Yellow bulk, hands in mid position Yellow bulk, hands in forearm pronation Yellow bulk putty     serratus ant ex   Supine, 1#     reviewed       Lower traps ex   Standing at wall, 10x     reviewed       Middle traps ex  Standing at wall, 10x     reviewed        HEP issued See table below    tape removal  tape removal in 1-2 days Tape removal in 1-2 days Tape removal in 1-3 days as indicated. Tape removal tonight or tomorrow before MD appt  Reviewed final HEP and self management   Wrist proprioceptive re-education  Tennis ball on frisbee; hand bosu board seated and standing Tennis ball on frisbee, hand bosu board, flexbar pertubations Tennis ball on frisbee, hand bosu board, flexbar pertubations Tennis ball on frisbee, bilateral,  Tennis ball on frisbee, hand bosu board, flexbar pertubations Hand bosu board seated and standing; flexbar pertubations open- and close-chain. Hand bosu board seated and standing; flexbar pertubations open- and close-chain. Hand bosu board seated and standing; flexbar pertubations open- and close-chain. Wall washes   2 minutes         Hand gripper with pegs     FA in neutral position, taped. FA in neutral position, taped.  FA in neutral position, taped; first rung FA neutral, taped, second rung FA neutral, taped, second rung   Functional reach: distal initiation of movement   Cone stacking, graded reach forward, to right and crossing LUE to left on table Cone stacking, reaching up to top shelf; normal movement pattern facilitation Reviewed with reach and grasp of weight       Wrist PRE's     3 sets of 10 with 1# 3 sets of 10 with 1# extension only  Place and hold, 45 seconds each, 1#, 2 reps Place and hold, 45 seconds each, 1#, 2 reps                                            Rock tape applied Start formation over right lateral elbow; I strip to stabilize ECU tendon Star formation over right lateral elbow; I strip to stabilize ECU tendon Star formation over right lateral elbow; I strip to stabilize ECU tendon Star formation over right lateral elbow; double I strips to stabilize ECU tendon with FA in pronation Leukotape applied to wrist, stabilizing ECU tendon with forearm in pronation. Leukotape applied to wrist, stabilizing ECU tendon with forearm in pronation. KT tape of patient's personal applied in star formation at lateral elbow, right. Leukotape applied to wrist, stabilizing ECU tendon with forearm in pronation. KT tape of patient's personal applied in star formation at lateral elbow, right. Leukotape applied to wrist, stabilizing ECU tendon with forearm in pronation. Leukotape applied to wrist, stabilizing ECU tendon with forearm in pronation    KT tape of patient's personal tape applied in star formation, lateral elbow, right   modalities Deferred due to pt having just finished antibiotics 3 days ago. 5 minutes hot pack to extensors: lateral elbow and down to wrist 5 minutes, hot pack to lateral elbow and down to wrist 5 minutes, hot pack to lateral elbow and down to wrist 5 minutes, hot pack to lateral elbow and down to wrist 5 minutes, hot pack to lateral elbow and down to wrist Ultrasound:  3mHz  0.8w/cm2  50%  to right lateral elbow  for 8 minutes. Ultrasound:  3mHz  0.8w/cm2  50%  to right lateral elbow  for 8 minutes. Ultrasound:  3mHz  0.8w/cm2  50%  to right lateral elbow  for 8 minutes.    X= performed this date as previously outlined  HEP Date issued  Date amended Date discharged  Comments (HEP2Go code if used)   Nerve glides 5/31/22      Serratus anterior exer:  MODIFIED PLANK PLUS -  Repeat 10 Times, Hold 1 Second(s), Complete 1 Set, Perform 2 Times a Day  SCAPULAR PROTRACTION - SUPINE -  Repeat 10 Times, Hold 1 Second(s), Complete 1 Set, Perform 2 Times a Day 6/2/22   [id number]   Lower traps ex: Wall Lower Trap and Middle Trap lift off -  Repeat 10 Times, Hold 5 Seconds, Complete 2 Sets, Perform 2 Times a Day 6/2/22   [id number]   Middle traps ex: Wall Lower Trap and Middle Trap lift off -  Repeat 10 Times, Hold 5 Seconds, Complete 2 Sets, Perform 2 Times a Day 6/2/22   [id number]   SCAPULAR RETRACTIONS -  Repeat 10 Times, Hold 1 Second(s), Complete 1 Set, Perform 2 Times a Day 6/2/22   [id number]   Chicken Wings -  Repeat 10 Times, Complete 1 Set, Perform 2 Times a Day 6/2/22   [id number]     Charges: 3 TE         Total Timed Treatment: 40 minutes    Total Treatment Time: 40 minutes  Helena Canavan, CARROLL, OTR/L, CHT

## 2022-06-28 ENCOUNTER — OFFICE VISIT (OUTPATIENT)
Dept: PHYSICAL THERAPY | Age: 42
End: 2022-06-28
Attending: ORTHOPAEDIC SURGERY
Payer: MEDICAID

## 2022-06-28 DIAGNOSIS — M77.11 LATERAL EPICONDYLITIS OF RIGHT ELBOW: ICD-10-CM

## 2022-06-28 DIAGNOSIS — M77.02 MEDIAL EPICONDYLITIS OF LEFT ELBOW: ICD-10-CM

## 2022-06-28 PROCEDURE — 97110 THERAPEUTIC EXERCISES: CPT

## 2022-06-30 ENCOUNTER — APPOINTMENT (OUTPATIENT)
Dept: PHYSICAL THERAPY | Age: 42
End: 2022-06-30
Attending: ORTHOPAEDIC SURGERY

## 2022-06-30 ENCOUNTER — OFFICE VISIT (OUTPATIENT)
Dept: FAMILY MEDICINE CLINIC | Facility: CLINIC | Age: 42
End: 2022-06-30
Payer: MEDICAID

## 2022-06-30 ENCOUNTER — APPOINTMENT (OUTPATIENT)
Dept: PHYSICAL THERAPY | Age: 42
End: 2022-06-30
Attending: ORTHOPAEDIC SURGERY
Payer: MEDICAID

## 2022-06-30 VITALS
WEIGHT: 168 LBS | BODY MASS INDEX: 28.68 KG/M2 | HEIGHT: 64 IN | DIASTOLIC BLOOD PRESSURE: 89 MMHG | SYSTOLIC BLOOD PRESSURE: 130 MMHG | HEART RATE: 87 BPM

## 2022-06-30 DIAGNOSIS — M77.12 LATERAL EPICONDYLITIS OF BOTH ELBOWS: ICD-10-CM

## 2022-06-30 DIAGNOSIS — M77.02 MEDIAL EPICONDYLITIS OF ELBOW, LEFT: ICD-10-CM

## 2022-06-30 DIAGNOSIS — M79.671 BILATERAL FOOT PAIN: ICD-10-CM

## 2022-06-30 DIAGNOSIS — K59.00 CONSTIPATION, UNSPECIFIED CONSTIPATION TYPE: ICD-10-CM

## 2022-06-30 DIAGNOSIS — G89.29 CHRONIC BILATERAL LOW BACK PAIN WITHOUT SCIATICA: ICD-10-CM

## 2022-06-30 DIAGNOSIS — M79.672 BILATERAL FOOT PAIN: ICD-10-CM

## 2022-06-30 DIAGNOSIS — G56.03 BILATERAL CARPAL TUNNEL SYNDROME: ICD-10-CM

## 2022-06-30 DIAGNOSIS — G56.22 ULNAR NEURITIS, LEFT: ICD-10-CM

## 2022-06-30 DIAGNOSIS — H53.2 DOUBLE VISION: ICD-10-CM

## 2022-06-30 DIAGNOSIS — Z01.818 PRE-OP EXAMINATION: Primary | ICD-10-CM

## 2022-06-30 DIAGNOSIS — M54.50 CHRONIC BILATERAL LOW BACK PAIN WITHOUT SCIATICA: ICD-10-CM

## 2022-06-30 DIAGNOSIS — M77.11 LATERAL EPICONDYLITIS OF BOTH ELBOWS: ICD-10-CM

## 2022-06-30 PROCEDURE — 99214 OFFICE O/P EST MOD 30 MIN: CPT | Performed by: NURSE PRACTITIONER

## 2022-06-30 PROCEDURE — 3008F BODY MASS INDEX DOCD: CPT | Performed by: NURSE PRACTITIONER

## 2022-06-30 PROCEDURE — 3075F SYST BP GE 130 - 139MM HG: CPT | Performed by: NURSE PRACTITIONER

## 2022-06-30 PROCEDURE — 3079F DIAST BP 80-89 MM HG: CPT | Performed by: NURSE PRACTITIONER

## 2022-06-30 NOTE — ASSESSMENT & PLAN NOTE
Increase water and fiber intake  Add miralax 1 capful daily  Please call if symptoms worsen or are not resolving.

## 2022-06-30 NOTE — ASSESSMENT & PLAN NOTE
Pt waiting for surgical date  Pre-op orders placed-encourage to have labs done 2 weeks prior to surgery  ekg results are in placed from 4/2/2022-no need to repeat if sx done prior to 8/2/2022

## 2022-07-05 ENCOUNTER — APPOINTMENT (OUTPATIENT)
Dept: PHYSICAL THERAPY | Age: 42
End: 2022-07-05
Attending: ORTHOPAEDIC SURGERY

## 2022-07-07 ENCOUNTER — APPOINTMENT (OUTPATIENT)
Dept: PHYSICAL THERAPY | Age: 42
End: 2022-07-07
Attending: ORTHOPAEDIC SURGERY

## 2022-07-08 ENCOUNTER — HOSPITAL ENCOUNTER (OUTPATIENT)
Age: 42
Discharge: HOME | End: 2022-07-08
Payer: MEDICAID

## 2022-07-08 ENCOUNTER — APPOINTMENT (OUTPATIENT)
Dept: CT IMAGING | Age: 42
End: 2022-07-08
Attending: NURSE PRACTITIONER
Payer: MEDICAID

## 2022-07-08 VITALS
HEART RATE: 91 BPM | TEMPERATURE: 98 F | SYSTOLIC BLOOD PRESSURE: 119 MMHG | RESPIRATION RATE: 20 BRPM | DIASTOLIC BLOOD PRESSURE: 78 MMHG | OXYGEN SATURATION: 100 %

## 2022-07-08 DIAGNOSIS — K52.9 ENTERITIS: Primary | ICD-10-CM

## 2022-07-08 LAB
#MXD IC: 0.4 X10ˆ3/UL (ref 0.1–1)
B-HCG UR QL: NEGATIVE
BILIRUB UR QL STRIP: NEGATIVE
BUN BLD-MCNC: 14 MG/DL (ref 7–18)
CHLORIDE BLD-SCNC: 105 MMOL/L (ref 98–112)
CLARITY UR: CLEAR
CO2 BLD-SCNC: 25 MMOL/L (ref 21–32)
COLOR UR: YELLOW
CREAT BLD-MCNC: 0.6 MG/DL
GLUCOSE BLD-MCNC: 140 MG/DL (ref 70–99)
GLUCOSE UR STRIP-MCNC: NEGATIVE MG/DL
HCT VFR BLD AUTO: 38 %
HCT VFR BLD CALC: 40 %
HGB BLD-MCNC: 12.4 G/DL
ISTAT IONIZED CALCIUM FOR CHEM 8: 1.11 MMOL/L (ref 1.12–1.32)
KETONES UR STRIP-MCNC: NEGATIVE MG/DL
LEUKOCYTE ESTERASE UR QL STRIP: NEGATIVE
LYMPHOCYTES # BLD AUTO: 3.3 X10ˆ3/UL (ref 1–4)
LYMPHOCYTES NFR BLD AUTO: 41.6 %
MCH RBC QN AUTO: 27.9 PG (ref 26–34)
MCHC RBC AUTO-ENTMCNC: 32.6 G/DL (ref 31–37)
MCV RBC AUTO: 85.4 FL (ref 80–100)
MIXED CELL %: 4.5 %
NEUTROPHILS # BLD AUTO: 4.2 X10ˆ3/UL (ref 1.5–7.7)
NEUTROPHILS NFR BLD AUTO: 53.9 %
NITRITE UR QL STRIP: NEGATIVE
PH UR STRIP: 5 [PH]
PLATELET # BLD AUTO: 340 X10ˆ3/UL (ref 150–450)
POTASSIUM BLD-SCNC: 4.8 MMOL/L (ref 3.6–5.1)
PROT UR STRIP-MCNC: NEGATIVE MG/DL
RBC # BLD AUTO: 4.45 X10ˆ6/UL
SODIUM BLD-SCNC: 138 MMOL/L (ref 136–145)
SP GR UR STRIP: >=1.03
UROBILINOGEN UR STRIP-ACNC: <2 MG/DL
WBC # BLD AUTO: 7.9 X10ˆ3/UL (ref 4–11)

## 2022-07-08 PROCEDURE — 85025 COMPLETE CBC W/AUTO DIFF WBC: CPT | Performed by: NURSE PRACTITIONER

## 2022-07-08 PROCEDURE — 99214 OFFICE O/P EST MOD 30 MIN: CPT

## 2022-07-08 PROCEDURE — 81025 URINE PREGNANCY TEST: CPT

## 2022-07-08 PROCEDURE — 99215 OFFICE O/P EST HI 40 MIN: CPT

## 2022-07-08 PROCEDURE — 96361 HYDRATE IV INFUSION ADD-ON: CPT

## 2022-07-08 PROCEDURE — 80047 BASIC METABLC PNL IONIZED CA: CPT

## 2022-07-08 PROCEDURE — 96374 THER/PROPH/DIAG INJ IV PUSH: CPT

## 2022-07-08 PROCEDURE — 74177 CT ABD & PELVIS W/CONTRAST: CPT | Performed by: NURSE PRACTITIONER

## 2022-07-08 PROCEDURE — 81002 URINALYSIS NONAUTO W/O SCOPE: CPT

## 2022-07-08 RX ORDER — ONDANSETRON 4 MG/1
4 TABLET, ORALLY DISINTEGRATING ORAL EVERY 4 HOURS PRN
Qty: 10 TABLET | Refills: 0 | Status: SHIPPED | OUTPATIENT
Start: 2022-07-08

## 2022-07-08 RX ORDER — DOCUSATE SODIUM 100 MG/1
100 CAPSULE, LIQUID FILLED ORAL 2 TIMES DAILY PRN
Qty: 30 CAPSULE | Refills: 0 | Status: SHIPPED | OUTPATIENT
Start: 2022-07-08

## 2022-07-08 RX ORDER — ONDANSETRON 2 MG/ML
4 INJECTION INTRAMUSCULAR; INTRAVENOUS ONCE
Status: COMPLETED | OUTPATIENT
Start: 2022-07-08 | End: 2022-07-08

## 2022-07-08 RX ORDER — HYDROCODONE BITARTRATE AND ACETAMINOPHEN 5; 325 MG/1; MG/1
1 TABLET ORAL EVERY 6 HOURS PRN
Qty: 10 TABLET | Refills: 0 | Status: SHIPPED | OUTPATIENT
Start: 2022-07-08

## 2022-07-08 RX ORDER — SODIUM CHLORIDE 9 MG/ML
1000 INJECTION, SOLUTION INTRAVENOUS ONCE
Status: COMPLETED | OUTPATIENT
Start: 2022-07-08 | End: 2022-07-08

## 2022-07-08 NOTE — ED INITIAL ASSESSMENT (HPI)
Patient reports 3 day hx of left sided abdominal pain. Reports constipation, has been taking glycerin suppositories. Reports small bm this morning.

## 2022-07-10 ENCOUNTER — TELEPHONE (OUTPATIENT)
Dept: FAMILY MEDICINE CLINIC | Facility: CLINIC | Age: 42
End: 2022-07-10

## 2022-07-11 NOTE — TELEPHONE ENCOUNTER
Requested Problem Removals Date Noted Reported By  Comments   Irregular menses 10/18/2019 Andrea Lepe

## 2022-07-12 ENCOUNTER — APPOINTMENT (OUTPATIENT)
Dept: PHYSICAL THERAPY | Age: 42
End: 2022-07-12
Attending: ORTHOPAEDIC SURGERY

## 2022-07-13 ENCOUNTER — TELEPHONE (OUTPATIENT)
Dept: NEUROLOGY | Facility: CLINIC | Age: 42
End: 2022-07-13

## 2022-07-13 ENCOUNTER — HOSPITAL ENCOUNTER (OUTPATIENT)
Dept: MRI IMAGING | Age: 42
Discharge: HOME | End: 2022-07-13
Attending: PHYSICAL MEDICINE & REHABILITATION
Payer: MEDICAID

## 2022-07-13 DIAGNOSIS — M19.071 OSTEOARTHRITIS OF TOE JOINT, RIGHT: ICD-10-CM

## 2022-07-13 DIAGNOSIS — M79.10 MYALGIA: ICD-10-CM

## 2022-07-13 DIAGNOSIS — M25.78 DEGENERATION OF INTERVERTEBRAL DISC OF CERVICAL REGION WITH OSTEOPHYTE OF CERVICAL VERTEBRA: ICD-10-CM

## 2022-07-13 DIAGNOSIS — R53.1 WEAKNESS: ICD-10-CM

## 2022-07-13 DIAGNOSIS — M50.30 DEGENERATION OF INTERVERTEBRAL DISC OF CERVICAL REGION WITH OSTEOPHYTE OF CERVICAL VERTEBRA: ICD-10-CM

## 2022-07-13 DIAGNOSIS — M54.12 CERVICAL RADICULOPATHY: ICD-10-CM

## 2022-07-13 DIAGNOSIS — G89.29 CHRONIC TOE PAIN, RIGHT FOOT: ICD-10-CM

## 2022-07-13 DIAGNOSIS — M79.671 RIGHT FOOT PAIN: ICD-10-CM

## 2022-07-13 DIAGNOSIS — R20.0 NUMBNESS IN FEET: ICD-10-CM

## 2022-07-13 DIAGNOSIS — M51.37 DDD (DEGENERATIVE DISC DISEASE), LUMBOSACRAL: ICD-10-CM

## 2022-07-13 DIAGNOSIS — R20.0 NUMBNESS IN BOTH HANDS: ICD-10-CM

## 2022-07-13 DIAGNOSIS — M47.816 LUMBAR SPONDYLOSIS: ICD-10-CM

## 2022-07-13 DIAGNOSIS — M79.674 CHRONIC TOE PAIN, RIGHT FOOT: ICD-10-CM

## 2022-07-13 DIAGNOSIS — M50.30 DDD (DEGENERATIVE DISC DISEASE), CERVICAL: ICD-10-CM

## 2022-07-13 PROCEDURE — 72148 MRI LUMBAR SPINE W/O DYE: CPT | Performed by: PHYSICAL MEDICINE & REHABILITATION

## 2022-07-13 PROCEDURE — 72141 MRI NECK SPINE W/O DYE: CPT | Performed by: PHYSICAL MEDICINE & REHABILITATION

## 2022-07-13 PROCEDURE — 70551 MRI BRAIN STEM W/O DYE: CPT | Performed by: PHYSICAL MEDICINE & REHABILITATION

## 2022-07-14 ENCOUNTER — APPOINTMENT (OUTPATIENT)
Dept: PHYSICAL THERAPY | Age: 42
End: 2022-07-14
Attending: ORTHOPAEDIC SURGERY

## 2022-07-19 ENCOUNTER — APPOINTMENT (OUTPATIENT)
Dept: PHYSICAL THERAPY | Age: 42
End: 2022-07-19
Attending: ORTHOPAEDIC SURGERY

## 2022-07-21 ENCOUNTER — APPOINTMENT (OUTPATIENT)
Dept: PHYSICAL THERAPY | Age: 42
End: 2022-07-21
Attending: ORTHOPAEDIC SURGERY

## 2022-07-26 ENCOUNTER — APPOINTMENT (OUTPATIENT)
Dept: PHYSICAL THERAPY | Age: 42
End: 2022-07-26
Attending: ORTHOPAEDIC SURGERY

## 2022-07-28 ENCOUNTER — APPOINTMENT (OUTPATIENT)
Dept: PHYSICAL THERAPY | Age: 42
End: 2022-07-28
Attending: ORTHOPAEDIC SURGERY

## 2022-07-29 ENCOUNTER — APPOINTMENT (OUTPATIENT)
Dept: PHYSICAL THERAPY | Age: 42
End: 2022-07-29
Attending: PHYSICAL MEDICINE & REHABILITATION
Payer: MEDICAID

## 2022-07-29 NOTE — TELEPHONE ENCOUNTER
Dirk Landry for off work note, 6 weeks, will update as needed. [facility], a part of [facility]  [id number] [facility]. [facility], [facility] Hedy Miguel                                                    Physical Therapy  Daily Note    Patient Name: Valeria Gong  Date: 2022    /: 2016  [x]  Patient  Verified  Payor: BLUE CROSS / Plan: Treinta Y Mikhail [id number] PPO / Product Type: PPO /    In time: 1300 Out time: 1400  Total Treatment Time (min): 60  Total Timed Codes (min): 60    Treatment Area: Muscle weakness [M62.81]  Unspecified lack of coordination [R27.9]  Unspecified abnormalities of gait and mobility [R26.9]    Visit Type:  [] Intensive  [x] Outpatient  []  Orthotic Clinic Visit   []  Equipment Clinic Visit  [] Virtual Visit    SUBJECTIVE  Pain Level FLACC scale    Start of Session  During Session End of Session    Face  0 0 0   Legs  0 0 0   Activity  0 0 0   Cry  0 0 0   Consolability  0 0 0   Total  0 0 0        Any medication changes, allergies to medications, adverse drug reactions, diagnosis change, or new procedure performed?: [x] No    [] Yes (see summary sheet for update)  Subjective functional status/changes:   [x] No changes reported     Francisco J arrived to PT with his Mom. She was present for the entire session.     OBJECTIVE      30 min Therapeutic Exercise:  [x] See flow sheet:   Rationale: increase ROM, increase strength, improve coordination, improve balance and increase proprioception to improve the patients ability to achieve their functional goals        min Neuromuscular Re-education:  [x]  See flow sheet    Rationale: Improve muscle re-education of movement, balance, coordination, kinesthetic sense, posture, and proprioception to improve the patient's ability to achieve their functional goals     min Manual Therapy:  See flowsheet   Rationale: decrease pain, increase ROM, increase tissue extensibility, decrease trigger points and increase postural awareness to work towards their functional goals     30 min Gait Training: See flow sheet        min Therapeutic Activities: See Flowsheet   Rationale: to use dynamic activity to improve functional performance and transfers          With   [] TE   [] neuro   [x] other: throughout the session Patient Education: [] Review HEP    [] Progressed/Changed HEP based on:   [] positioning   [] body mechanics   [] transfers   [] heat/ice application  [x]  Reviewed session with caregiver throughout the session  [] other:       Objective/Functional Activities: see functional boxes below     * total motion release abbreviated as TMR in boxes below  Vestibular - swing each direction for 1 min   - spin x 10 each direction   Rhythmic Movements / Reflex Integration ---   Fredo Gold --- stagger stance B 2 x 1 min, 18 HZ, holding bars   Universal Exercise Unit (UEU)/Strengthening Activities -    Core - tailor sit BOSU with trunk rotation and over head reaching  -tall kneel with hands on BoSU, working on reaching with one hand and stabilizing with the other  -Stand on BOSU with holding yellow bungee, sit to stands on to BOsU from bench x 5 reps     Tall / Half Kneel ---1/2 kneel to stand with UE support, min assist x 8 reps of BOsU   Transitions -  -above   Stairs -   Standing - ind with walking   -sit to stands with one foot propped, x 2 B   Gait/pre-gait activities - with close guarding-LT at back of his shirt or suit for varying distances throughout the gym with free walk in the gym as well as with walking over or around obstacles with close guarding-min A waiting for him to see and navigate the obstacle  -1 HHA x 30 feet  -treadmill x 5 min, 1.0 mph. 2% incline   FES/Xcite ---   Other ---         ASSESSMENT/Changes in Function:   Francisco J is walking with improved control and a lower steppage gait. He is using improved balance overall with gait. He demonstrated improved control and balance with staying uprgiht after rising to standing.  He continues to have a hard time and hesitate when going throu  aBle to hold NBOS for several seconds in standing, but reverts quickly back to [facility] and holds weight posterior unless cued to do so. Reliant on tactile cues for progression towards normal gait pattern. .   Con't with POC. [x]  See Plan of Care  []  See progress note/recertification  []  See Discharge Summary    Patient's tolerance to therapy:  [x]  good  []  fair  [] increased fatigue  [] other:     Behaviors: some fussing with vibration sensors on his head      Short term goals: to be reassessed and revised as necessary:     Patient will: Status: TFA:   Rise to stand on his own through plantigrade or a squat with LT to help initiate 2/3 times to increase independence with mobiity Partially Met : requires close guarding-CGA.  Still times he moves too far backward     Assessed on:  5/6/22 3/9/21-8/28/22   Move from standing down to the ground onto his hands and knees or forward through a squat with close guarding 2/3 times for improved safety and efficiency with independent mobility Partially Met  : more consistent when directed or with repetition of an activity      Assessed on:  5/6/22 3/9/21-9/8/22   Stand on his own during play for 1-2 min without LOB 2/3 times PM- need to time, but isn't consistently standing as he tends to want to walk more than stand    Assessed on 5/6/22 7/9/21-8/28/22   Descend a curb or step with close guarding-LT to move about his environment 2/3 times safely PM- curb 2 HHA vs step inside which tends to be LT-CGA    Assessed on 5/6/22 4/11/22-9/11/22   Stand and reach for toy on the ground and play in a squat or return to standing vs sitting back on the ground for increased independence with play and stability in transitions 2/3 times PM- still tends to primarily want to bring bottom back to ground unless given at minimum CGA    Assessed on 5/6/22 4/11/22-9/11/22   Ascend 4 steps using 1 handrail with close guard only as seen in 2/3 trials in order to improve independence with negotiating his home environment. Partially Met  :  Currently using 2 hand rails with close guarding only. 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 2/3 times Met at last IT session 10/25/21-11/17/21   Take 6-8 independent steps between people or surfaces for increased independence with movement about his house or classroom Met 7/9/21-2/17/22   Move from floor to  the walker with close guarding for improved independence in the walker 2/3 times Discontinue 3/26/21-3/11/22   Move from posterior walker to the ground in a forward motion safely with close guarding for improved independence in the walker 2/3 times Discontinue 3/26/21-4/11/22         Long term goal: TFA:  9/29/21-9/29/22  Francisco J will demonstrate improved total body strength, balance, ability to perform transitions, improved gait, and sustained activity tolerance in order to maximize his safety and independence with all functional mobility in his home and community environments.  Partially Met     PLAN  [x]  Upgrade activities as tolerated     [x]  Continue plan of care  []  Update interventions per flow sheet       []  Discharge due to:_  []  Other:_          Natividad Spence, PT, DPT 7/29/2022

## 2022-08-02 ENCOUNTER — APPOINTMENT (OUTPATIENT)
Dept: PHYSICAL THERAPY | Age: 42
End: 2022-08-02
Attending: PHYSICAL MEDICINE & REHABILITATION
Payer: MEDICAID

## 2022-08-04 ENCOUNTER — APPOINTMENT (OUTPATIENT)
Dept: PHYSICAL THERAPY | Age: 42
End: 2022-08-04
Attending: PHYSICAL MEDICINE & REHABILITATION
Payer: MEDICAID

## 2022-08-09 ENCOUNTER — APPOINTMENT (OUTPATIENT)
Dept: PHYSICAL THERAPY | Age: 42
End: 2022-08-09
Attending: PHYSICAL MEDICINE & REHABILITATION
Payer: MEDICAID

## 2022-08-11 ENCOUNTER — APPOINTMENT (OUTPATIENT)
Dept: PHYSICAL THERAPY | Age: 42
End: 2022-08-11
Attending: PHYSICAL MEDICINE & REHABILITATION
Payer: MEDICAID

## 2022-08-16 ENCOUNTER — HOSPITAL ENCOUNTER (OUTPATIENT)
Dept: GENERAL RADIOLOGY | Facility: HOSPITAL | Age: 42
Discharge: HOME | End: 2022-08-16
Attending: ORTHOPAEDIC SURGERY
Payer: MEDICAID

## 2022-08-16 ENCOUNTER — OFFICE VISIT (OUTPATIENT)
Dept: ORTHOPEDICS CLINIC | Facility: CLINIC | Age: 42
End: 2022-08-16
Payer: OTHER MISCELLANEOUS

## 2022-08-16 ENCOUNTER — OFFICE VISIT (OUTPATIENT)
Dept: PHYSICAL THERAPY | Age: 42
End: 2022-08-16
Attending: PHYSICAL MEDICINE & REHABILITATION
Payer: MEDICAID

## 2022-08-16 DIAGNOSIS — M48.061 LUMBAR FORAMINAL STENOSIS: ICD-10-CM

## 2022-08-16 DIAGNOSIS — M25.532 BILATERAL WRIST PAIN: ICD-10-CM

## 2022-08-16 DIAGNOSIS — M54.59 MECHANICAL LOW BACK PAIN: ICD-10-CM

## 2022-08-16 DIAGNOSIS — M47.816 FACET SYNDROME, LUMBAR: ICD-10-CM

## 2022-08-16 DIAGNOSIS — S63.091D SUBLUXATION OF EXTENSOR CARPI ULNARIS TENDON, RIGHT, SUBSEQUENT ENCOUNTER: ICD-10-CM

## 2022-08-16 DIAGNOSIS — M25.531 BILATERAL WRIST PAIN: ICD-10-CM

## 2022-08-16 DIAGNOSIS — M47.816 LUMBAR SPONDYLOSIS: ICD-10-CM

## 2022-08-16 DIAGNOSIS — M54.12 CERVICAL RADICULOPATHY: ICD-10-CM

## 2022-08-16 DIAGNOSIS — G56.02 CARPAL TUNNEL SYNDROME, LEFT: Primary | ICD-10-CM

## 2022-08-16 DIAGNOSIS — M51.37 DDD (DEGENERATIVE DISC DISEASE), LUMBOSACRAL: ICD-10-CM

## 2022-08-16 DIAGNOSIS — R20.0 NUMBNESS IN FEET: ICD-10-CM

## 2022-08-16 DIAGNOSIS — M50.30 DDD (DEGENERATIVE DISC DISEASE), CERVICAL: ICD-10-CM

## 2022-08-16 DIAGNOSIS — M54.59 LUMBAR TRIGGER POINT SYNDROME: ICD-10-CM

## 2022-08-16 DIAGNOSIS — M79.10 MYALGIA: ICD-10-CM

## 2022-08-16 DIAGNOSIS — R20.0 NUMBNESS IN BOTH HANDS: ICD-10-CM

## 2022-08-16 DIAGNOSIS — M79.671 RIGHT FOOT PAIN: ICD-10-CM

## 2022-08-16 DIAGNOSIS — R53.1 WEAKNESS: ICD-10-CM

## 2022-08-16 PROCEDURE — 99244 OFF/OP CNSLTJ NEW/EST MOD 40: CPT | Performed by: ORTHOPAEDIC SURGERY

## 2022-08-16 PROCEDURE — 73110 X-RAY EXAM OF WRIST: CPT | Performed by: ORTHOPAEDIC SURGERY

## 2022-08-16 PROCEDURE — 97110 THERAPEUTIC EXERCISES: CPT

## 2022-08-16 PROCEDURE — 97162 PT EVAL MOD COMPLEX 30 MIN: CPT

## 2022-08-16 RX ORDER — CEPHALEXIN 500 MG/1
CAPSULE ORAL
Qty: 4 CAPSULE | Refills: 0 | Status: SHIPPED | OUTPATIENT
Start: 2022-08-16

## 2022-08-18 ENCOUNTER — OFFICE VISIT (OUTPATIENT)
Dept: PHYSICAL THERAPY | Age: 42
End: 2022-08-18
Attending: PHYSICAL MEDICINE & REHABILITATION
Payer: MEDICAID

## 2022-08-18 ENCOUNTER — HOSPITAL ENCOUNTER (OUTPATIENT)
Dept: GENERAL RADIOLOGY | Facility: HOSPITAL | Age: 42
Discharge: HOME | End: 2022-08-18
Attending: ORTHOPAEDIC SURGERY
Payer: MEDICAID

## 2022-08-18 ENCOUNTER — OFFICE VISIT (OUTPATIENT)
Dept: ORTHOPEDICS CLINIC | Facility: CLINIC | Age: 42
End: 2022-08-18
Payer: MEDICAID

## 2022-08-18 DIAGNOSIS — M48.061 LUMBAR FORAMINAL STENOSIS: ICD-10-CM

## 2022-08-18 DIAGNOSIS — R53.1 WEAKNESS: ICD-10-CM

## 2022-08-18 DIAGNOSIS — M25.522 LEFT ELBOW PAIN: ICD-10-CM

## 2022-08-18 DIAGNOSIS — M54.59 MECHANICAL LOW BACK PAIN: ICD-10-CM

## 2022-08-18 DIAGNOSIS — M79.10 MYALGIA: ICD-10-CM

## 2022-08-18 DIAGNOSIS — M77.02 MEDIAL EPICONDYLITIS OF ELBOW, LEFT: Primary | ICD-10-CM

## 2022-08-18 DIAGNOSIS — R20.0 NUMBNESS IN FEET: ICD-10-CM

## 2022-08-18 DIAGNOSIS — M54.12 CERVICAL RADICULOPATHY: ICD-10-CM

## 2022-08-18 DIAGNOSIS — M47.816 FACET SYNDROME, LUMBAR: ICD-10-CM

## 2022-08-18 DIAGNOSIS — R20.0 NUMBNESS IN BOTH HANDS: ICD-10-CM

## 2022-08-18 DIAGNOSIS — M54.59 LUMBAR TRIGGER POINT SYNDROME: ICD-10-CM

## 2022-08-18 DIAGNOSIS — M50.30 DDD (DEGENERATIVE DISC DISEASE), CERVICAL: ICD-10-CM

## 2022-08-18 DIAGNOSIS — M79.671 RIGHT FOOT PAIN: ICD-10-CM

## 2022-08-18 DIAGNOSIS — M51.37 DDD (DEGENERATIVE DISC DISEASE), LUMBOSACRAL: ICD-10-CM

## 2022-08-18 DIAGNOSIS — M47.816 LUMBAR SPONDYLOSIS: ICD-10-CM

## 2022-08-18 PROCEDURE — 73080 X-RAY EXAM OF ELBOW: CPT | Performed by: ORTHOPAEDIC SURGERY

## 2022-08-18 PROCEDURE — 99213 OFFICE O/P EST LOW 20 MIN: CPT | Performed by: ORTHOPAEDIC SURGERY

## 2022-08-18 PROCEDURE — 97110 THERAPEUTIC EXERCISES: CPT

## 2022-08-18 NOTE — PROGRESS NOTES
Diagnosis:  Myalgia (M79.10)  Lumbar spondylosis (M47.816)  DDD (degenerative disc disease), lumbosacral (M51.37)  Mechanical low back pain (M54.59)  Facet syndrome, lumbar (M47.816)  Lumbar trigger point syndrome (M54.59)  Lumbar foraminal stenosis (M48.061)  Numbness in both hands (R20.0)  Right foot pain (M79.671)  DDD (degenerative disc disease), cervical (M50.30)  Cervical radiculopathy (M54.12)  Weakness (R53.1)  Numbness in feet (R20.0)  Insurance (Authorized # of Visits):  [place]   (15 auth  Exp 10/15)  Authorizing Physician: Dr. Trenna Barthel  Next MD visit: none scheduled  Fall Risk: standard         Precautions: n/a           Cancellations: 0   No Shows: 0  Most recent Evaluation/Progress note: Visit 1 on 8/18/22  Rehab Potential:excellent  FOTO: 32/100  Modified Oswestry: 60.7    Subjective: Pt reports pain not too bad today. Feels very stiff. Pain: 4/10    Assessment: Reviewed HEP and progressed gentle mobility and strengthening. Initiated manual interventions. Continued limitations in core strength. Objective: (See Flowsheet Below)  Goals: To be met in 8-10 sessions  1. Pt will verbalize and demo compliance with HEP at least 75% of the time to allow for independent management of symptoms at discharge. 2. Pt will demo improved lumbar spine AROM by at least 25% to be able to bend and turn with increased ease  3. Pt will demo improved core and pelvic stability and strength to at least 4/5 for improved stability with functional mobility  4. Pt will demo proper mechanics for bending/lifting to decrease risk for re-injury  5. Pt will be able to sleep normally in bed without waking up in pain     Date: 8/18/22  Tx #: 2 Date: Tx#: Date:   Tx#:   Therapeutic exercises NuStep lvl [id number] UEs/LEs    Supine pelvic clocks 12<>6 20x    Supine TrA + hip add with ball 51d6dbi    Supine TrA + hip abd RTB 48q3zil    Supine SLR - stopped secondary to LBP    Supine TrA march 05f8giq    sidely thoracic rotations BLEs flexed 10x R/L     Manual Therapy Prone: STM/theragun to B thoracolumbar paraspinals      Neuromuscular Re-education        Current HEP:   8/16: supine pelvic clocks 12<>6, supine TrA + hip add, seated piriformis stretch 31w2amz - handout issued  8/18: sidely thoracic rotation, supine hip abd RTB - handout issued    Plan: Assess effect of HEP progressions. Continue with progressive mobility and strengthening. Charges:  TherEx x3       Total Timed Treatment: 42 min  Total Treatment Time: 42 min

## 2022-08-19 ENCOUNTER — TELEPHONE (OUTPATIENT)
Dept: ORTHOPEDICS CLINIC | Facility: CLINIC | Age: 42
End: 2022-08-19

## 2022-08-19 NOTE — TELEPHONE ENCOUNTER
Called to offer surgery dates. Patient states that she will call me back.      Available surgery dates:   9/15; 9/16; 9/22; 9/23; 9/29

## 2022-08-22 NOTE — TELEPHONE ENCOUNTER
Called to offer patient available surgery dates. Patient stated that she has a bad connection and asked me to call her back. I called back and got no answer. LMTCB for to let me know her preferred date for surgery. Available dates were given when I spoke with her on Friday morning.

## 2022-08-23 ENCOUNTER — TELEPHONE (OUTPATIENT)
Dept: PHYSICAL THERAPY | Facility: HOSPITAL | Age: 42
End: 2022-08-23

## 2022-08-23 ENCOUNTER — APPOINTMENT (OUTPATIENT)
Dept: PHYSICAL THERAPY | Age: 42
End: 2022-08-23
Attending: PHYSICAL MEDICINE & REHABILITATION
Payer: MEDICAID

## 2022-08-23 NOTE — TELEPHONE ENCOUNTER
Type of surgery:  Left Carpal Tunnel Release  Date: 9/29/22  Location: [facility]  Medical Clearance:      *Medical: No      *Dental: No      *Other:  Prior Authorization Status: Pending  Workers Comp:  Aníbal/Roshan:  [place]: Yes  POV: 11/11/22

## 2022-08-25 ENCOUNTER — OFFICE VISIT (OUTPATIENT)
Dept: PHYSICAL THERAPY | Age: 42
End: 2022-08-25
Attending: PHYSICAL MEDICINE & REHABILITATION
Payer: MEDICAID

## 2022-08-25 DIAGNOSIS — R20.0 NUMBNESS IN FEET: ICD-10-CM

## 2022-08-25 DIAGNOSIS — M54.59 MECHANICAL LOW BACK PAIN: ICD-10-CM

## 2022-08-25 DIAGNOSIS — R20.0 NUMBNESS IN BOTH HANDS: ICD-10-CM

## 2022-08-25 DIAGNOSIS — M48.061 LUMBAR FORAMINAL STENOSIS: ICD-10-CM

## 2022-08-25 DIAGNOSIS — M79.671 RIGHT FOOT PAIN: ICD-10-CM

## 2022-08-25 DIAGNOSIS — M79.10 MYALGIA: ICD-10-CM

## 2022-08-25 DIAGNOSIS — M51.37 DDD (DEGENERATIVE DISC DISEASE), LUMBOSACRAL: ICD-10-CM

## 2022-08-25 DIAGNOSIS — M54.59 LUMBAR TRIGGER POINT SYNDROME: ICD-10-CM

## 2022-08-25 DIAGNOSIS — M54.12 CERVICAL RADICULOPATHY: ICD-10-CM

## 2022-08-25 DIAGNOSIS — M47.816 LUMBAR SPONDYLOSIS: ICD-10-CM

## 2022-08-25 DIAGNOSIS — M50.30 DDD (DEGENERATIVE DISC DISEASE), CERVICAL: ICD-10-CM

## 2022-08-25 DIAGNOSIS — R53.1 WEAKNESS: ICD-10-CM

## 2022-08-25 DIAGNOSIS — M47.816 FACET SYNDROME, LUMBAR: ICD-10-CM

## 2022-08-25 PROCEDURE — 97110 THERAPEUTIC EXERCISES: CPT

## 2022-08-25 NOTE — PROGRESS NOTES
Diagnosis:  Myalgia (M79.10)  Lumbar spondylosis (M47.816)  DDD (degenerative disc disease), lumbosacral (M51.37)  Mechanical low back pain (M54.59)  Facet syndrome, lumbar (M47.816)  Lumbar trigger point syndrome (M54.59)  Lumbar foraminal stenosis (M48.061)  Numbness in both hands (R20.0)  Right foot pain (M79.671)  DDD (degenerative disc disease), cervical (M50.30)  Cervical radiculopathy (M54.12)  Weakness (R53.1)  Numbness in feet (R20.0)  Insurance (Authorized # of Visits):  [place]   (15 auth  Exp 10/15)  Authorizing Physician: Dr. Novak   Next MD visit: none scheduled  Fall Risk: standard         Precautions: n/a           Cancellations: 1   No Shows: 0  Most recent Evaluation/Progress note: Visit 1 on 8/18/22  Rehab Potential:excellent  FOTO: 32/100  Modified Oswestry: 60.7    Subjective: Pt reports on Monday she slept on her back and woke up with a fever. Her back was very stiff following. Pt reports feeling a flareup coming on. She got surgery date. She will have surgery on 9/29. Plans to finish with PT then and begin OT after surgery. She would like to hold on PT for 2 weeks as PT goes on vacation due to feeling flareup coming on that typically lasts 2 weeks. Pain: 4/10    Assessment: Focused on very gentle mobility and strengthening this session as pt with limited tolerance for exercises secondary to feeling of oncoming flareup. Greater weakness/fatigue on R vs L. Objective: (See Flowsheet Below)  Goals: To be met in 8-10 sessions  1. Pt will verbalize and demo compliance with HEP at least 75% of the time to allow for independent management of symptoms at discharge. - in progress  2. Pt will demo improved lumbar spine AROM by at least 25% to be able to bend and turn with increased ease - in progress  3. Pt will demo improved core and pelvic stability and strength to at least 4/5 for improved stability with functional mobility - in progress  4.  Pt will demo proper mechanics for bending/lifting to decrease risk for re-injury - in progress  5. Pt will be able to sleep normally in bed without waking up in pain - in progress     Date: 8/18/22  Tx #: 2 Date: 8/25/22  Tx#: 3 Date: Tx#:   Therapeutic exercises NuStep lvl [id number] UEs/LEs    Supine pelvic clocks 12<>6 20x    Supine TrA + hip add with ball 75s7gxr    Supine TrA + hip abd RTB 09t7sba    Supine SLR - stopped secondary to LBP    Supine TrA march 62v6bfi    sidely thoracic rotations BLEs flexed 10x R/L NuStep lvl 8s1nnyr UEs/LEs    Supine LTR 10x R/L    Supine pelvic clocks 12<>6 10x R/L    Supine SKTC 10x R/L    Supine sciatic nerve glide 10x R/L    Supine hip add 47e4tez    Seated piriformis stretch 93b0uzb    Seated flexion 65cm ball 10x fwd    Shuttle (B) leg press 4B 10x    Shuttle (SL) leg press 3B 10x R/L      Manual Therapy Prone: STM/theragun to B thoracolumbar paraspinals      Neuromuscular Re-education        Current HEP:   8/16: supine pelvic clocks 12<>6, supine TrA + hip add, seated piriformis stretch 31n9tnx - handout issued  8/18: sidely thoracic rotation, supine hip abd RTB - handout issued  8/25: seated piriformis, seated flexion ball - handout issued    Plan: Assess effect of HEP progressions. Continue with progressive mobility and strengthening as tolerated    Charges:  TherEx x3       Total Timed Treatment: 42 min  Total Treatment Time: 42 min

## 2022-08-31 ENCOUNTER — HOSPITAL ENCOUNTER (OUTPATIENT)
Dept: MRI IMAGING | Facility: HOSPITAL | Age: 42
Discharge: HOME | End: 2022-08-31
Attending: ORTHOPAEDIC SURGERY
Payer: MEDICAID

## 2022-08-31 DIAGNOSIS — M77.02 MEDIAL EPICONDYLITIS OF ELBOW, LEFT: ICD-10-CM

## 2022-08-31 PROCEDURE — 73221 MRI JOINT UPR EXTREM W/O DYE: CPT | Performed by: ORTHOPAEDIC SURGERY

## 2022-09-09 ENCOUNTER — TELEPHONE (OUTPATIENT)
Dept: NEUROLOGY | Facility: CLINIC | Age: 42
End: 2022-09-09

## 2022-09-09 NOTE — TELEPHONE ENCOUNTER
Trigger point injections to the neck and upper back CPT CODE: 02316(06608 if needed)     [facility] for authorization for above. Spoke to  Home	[facility] who states no authorization is required.      Reference call#  [phone]     Notified MELISSA Approved Referral for scheduling

## 2022-09-13 ENCOUNTER — OFFICE VISIT (OUTPATIENT)
Dept: PHYSICAL THERAPY | Age: 42
End: 2022-09-13
Attending: PHYSICAL MEDICINE & REHABILITATION
Payer: MEDICAID

## 2022-09-13 PROCEDURE — 97110 THERAPEUTIC EXERCISES: CPT

## 2022-09-13 PROCEDURE — 97140 MANUAL THERAPY 1/> REGIONS: CPT

## 2022-09-13 NOTE — PROGRESS NOTES
Diagnosis:  Myalgia (M79.10)  Lumbar spondylosis (M47.816)  DDD (degenerative disc disease), lumbosacral (M51.37)  Mechanical low back pain (M54.59)  Facet syndrome, lumbar (M47.816)  Lumbar trigger point syndrome (M54.59)  Lumbar foraminal stenosis (M48.061)  Numbness in both hands (R20.0)  Right foot pain (M79.671)  DDD (degenerative disc disease), cervical (M50.30)  Cervical radiculopathy (M54.12)  Weakness (R53.1)  Numbness in feet (R20.0)  Insurance (Authorized # of Visits):  [place]   (15 auth  Exp 10/15)  Authorizing Physician: Dr. Luis Ayoub MD visit: none scheduled  Fall Risk: standard         Precautions: n/a           Cancellations: 1   No Shows: 0  Most recent Evaluation/Progress note: Visit 1 on 8/18/22  Rehab Potential:excellent  FOTO: 32/100  Modified Oswestry: 60.7    Subjective: Pt reports her back has been bothering her a lot. She had a flareup the past couple weeks. She is scheduled for her elbow surgery on 9/29. Pain: 4/10    Assessment: Pt unable to tolerate prone, so focused on manual interventions in sidely. Pt reported \"good tingling\" sensation with gentle manual traction at lumbar and lumbosacral joints. Pt reported feeling \"lighter\" by end of session. Objective: (See Flowsheet Below)  Trunk         Pain (+/-)   Flexion 50% limited + lower back   Extension 75% limited + pressure lower back and down legs   R Sidebend Full -   L Sidebend Full -   R Rotation 25% limited +   L Rotation 25% limited  +        Goals: To be met in 8-10 sessions  1. Pt will verbalize and demo compliance with HEP at least 75% of the time to allow for independent management of symptoms at discharge. - in progress  2. Pt will demo improved lumbar spine AROM by at least 25% to be able to bend and turn with increased ease - in progress  3. Pt will demo improved core and pelvic stability and strength to at least 4/5 for improved stability with functional mobility - in progress  4.  Pt will demo proper mechanics for bending/lifting to decrease risk for re-injury - in progress  5. Pt will be able to sleep normally in bed without waking up in pain - in progress     Date: 8/18/22  Tx #: 2 Date: 8/25/22  Tx#: 3 Date: 9/13/22  Tx#:4    Therapeutic exercises NuStep lvl 2x7iuqw UEs/LEs    Supine pelvic clocks 12<>6 20x    Supine TrA + hip add with ball 90r4tvx    Supine TrA + hip abd RTB 28o4pni    Supine SLR - stopped secondary to LBP    Supine TrA march 90y2pes    sidely thoracic rotations BLEs flexed 10x R/L NuStep lvl 8u7acah UEs/LEs    Supine LTR 10x R/L    Supine pelvic clocks 12<>6 10x R/L    Supine SKTC 10x R/L    Supine sciatic nerve glide 10x R/L    Supine hip add 35k4omy    Seated piriformis stretch 91e3ktc    Seated flexion 65cm ball 10x fwd    Shuttle (B) leg press 4B 10x    Shuttle (SL) leg press 3B 10x R/L   Nustep lvl 9l0vdyi UEs/LEs    Supine pelvic clocks 12<>6 10x    Supine LTR 15x R/L    Seated hip add with ball 82m4mii     Seated gentle self traction    L stretch at sink       Manual Therapy Prone: STM/theragun to B thoracolumbar paraspinals   Rsidely: lumbosacral distraction, lumbar segmental mobilizations, STM lumbar paraspinals   Neuromuscular Re-education        Current HEP:   8/16: supine pelvic clocks 12<>6, supine TrA + hip add, seated piriformis stretch 59z3oaw - handout issued  8/18: sidely thoracic rotation, supine hip abd RTB - handout issued  8/25: seated piriformis, seated flexion ball - handout issued  9/13: L stretch or gentle seated traction in supportive chair - handout declined    Plan: Assess effect of HEP progressions. Continue with progressive mobility and strengthening as tolerated    Charges:  TherEx x2, Man x1       Total Timed Treatment: 48 min  Total Treatment Time: 48 min

## 2022-09-14 ENCOUNTER — APPOINTMENT (OUTPATIENT)
Dept: PHYSICAL THERAPY | Age: 42
End: 2022-09-14
Attending: PHYSICAL MEDICINE & REHABILITATION
Payer: MEDICAID

## 2022-09-14 ENCOUNTER — TELEPHONE (OUTPATIENT)
Dept: FAMILY MEDICINE CLINIC | Facility: CLINIC | Age: 42
End: 2022-09-14

## 2022-09-14 NOTE — TELEPHONE ENCOUNTER
Called patient in regards to  message below, she answered when I requested name and birthday call was disconnected        Called again, patient phone is breaking up .    She asked to send message on [employer]    Urgent  message sent

## 2022-09-15 ENCOUNTER — TELEPHONE (OUTPATIENT)
Dept: NEUROLOGY | Facility: CLINIC | Age: 42
End: 2022-09-15

## 2022-09-15 ENCOUNTER — VIRTUAL PHONE E/M (OUTPATIENT)
Dept: FAMILY MEDICINE CLINIC | Facility: CLINIC | Age: 42
End: 2022-09-15
Payer: MEDICAID

## 2022-09-15 DIAGNOSIS — R93.0 ABNORMAL MRI OF HEAD: ICD-10-CM

## 2022-09-15 DIAGNOSIS — R51.9 FREQUENT HEADACHES: Primary | ICD-10-CM

## 2022-09-15 DIAGNOSIS — R20.0 FACIAL NUMBNESS: ICD-10-CM

## 2022-09-15 NOTE — TELEPHONE ENCOUNTER
Y 9/14/2022 10:20 AM Mylene Cyr, RN [address]  Appointment for Thursday     appt scheduled 9/15/22 at 8 am with Laurence Michele

## 2022-09-15 NOTE — TELEPHONE ENCOUNTER
received a call from Dr. Zoraida Curtis office  to see if we can offer a sooner apt with Dr. Audrey Stoner .   Call the apt and offer apt for 10/18/22 @ 2:40 BUT patient decline and will keep the apt in November

## 2022-09-15 NOTE — TELEPHONE ENCOUNTER
Patient had Virtual Visit with Steven Reyes Today. RN called Neurology spoke with Fabiola Kirkpatrick. Per Fabiola Kirkpatrick, Dr. Veronika Tam does not double book. Neurology is offering: 10/18/22 at 2:40pm [place], [place] with Dr. Melinda Millan    RN requested that Neurology call patient. They will call and offer this appointment.      Future Appointments   Date Time Provider [place]   9/20/2022  9:15 AM Emmy Howells, PT [facility] [place]   9/23/2022  7:45 AM Melia Booth, PT [facility] [place]   10/11/2022  8:10 AM Kristyn Davis MD [facility]   11/11/2022 11:20 AM Vijya Apple DO [facility] THE [facility]   12/21/2022  9:00 AM Patricio Gallardo MD [facility]

## 2022-09-20 ENCOUNTER — OFFICE VISIT (OUTPATIENT)
Dept: PHYSICAL THERAPY | Age: 42
End: 2022-09-20
Attending: PHYSICAL MEDICINE & REHABILITATION
Payer: MEDICAID

## 2022-09-20 PROCEDURE — 97110 THERAPEUTIC EXERCISES: CPT

## 2022-09-20 NOTE — PROGRESS NOTES
Diagnosis:  Myalgia (M79.10)  Lumbar spondylosis (M47.816)  DDD (degenerative disc disease), lumbosacral (M51.37)  Mechanical low back pain (M54.59)  Facet syndrome, lumbar (M47.816)  Lumbar trigger point syndrome (M54.59)  Lumbar foraminal stenosis (M48.061)  Numbness in both hands (R20.0)  Right foot pain (M79.671)  DDD (degenerative disc disease), cervical (M50.30)  Cervical radiculopathy (M54.12)  Weakness (R53.1)  Numbness in feet (R20.0)  Insurance (Authorized # of Visits):  [place]   (15 auth  Exp 10/15)  Authorizing Physician: Dr. Angeli Ayoub MD visit: none scheduled  Fall Risk: standard         Precautions: n/a           Cancellations: 1   No Shows: 0  Most recent Evaluation/Progress note: Visit 1 on 8/18/22  Rehab Potential:excellent  FOTO: 32/100  Modified Oswestry: 60.7    Subjective: Pt reports she had period of 6mins where the left side of her face felt paralyzed. She called her doctor and had a video visit. Was unable to move up her neurologist appt. Pain: 4/10    Assessment: Continued with gentle stretching/mobility work. Pt with greatest relief with gentle traction stretches. Objective: (See Flowsheet Below)  Trunk         Pain (+/-)   Flexion 50% limited + lower back   Extension 75% limited + pressure lower back and down legs   R Sidebend Full -   L Sidebend Full -   R Rotation 25% limited +   L Rotation 25% limited  +        Goals: To be met in 8-10 sessions  1. Pt will verbalize and demo compliance with HEP at least 75% of the time to allow for independent management of symptoms at discharge. - in progress  2. Pt will demo improved lumbar spine AROM by at least 25% to be able to bend and turn with increased ease - in progress  3. Pt will demo improved core and pelvic stability and strength to at least 4/5 for improved stability with functional mobility - in progress  4. Pt will demo proper mechanics for bending/lifting to decrease risk for re-injury - in progress  5.  Pt will be able to sleep normally in bed without waking up in pain - in progress     Date: 8/18/22  Tx #: 2 Date: 8/25/22  Tx#: 3 Date: 9/13/22  Tx#:4  Date: 9/20/22  Tx#: 5   Therapeutic exercises NuStep lvl 3p3zkrc UEs/LEs    Supine pelvic clocks 12<>6 20x    Supine TrA + hip add with ball 72f8sjn    Supine TrA + hip abd RTB 32l7srf    Supine SLR - stopped secondary to LBP    Supine TrA march 35w7mfb    sidely thoracic rotations BLEs flexed 10x R/L NuStep lvl 9g8hmbd UEs/LEs    Supine LTR 10x R/L    Supine pelvic clocks 12<>6 10x R/L    Supine SKTC 10x R/L    Supine sciatic nerve glide 10x R/L    Supine hip add 19l7ahs    Seated piriformis stretch 59x4umx    Seated flexion 65cm ball 10x fwd    Shuttle (B) leg press 4B 10x    Shuttle (SL) leg press 3B 10x R/L   Nustep lvl 1l6rerw UEs/LEs    Supine pelvic clocks 12<>6 10x    Supine LTR 15x R/L    Seated hip add with ball 54t7pxp     Seated gentle self traction    L stretch at sink     NuStep lvl 6k5yhkl UEs/LEs    Supine LTR 10x R/L    Supine SKTC with towel assist 10x R/L    Supine TrA + hip add with ball 35p7pai    Supine TrA + hip abd greenTB loop 10x    Seated piriformis stretch 10x    Stall bar L stretch 20x    Standing hip abd 15x R/L       Manual Therapy Prone: STM/theragun to B thoracolumbar paraspinals   Rsidely: lumbosacral distraction, lumbar segmental mobilizations, STM lumbar paraspinals    Neuromuscular Re-education         Current HEP:   8/16: supine pelvic clocks 12<>6, supine TrA + hip add, seated piriformis stretch 25l2qhr - handout issued  8/18: sidely thoracic rotation, supine hip abd RTB - handout issued  8/25: seated piriformis, seated flexion ball - handout issued  9/13: L stretch or gentle seated traction in supportive chair - handout declined    Plan: Re-assess and anticipate discharge    Charges:  TherEx x3     Total Timed Treatment: 40 min  Total Treatment Time: 40 min

## 2022-09-21 ENCOUNTER — TELEPHONE (OUTPATIENT)
Dept: FAMILY MEDICINE CLINIC | Facility: CLINIC | Age: 42
End: 2022-09-21

## 2022-09-22 ENCOUNTER — APPOINTMENT (OUTPATIENT)
Dept: PHYSICAL THERAPY | Age: 42
End: 2022-09-22
Attending: PHYSICAL MEDICINE & REHABILITATION
Payer: MEDICAID

## 2022-09-23 ENCOUNTER — APPOINTMENT (OUTPATIENT)
Dept: PHYSICAL THERAPY | Age: 42
End: 2022-09-23
Attending: PHYSICAL MEDICINE & REHABILITATION
Payer: MEDICAID

## 2022-09-23 NOTE — TELEPHONE ENCOUNTER
Luz Montoya,     Pt seeking long term disability for tears in L elbow tendons and for carpal tunnel. Pt states shehas been out of work since 2019. DO you support? Thank you,    Adriane Guzman.

## 2022-09-26 ENCOUNTER — LAB ENCOUNTER (OUTPATIENT)
Dept: LAB | Facility: HOSPITAL | Age: 42
End: 2022-09-26
Attending: ORTHOPAEDIC SURGERY

## 2022-09-26 DIAGNOSIS — Z01.818 PREOP TESTING: ICD-10-CM

## 2022-09-26 NOTE — TELEPHONE ENCOUNTER
Sabra Douglas,      Please sign off on form: LTD  -Highlight the patient and hit \"Chart\" button. -In Chart Review, w/in the Encounter tab - click 1 time on the Telephone call encounter for 9/21/22 Scroll down the telephone encounter.  -Click \"scan on\" blue Hyperlink under \"Media\" heading for Disab Nazario Creed 9/26/22 w/in the telephone enc.  -Click on Acknowledge button at the bottom right corner and left-click onto image, signature stamp appears and drag signature to Provider signature line. Stamp will turn blue. Close window. Thank you,    Cecily Contreras.

## 2022-09-27 LAB — SARS-COV-2 RNA RESP QL NAA+PROBE: NOT DETECTED

## 2022-09-28 RX ORDER — ACETAMINOPHEN 500 MG
1000 TABLET ORAL EVERY 6 HOURS PRN
COMMUNITY

## 2022-09-28 RX ORDER — SODIUM CHLORIDE, SODIUM LACTATE, POTASSIUM CHLORIDE, CALCIUM CHLORIDE 600; 310; 30; 20 MG/100ML; MG/100ML; MG/100ML; MG/100ML
INJECTION, SOLUTION INTRAVENOUS CONTINUOUS
Status: DISCONTINUED | OUTPATIENT
Start: 2022-09-28 | End: 2022-09-28

## 2022-09-28 RX ORDER — ACETAMINOPHEN 500 MG
1000 TABLET ORAL ONCE
Status: DISCONTINUED | OUTPATIENT
Start: 2022-09-28 | End: 2022-09-28

## 2022-09-29 ENCOUNTER — HOSPITAL ENCOUNTER (OUTPATIENT)
Facility: HOSPITAL | Age: 42
Setting detail: HOSPITAL OUTPATIENT SURGERY
Discharge: HOME | End: 2022-09-29
Attending: ORTHOPAEDIC SURGERY | Admitting: ORTHOPAEDIC SURGERY
Payer: MEDICAID

## 2022-09-29 VITALS
BODY MASS INDEX: 28.34 KG/M2 | HEIGHT: 64 IN | TEMPERATURE: 99 F | WEIGHT: 166 LBS | OXYGEN SATURATION: 100 % | RESPIRATION RATE: 18 BRPM | DIASTOLIC BLOOD PRESSURE: 82 MMHG | HEART RATE: 73 BPM | SYSTOLIC BLOOD PRESSURE: 117 MMHG

## 2022-09-29 DIAGNOSIS — Z01.818 PREOP TESTING: Primary | ICD-10-CM

## 2022-09-29 DIAGNOSIS — G56.02 CARPAL TUNNEL SYNDROME, LEFT: ICD-10-CM

## 2022-09-29 PROCEDURE — 01N50ZZ RELEASE MEDIAN NERVE, OPEN APPROACH: ICD-10-PCS | Performed by: ORTHOPAEDIC SURGERY

## 2022-09-29 RX ORDER — BUPIVACAINE HYDROCHLORIDE 5 MG/ML
INJECTION, SOLUTION EPIDURAL; INTRACAUDAL AS NEEDED
Status: DISCONTINUED | OUTPATIENT
Start: 2022-09-29 | End: 2022-09-29 | Stop reason: HOSPADM

## 2022-09-29 RX ORDER — ACETAMINOPHEN 500 MG
1000 TABLET ORAL EVERY 8 HOURS PRN
Status: CANCELLED | OUTPATIENT
Start: 2022-09-29

## 2022-09-29 RX ORDER — ONDANSETRON 2 MG/ML
4 INJECTION INTRAMUSCULAR; INTRAVENOUS EVERY 6 HOURS PRN
Status: CANCELLED | OUTPATIENT
Start: 2022-09-29

## 2022-09-29 RX ORDER — ACETAMINOPHEN AND CODEINE PHOSPHATE 300; 30 MG/1; MG/1
1 TABLET ORAL 2 TIMES DAILY PRN
Qty: 6 TABLET | Refills: 0 | Status: SHIPPED | OUTPATIENT
Start: 2022-09-29

## 2022-09-29 RX ORDER — CEPHALEXIN 500 MG/1
2000 CAPSULE ORAL ONCE
Status: COMPLETED | OUTPATIENT
Start: 2022-09-29 | End: 2022-09-29

## 2022-09-29 NOTE — INTERVAL H&P NOTE
Pre-op Diagnosis: Carpal tunnel syndrome, left [G56.02]    The above referenced H&P was reviewed by Bhargav Rivera MD on 9/29/2022, the patient was examined and no significant changes have occurred in the patient's condition since the H&P was performed. I discussed with the patient and/or legal representative the potential benefits, risks and side effects of this procedure; the likelihood of the patient achieving goals; and potential problems that might occur during recuperation. I discussed reasonable alternatives to the procedure, including risks, benefits and side effects related to the alternatives and risks related to not receiving this procedure. We will proceed with procedure as planned.

## 2022-09-29 NOTE — BRIEF OP NOTE
Pre-Operative Diagnosis: Carpal tunnel syndrome, left [G56.02]     Post-Operative Diagnosis: Carpal tunnel syndrome, left [G56.02]      Procedure Performed:   Left carpal tunnel release    Surgeon(s) and Role:     * Felix Aguilera MD - Primary    Assistant(s):  PA: Gary Eason PA-C     Anesthesia: Local     Surgical Findings: Median nerve without masses, adhesions, or lacerations. Tourniquet 10 minutes at 250 mmHg. Complications: None  Specimen: None   Drains: None  Estimated Blood Loss: <1 cc  Stable to day surgery.     Rubén Lester MD  9/29/2022  8:06 AM

## 2022-09-30 ENCOUNTER — APPOINTMENT (OUTPATIENT)
Dept: PHYSICAL THERAPY | Age: 42
End: 2022-09-30
Attending: PHYSICAL MEDICINE & REHABILITATION
Payer: MEDICAID

## 2022-09-30 NOTE — OPERATIVE REPORT
[facility]    PATIENT'S NAME: Alberto Farmer   ATTENDING PHYSICIAN: [address] MARIA ELENA Manley MD   OPERATING PHYSICIAN: Kyle Manley MD   PATIENT ACCOUNT#:   [id number]    LOCATION:  [address]  MEDICAL RECORD #:   [id number]       YOB: 1980  ADMISSION DATE:       09/29/2022      OPERATION DATE:  09/29/2022    OPERATIVE REPORT    PREOPERATIVE DIAGNOSIS:  Left carpal tunnel syndrome. POSTOPERATIVE DIAGNOSIS:  Left carpal tunnel syndrome. PROCEDURE:  Left carpal tunnel release. ASSISTANT:  Bambi Iniguez PA-C. ANESTHESIA:  Local.    INDICATIONS:  Patient is a 40-year-old woman with the above diagnosis documented by physical exam and an ultrasound. She has failed nonoperative treatment. The risks and complications of surgery were discussed and no guarantees were given. The consent was signed. OPERATIVE TECHNIQUE:  Patient took 2000 mg of cephalexin orally in the preoperative area. She was brought to the operating room and no intravenous access was necessary. Nasal cannula oxygen and blood pressure cuff with pulse oximeter were placed. Tourniquet was placed on the upper left arm. Her left arm was provisionally prepped with Betadine and the presumed incision area was injected with 10 mL of Sensorcaine 0.5% without epinephrine. Her left upper extremity was then formally prepped and draped in sterile fashion with ChloraPrep. The arm was exsanguinated and the tourniquet inflated to 250 mmHg. Tourniquet time for the case was 10 minutes. Incision was made in the base of the palm in the longitudinal flexor crease. Blunt dissection took place through the palmar fat and fascia. Self-retaining retractor was placed side-to- side, and the assistant held the [address] in the distal end of the wound. Under careful dissection, the distal edge of the transverse carpal ligament was incised with a scalpel, exposing the deep palmar fat.   A grooved sheath retractor was then placed under the transverse carpal ligament and advanced proximally. The assistant now held the retractor at the proximal end of the wound. Scalpel was used to transect the ligament more proximally. The [address] set was then brought in the field. The successive paddles were used to dissect the transverse carpal ligament under direct visualization. Then, using the Normal, under direct visualization, the proximal end of the transverse carpal ligament was then incised. The median nerve was inspected, and there were no masses, adhesions, or lacerations. The wound was irrigated and closed with 4-0 nylon suture in horizontal interrupted mattress sutures. Xeroform was placed followed by sterile gauze, sterile Webril, and Ace wrap. Tourniquet time for the case was 10 minutes. The patient held pressure on the wound for 15 minutes by the clock, off 5 minutes, and then repeat for the next hour and a half. Postop instructions were given in both written and oral form, and she will follow up Dr. Cipriano Watson in 2 weeks' time in the office for suture removal.  Tylenol with codeine and Tylenol were given for pain relief. Patient cannot take anti-inflammatories due to allergy. Blood loss was less than 1 mL. No specimens were sent. No drains were used. No complications were noted. Patient tolerated the procedure well. Dictated By Stanislav Tay.  Cipriano Watson MD  d: 09/29/2022 10:42:29  t: 09/29/2022 23:22:07  Job [id number]  [facility]/    cc: Alexandra Carrington DO

## 2022-10-11 ENCOUNTER — OFFICE VISIT (OUTPATIENT)
Dept: ORTHOPEDICS CLINIC | Facility: CLINIC | Age: 42
End: 2022-10-11
Payer: MEDICAID

## 2022-10-11 DIAGNOSIS — M77.02 MEDIAL EPICONDYLITIS OF ELBOW, LEFT: ICD-10-CM

## 2022-10-11 DIAGNOSIS — G56.22 CUBITAL TUNNEL SYNDROME ON LEFT: ICD-10-CM

## 2022-10-11 DIAGNOSIS — G56.02 CARPAL TUNNEL SYNDROME, LEFT: Primary | ICD-10-CM

## 2022-10-11 PROCEDURE — 99024 POSTOP FOLLOW-UP VISIT: CPT | Performed by: ORTHOPAEDIC SURGERY

## 2022-10-20 NOTE — TELEPHONE ENCOUNTER
[address] \[facility]\"", MD, FACP, [place], [place]      TYLER Stacy, [facility]-BC   Regina Villavicencio, [facility]   Sugar Hubbard FNP-GABBI Cuadra FNP-C    Jaynee Castleman, [facility]       Marti [facility] Re[facility] by [facility] 136    at [address], [address] [place] [address] 22.    [phone]    FAX: [address] [place] [address], [address] [facility] [address]    [phone]    FAX: [phone]         LIVER BIOPSY PROCEDURE NOTE    Randal Soliman  1956    INDICATIONS/PRE-OPERATIVE  DIAGNOSIS:  Elevated liver enzymes    : Cindy Soto MD    SURGICAL ASSISTANT:  None    PROSTHETIC DEVICES, TISSUE GRAFTS, TRANSPLANTED ORGANS:  Not applicable    SEDATION: 1% Lidocaine injection 10 ml    PROCEDURE:  Informed consent to perform the procedure was obtained from the patient. The patient was positioned on the edge of the stretcher lying flat in the supine position. Ultrasound was utilized to image the liver. The diaphragm and any major mass lesion or vascular structures within the liver were identified. An appropriate site for liver biopsy was identified. The distance from the surface of the skin to the liver capsule was 3 cm. This area was prepped with betadine and draped in sterile fashion. The skin was infiltrated with 1% lidocaine. The deeper subcutanous tissues and liver capsule overlying the biopsy site were then infiltrated with 1% lidocaine until appropriate anesthesia was obtained. A small incision was made in the skin so the biopsy devise could be easily inserted. A total of 2 passes with the 16 gauge Bard biopsy devise was then made into the liver.   Core(s) of liver tissue totaling 4 cm in length were obtained and placed into LMTCB tissue fixative. A band aid was placed over the biopsy site. The patient was then repositioned on the right side and transported to the recovery area on the stretcher for routine monitoring until discharge. The specimen was sent to pathology for processing via the normal transport mechanism. SPECIMEN COLLECTED: Liver    INTERVENTIONS:  None    ESTIMATED BLOOD LOSS: Negligible.      COMPLICATIONS:  None    POST-OPERATIVE DIAGNOSIS: Same as Pre-operative Diagnosis      Wilberto Abdi MD  [address] [place] [address].  Jad Moon 7  [address] [facility] 22.  [phone]  FAX:  [address]

## 2022-11-10 ENCOUNTER — TELEPHONE (OUTPATIENT)
Dept: ORTHOPEDICS CLINIC | Facility: CLINIC | Age: 42
End: 2022-11-10

## 2022-11-10 ENCOUNTER — APPOINTMENT (OUTPATIENT)
Dept: GENERAL RADIOLOGY | Age: 42
End: 2022-11-10
Attending: NURSE PRACTITIONER
Payer: MEDICAID

## 2022-11-10 ENCOUNTER — HOSPITAL ENCOUNTER (OUTPATIENT)
Age: 42
Discharge: HOME | End: 2022-11-10
Payer: MEDICAID

## 2022-11-10 VITALS
HEART RATE: 94 BPM | TEMPERATURE: 98 F | RESPIRATION RATE: 16 BRPM | DIASTOLIC BLOOD PRESSURE: 90 MMHG | OXYGEN SATURATION: 98 % | SYSTOLIC BLOOD PRESSURE: 137 MMHG

## 2022-11-10 DIAGNOSIS — M25.521 RIGHT ELBOW PAIN: Primary | ICD-10-CM

## 2022-11-10 PROCEDURE — 99214 OFFICE O/P EST MOD 30 MIN: CPT

## 2022-11-10 PROCEDURE — 73080 X-RAY EXAM OF ELBOW: CPT | Performed by: NURSE PRACTITIONER

## 2022-11-10 PROCEDURE — 73110 X-RAY EXAM OF WRIST: CPT | Performed by: NURSE PRACTITIONER

## 2022-11-10 RX ORDER — ACETAMINOPHEN AND CODEINE PHOSPHATE 300; 30 MG/1; MG/1
1-2 TABLET ORAL EVERY 6 HOURS PRN
Qty: 10 TABLET | Refills: 0 | Status: SHIPPED | OUTPATIENT
Start: 2022-11-10 | End: 2022-11-10 | Stop reason: CLARIF

## 2022-11-10 NOTE — TELEPHONE ENCOUNTER
Spoke to patient and relayed Dr. Stephanie Devi message as shown below. Patient verbalized understanding and had no further questions at this time.

## 2022-11-10 NOTE — TELEPHONE ENCOUNTER
Patient is s/p Left CTR. had sx on 9/29/22. Missed post op visit on 11/7/22. Next available appointment not until 12/6/22. Please advise if you would like patient overbooked sooner than next available. Thank you.

## 2022-11-10 NOTE — ED INITIAL ASSESSMENT (HPI)
Pt presents with pain from right elbow to right wrist. Pt reports hx of \"tennis elbow\" surgery on 2/28/22, No trauma or new injury. Tylenol PO taken at 7a today.

## 2022-11-10 NOTE — TELEPHONE ENCOUNTER
Per pt missed her appointment 11/7 for post op and asking to reschedule as soon as possible. No appointments until December.  Please advise

## 2022-11-11 ENCOUNTER — VIRTUAL PHONE E/M (OUTPATIENT)
Dept: FAMILY MEDICINE CLINIC | Facility: CLINIC | Age: 42
End: 2022-11-11

## 2022-11-11 ENCOUNTER — LAB ENCOUNTER (OUTPATIENT)
Dept: LAB | Facility: HOSPITAL | Age: 42
End: 2022-11-11
Attending: NURSE PRACTITIONER
Payer: MEDICAID

## 2022-11-11 ENCOUNTER — OFFICE VISIT (OUTPATIENT)
Dept: NEUROLOGY | Facility: CLINIC | Age: 42
End: 2022-11-11
Payer: MEDICAID

## 2022-11-11 VITALS — BODY MASS INDEX: 28.35 KG/M2 | WEIGHT: 160 LBS | HEIGHT: 63 IN

## 2022-11-11 DIAGNOSIS — M25.531 RIGHT WRIST PAIN: ICD-10-CM

## 2022-11-11 DIAGNOSIS — G43.711 INTRACTABLE CHRONIC MIGRAINE WITHOUT AURA AND WITH STATUS MIGRAINOSUS: Primary | ICD-10-CM

## 2022-11-11 DIAGNOSIS — G44.40 MEDICATION OVERUSE HEADACHE: ICD-10-CM

## 2022-11-11 DIAGNOSIS — R20.2 NUMBNESS AND TINGLING IN RIGHT HAND: ICD-10-CM

## 2022-11-11 DIAGNOSIS — M25.521 RIGHT ELBOW PAIN: Primary | ICD-10-CM

## 2022-11-11 DIAGNOSIS — Z01.818 PRE-OP EXAMINATION: ICD-10-CM

## 2022-11-11 DIAGNOSIS — M77.11 LATERAL EPICONDYLITIS OF RIGHT ELBOW: ICD-10-CM

## 2022-11-11 DIAGNOSIS — G56.11 MEDIAN MONONEUROPATHY, RIGHT: ICD-10-CM

## 2022-11-11 DIAGNOSIS — R20.0 NUMBNESS AND TINGLING IN RIGHT HAND: ICD-10-CM

## 2022-11-11 LAB
ALBUMIN SERPL-MCNC: 4 G/DL (ref 3.4–5)
ALBUMIN/GLOB SERPL: 1 {RATIO} (ref 1–2)
ALP LIVER SERPL-CCNC: 129 U/L
ALT SERPL-CCNC: 43 U/L
ANION GAP SERPL CALC-SCNC: 9 MMOL/L (ref 0–18)
APTT PPP: 29.8 SECONDS (ref 23.3–35.6)
AST SERPL-CCNC: 20 U/L (ref 15–37)
BILIRUB SERPL-MCNC: 0.5 MG/DL (ref 0.1–2)
BUN BLD-MCNC: 12 MG/DL (ref 7–18)
BUN/CREAT SERPL: 17.1 (ref 10–20)
CALCIUM BLD-MCNC: 9.3 MG/DL (ref 8.5–10.1)
CHLORIDE SERPL-SCNC: 105 MMOL/L (ref 98–112)
CO2 SERPL-SCNC: 24 MMOL/L (ref 21–32)
CREAT BLD-MCNC: 0.7 MG/DL
DEPRECATED RDW RBC AUTO: 39.8 FL (ref 35.1–46.3)
ERYTHROCYTE [DISTWIDTH] IN BLOOD BY AUTOMATED COUNT: 12.9 % (ref 11–15)
FASTING STATUS PATIENT QL REPORTED: YES
GFR SERPLBLD BASED ON 1.73 SQ M-ARVRAT: 109 ML/MIN/1.73M2 (ref 60–?)
GLOBULIN PLAS-MCNC: 4 G/DL (ref 2.8–4.4)
GLUCOSE BLD-MCNC: 93 MG/DL (ref 70–99)
HCT VFR BLD AUTO: 38.3 %
HGB BLD-MCNC: 12.8 G/DL
INR BLD: 1 (ref 0.85–1.16)
MCH RBC QN AUTO: 28.4 PG (ref 26–34)
MCHC RBC AUTO-ENTMCNC: 33.4 G/DL (ref 31–37)
MCV RBC AUTO: 85.1 FL
OSMOLALITY SERPL CALC.SUM OF ELEC: 285 MOSM/KG (ref 275–295)
PLATELET # BLD AUTO: 334 10(3)UL (ref 150–450)
POTASSIUM SERPL-SCNC: 3.6 MMOL/L (ref 3.5–5.1)
PROT SERPL-MCNC: 8 G/DL (ref 6.4–8.2)
PROTHROMBIN TIME: 13.1 SECONDS (ref 11.6–14.8)
RBC # BLD AUTO: 4.5 X10(6)UL
SODIUM SERPL-SCNC: 138 MMOL/L (ref 136–145)
WBC # BLD AUTO: 8.5 X10(3) UL (ref 4–11)

## 2022-11-11 PROCEDURE — 36415 COLL VENOUS BLD VENIPUNCTURE: CPT

## 2022-11-11 PROCEDURE — 85730 THROMBOPLASTIN TIME PARTIAL: CPT

## 2022-11-11 PROCEDURE — 85610 PROTHROMBIN TIME: CPT

## 2022-11-11 PROCEDURE — 80053 COMPREHEN METABOLIC PANEL: CPT

## 2022-11-11 PROCEDURE — 85027 COMPLETE CBC AUTOMATED: CPT

## 2022-11-11 RX ORDER — DULOXETIN HYDROCHLORIDE 30 MG/1
30 CAPSULE, DELAYED RELEASE ORAL DAILY
Qty: 90 CAPSULE | Refills: 1 | Status: SHIPPED | OUTPATIENT
Start: 2022-11-11 | End: 2023-05-10

## 2022-11-11 RX ORDER — SUMATRIPTAN 100 MG/1
TABLET, FILM COATED ORAL
Qty: 9 TABLET | Refills: 11 | Status: SHIPPED | OUTPATIENT
Start: 2022-11-11

## 2022-11-11 RX ORDER — PREDNISONE 20 MG/1
TABLET ORAL
Qty: 24 TABLET | Refills: 0 | Status: SHIPPED | OUTPATIENT
Start: 2022-11-11 | End: 2022-11-23

## 2022-11-11 NOTE — ASSESSMENT & PLAN NOTE
Right wrist MRI order placed-preauthorization may be required for patient's insurance and patient notified  Patient has follow-up appointment with orthopedics  Patient has long history of bilateral wrist and elbow surgeries and pain

## 2022-11-11 NOTE — ASSESSMENT & PLAN NOTE
MRI right elbow order placed-patient advised that there may be precertification there is needed by insurance company. Has appointment for follow-up with orthopedic doctor.   Patient has history of bilat carpal tunnel and elbow surgeries

## 2022-11-22 ENCOUNTER — OFFICE VISIT (OUTPATIENT)
Dept: ORTHOPEDICS CLINIC | Facility: CLINIC | Age: 42
End: 2022-11-22
Payer: MEDICAID

## 2022-11-22 DIAGNOSIS — M77.02 MEDIAL EPICONDYLITIS OF ELBOW, LEFT: ICD-10-CM

## 2022-11-22 DIAGNOSIS — G56.22 CUBITAL TUNNEL SYNDROME ON LEFT: Primary | ICD-10-CM

## 2022-11-22 PROCEDURE — 99024 POSTOP FOLLOW-UP VISIT: CPT | Performed by: ORTHOPAEDIC SURGERY

## 2022-11-25 ENCOUNTER — TELEPHONE (OUTPATIENT)
Dept: FAMILY MEDICINE CLINIC | Facility: CLINIC | Age: 42
End: 2022-11-25

## 2022-11-25 ENCOUNTER — TELEMEDICINE (OUTPATIENT)
Dept: FAMILY MEDICINE CLINIC | Facility: CLINIC | Age: 42
End: 2022-11-25
Payer: MEDICAID

## 2022-11-25 DIAGNOSIS — M79.602 LEFT ARM PAIN: Primary | ICD-10-CM

## 2022-11-25 DIAGNOSIS — G56.22 CUBITAL TUNNEL SYNDROME ON LEFT: ICD-10-CM

## 2022-11-25 PROCEDURE — 99213 OFFICE O/P EST LOW 20 MIN: CPT | Performed by: FAMILY MEDICINE

## 2022-11-25 NOTE — TELEPHONE ENCOUNTER
Called patient, confirmed name and . She is willing to go anywhere to get the [address] done as soon as possible. Called Ultrasound and [employer] was able to change Tamanna's order to stat. [employer] also explains that this is type of ultrasound requires a tech trained to do the exam and [facility] it is only done on . Called patient, confirmed name and . Informed of above. Patient verbalized understanding and agrees to call scheduling. Also provided her with information for [facility] and faxed order to them if needed.

## 2022-12-06 ENCOUNTER — OFFICE VISIT (OUTPATIENT)
Dept: ORTHOPEDICS CLINIC | Facility: CLINIC | Age: 42
End: 2022-12-06
Payer: MEDICAID

## 2022-12-06 ENCOUNTER — PATIENT MESSAGE (OUTPATIENT)
Dept: ORTHOPEDICS CLINIC | Facility: CLINIC | Age: 42
End: 2022-12-06

## 2022-12-06 DIAGNOSIS — M25.522 LEFT ELBOW PAIN: ICD-10-CM

## 2022-12-06 DIAGNOSIS — G56.22 CUBITAL TUNNEL SYNDROME ON LEFT: Primary | ICD-10-CM

## 2022-12-06 PROCEDURE — 99024 POSTOP FOLLOW-UP VISIT: CPT | Performed by: ORTHOPAEDIC SURGERY

## 2022-12-06 NOTE — TELEPHONE ENCOUNTER
From: Brittany José  To: Gwen Choudhury MD  Sent: 12/6/2022 10:26 AM CST  Subject: Today's visit Dec, 06 2022    Please know that I was marked down as \"male\" on the pre-op form, I'm a Female. Brown Santiago Can you please correct that?

## 2022-12-08 ENCOUNTER — TELEPHONE (OUTPATIENT)
Dept: ORTHOPEDICS CLINIC | Facility: CLINIC | Age: 42
End: 2022-12-08

## 2022-12-08 NOTE — TELEPHONE ENCOUNTER
LMTCB. We have a surgery date available for patient. 1/12/2023 is the date. Patient needs medical clearance, so anytime after 12/12/22. Informed patient that this date is only good for 48hrs, then we will have to re send an email for new surgery dates.

## 2022-12-13 ENCOUNTER — TELEPHONE (OUTPATIENT)
Dept: CASE MANAGEMENT | Age: 42
End: 2022-12-13

## 2022-12-13 NOTE — TELEPHONE ENCOUNTER
The health plan has denied request for MRI Right Wrist and Right Elbow. Listed below is the denial rationale. You may reach out to the health plan to discuss decision further at [phone], reference case #'s [id number] and [id number]. Also, please reach out to the patient with plan of care.      Thank you,   Kem

## 2022-12-16 ENCOUNTER — PATIENT MESSAGE (OUTPATIENT)
Dept: ORTHOPEDICS CLINIC | Facility: CLINIC | Age: 42
End: 2022-12-16

## 2022-12-19 NOTE — TELEPHONE ENCOUNTER
From: Odin Pino  Sent: 12/16/2022 5:45 PM CST  To: [facility] Clinical Staff  Subject: Today's visit Dec, 06 2022    Thanks so much!  on a different issue, I had trouble with my phone for a couple of days, can you please help me check if the surgery scheduler will call me to set up the date and time of surgery for my left elbow?
Message routed to ortho sugical
Sent a [employer] message. Requested new surgery dates available- as I tried to contact her on 12/8/22 - surgery dates are good for 48 hours.
Cardiology

## 2022-12-21 ENCOUNTER — TELEPHONE (OUTPATIENT)
Dept: GASTROENTEROLOGY | Facility: CLINIC | Age: 42
End: 2022-12-21

## 2022-12-21 ENCOUNTER — OFFICE VISIT (OUTPATIENT)
Dept: GASTROENTEROLOGY | Facility: CLINIC | Age: 42
End: 2022-12-21
Payer: MEDICAID

## 2022-12-21 VITALS
HEIGHT: 63 IN | DIASTOLIC BLOOD PRESSURE: 73 MMHG | BODY MASS INDEX: 30.3 KG/M2 | SYSTOLIC BLOOD PRESSURE: 120 MMHG | WEIGHT: 171 LBS | HEART RATE: 94 BPM

## 2022-12-21 DIAGNOSIS — R10.12 LUQ ABDOMINAL PAIN: Primary | ICD-10-CM

## 2022-12-21 DIAGNOSIS — R11.0 NAUSEA: ICD-10-CM

## 2022-12-21 DIAGNOSIS — K59.00 CONSTIPATION, UNSPECIFIED CONSTIPATION TYPE: Primary | ICD-10-CM

## 2022-12-21 DIAGNOSIS — K59.00 CONSTIPATION, UNSPECIFIED CONSTIPATION TYPE: ICD-10-CM

## 2022-12-21 DIAGNOSIS — R10.12 LEFT UPPER QUADRANT ABDOMINAL PAIN: ICD-10-CM

## 2022-12-21 PROCEDURE — 99203 OFFICE O/P NEW LOW 30 MIN: CPT | Performed by: INTERNAL MEDICINE

## 2022-12-21 PROCEDURE — 3008F BODY MASS INDEX DOCD: CPT | Performed by: INTERNAL MEDICINE

## 2022-12-21 PROCEDURE — 3078F DIAST BP <80 MM HG: CPT | Performed by: INTERNAL MEDICINE

## 2022-12-21 PROCEDURE — 3074F SYST BP LT 130 MM HG: CPT | Performed by: INTERNAL MEDICINE

## 2022-12-21 NOTE — PATIENT INSTRUCTIONS
Constipation bloating  Abdominal pain  Nausea  Colon cancer screening  - colonoscopy with Golytely and MAC  - EGD with the colonoscopy  - high fiber diet   - plenty of fluids  - Miralax daily  - ok to use laxative if no BM in 2- 3 days

## 2022-12-21 NOTE — TELEPHONE ENCOUNTER
Scheduled for:  Colonoscopy [phone] ,[address]   Provider Name:    Date:  3/6/23  Location:   [facility]  Sedation:  Mac  Time : 12:30 Pm (pt is aware to arrive at 11:30 Am )   Prep:Golytely and Egd   Prep instructions were given to pt in the office, pt verbalized understanding. Meds/Allergies Reconciled?:  Physician reviewed     Diagnosis with codes:  Constipation K59.00 , LUQ Abdominal pain R10.12, Nausea R11.0  Was patient informed to call insurance with codes (Y/N):  Yes, I confirmed Medicaid replacement  insurance with the patient. The patient also verbally understands to call her insurance to check for pre-cert, codes were given on prep instructions. Referral sent?:  Yes  [facility] or [address] notified?:  I sent an electronic request to Endo Scheduling and received a confirmation today. Medication Orders: This patient verbally confirmed that she is not taking:   Iron, blood thinners, BP meds, and is not diabetic   Not latex allergy, Not PCN allergy and does not have a pacemaker Pt is aware to NOT take iron pills, herbal meds and diet supplements for 7 days before exam. Also to NOT take any form of alcohol, recreational drugs and any forms of ED meds 24 hours before exam.    Misc Orders:  Patient was informed that they will need a COVID 19 test prior to their procedure. Patient verbally understood & will await a phone call from [facility] to schedule.       Further instructions given by staff:

## 2023-01-06 ENCOUNTER — TELEPHONE (OUTPATIENT)
Dept: ORTHOPEDICS CLINIC | Facility: CLINIC | Age: 43
End: 2023-01-06

## 2023-01-06 DIAGNOSIS — G56.22 CUBITAL TUNNEL SYNDROME ON LEFT: Primary | ICD-10-CM

## 2023-01-06 NOTE — TELEPHONE ENCOUNTER
Type of surgery:  Left elbow ulnar nerve transpition  Date:  2/9/23  Location: [facility]  Medical Clearance:      *Medical: Yes      *Dental: No      *Other:  Prior Authorization Status: Pending  Workers Comp:  Medacta/Roshan:  [place]: Yes  POV: 2/28/23

## 2023-02-01 ENCOUNTER — OFFICE VISIT (OUTPATIENT)
Dept: FAMILY MEDICINE CLINIC | Facility: CLINIC | Age: 43
End: 2023-02-01

## 2023-02-01 VITALS
HEIGHT: 63 IN | SYSTOLIC BLOOD PRESSURE: 106 MMHG | WEIGHT: 164 LBS | HEART RATE: 88 BPM | DIASTOLIC BLOOD PRESSURE: 64 MMHG | BODY MASS INDEX: 29.06 KG/M2

## 2023-02-01 DIAGNOSIS — M77.02 MEDIAL EPICONDYLITIS OF ELBOW, LEFT: ICD-10-CM

## 2023-02-01 DIAGNOSIS — M54.42 ACUTE LEFT-SIDED LOW BACK PAIN WITH LEFT-SIDED SCIATICA: ICD-10-CM

## 2023-02-01 DIAGNOSIS — R93.0 ABNORMAL MRI OF HEAD: ICD-10-CM

## 2023-02-01 DIAGNOSIS — H53.2 DOUBLE VISION: Primary | ICD-10-CM

## 2023-02-01 DIAGNOSIS — G43.711 INTRACTABLE CHRONIC MIGRAINE WITHOUT AURA AND WITH STATUS MIGRAINOSUS: ICD-10-CM

## 2023-02-01 DIAGNOSIS — R51.9 FREQUENT HEADACHES: ICD-10-CM

## 2023-02-01 DIAGNOSIS — Z01.818 PRE-OPERATIVE EXAMINATION: ICD-10-CM

## 2023-02-01 PROCEDURE — 99214 OFFICE O/P EST MOD 30 MIN: CPT | Performed by: NURSE PRACTITIONER

## 2023-02-01 PROCEDURE — 3074F SYST BP LT 130 MM HG: CPT | Performed by: NURSE PRACTITIONER

## 2023-02-01 PROCEDURE — 3078F DIAST BP <80 MM HG: CPT | Performed by: NURSE PRACTITIONER

## 2023-02-01 PROCEDURE — 3008F BODY MASS INDEX DOCD: CPT | Performed by: NURSE PRACTITIONER

## 2023-02-01 RX ORDER — SUMATRIPTAN 100 MG/1
TABLET, FILM COATED ORAL
Qty: 9 TABLET | Refills: 3 | Status: SHIPPED | OUTPATIENT
Start: 2023-02-01

## 2023-02-03 ENCOUNTER — LAB ENCOUNTER (OUTPATIENT)
Dept: LAB | Age: 43
End: 2023-02-03
Attending: NURSE PRACTITIONER
Payer: MEDICAID

## 2023-02-03 ENCOUNTER — EKG ENCOUNTER (OUTPATIENT)
Dept: LAB | Age: 43
End: 2023-02-03
Attending: NURSE PRACTITIONER
Payer: MEDICAID

## 2023-02-03 ENCOUNTER — APPOINTMENT (OUTPATIENT)
Dept: LAB | Age: 43
End: 2023-02-03
Attending: NURSE PRACTITIONER
Payer: MEDICAID

## 2023-02-03 DIAGNOSIS — Z01.818 PRE-OPERATIVE EXAMINATION: ICD-10-CM

## 2023-02-03 LAB
ALBUMIN SERPL-MCNC: 4 G/DL (ref 3.4–5)
ALBUMIN/GLOB SERPL: 1.1 {RATIO} (ref 1–2)
ALP LIVER SERPL-CCNC: 120 U/L
ALT SERPL-CCNC: 54 U/L
ANION GAP SERPL CALC-SCNC: 6 MMOL/L (ref 0–18)
APTT PPP: 28.8 SECONDS (ref 23.3–35.6)
AST SERPL-CCNC: 25 U/L (ref 15–37)
ATRIAL RATE: 70 BPM
BASOPHILS # BLD AUTO: 0.04 X10(3) UL (ref 0–0.2)
BASOPHILS NFR BLD AUTO: 0.5 %
BILIRUB SERPL-MCNC: 0.6 MG/DL (ref 0.1–2)
BUN BLD-MCNC: 11 MG/DL (ref 7–18)
BUN/CREAT SERPL: 14.5 (ref 10–20)
CALCIUM BLD-MCNC: 9.1 MG/DL (ref 8.5–10.1)
CHLORIDE SERPL-SCNC: 107 MMOL/L (ref 98–112)
CO2 SERPL-SCNC: 27 MMOL/L (ref 21–32)
CREAT BLD-MCNC: 0.76 MG/DL
DEPRECATED RDW RBC AUTO: 43.6 FL (ref 35.1–46.3)
EOSINOPHIL # BLD AUTO: 0.13 X10(3) UL (ref 0–0.7)
EOSINOPHIL NFR BLD AUTO: 1.7 %
ERYTHROCYTE [DISTWIDTH] IN BLOOD BY AUTOMATED COUNT: 14 % (ref 11–15)
FASTING STATUS PATIENT QL REPORTED: YES
GFR SERPLBLD BASED ON 1.73 SQ M-ARVRAT: 99 ML/MIN/1.73M2 (ref 60–?)
GLOBULIN PLAS-MCNC: 3.8 G/DL (ref 2.8–4.4)
GLUCOSE BLD-MCNC: 87 MG/DL (ref 70–99)
HCT VFR BLD AUTO: 38.7 %
HGB BLD-MCNC: 13 G/DL
IMM GRANULOCYTES # BLD AUTO: 0.02 X10(3) UL (ref 0–1)
IMM GRANULOCYTES NFR BLD: 0.3 %
INR BLD: 1 (ref 0.85–1.16)
LYMPHOCYTES # BLD AUTO: 3.2 X10(3) UL (ref 1–4)
LYMPHOCYTES NFR BLD AUTO: 42.1 %
MCH RBC QN AUTO: 28.6 PG (ref 26–34)
MCHC RBC AUTO-ENTMCNC: 33.6 G/DL (ref 31–37)
MCV RBC AUTO: 85.1 FL
MONOCYTES # BLD AUTO: 0.46 X10(3) UL (ref 0.1–1)
MONOCYTES NFR BLD AUTO: 6.1 %
NEUTROPHILS # BLD AUTO: 3.75 X10 (3) UL (ref 1.5–7.7)
NEUTROPHILS # BLD AUTO: 3.75 X10(3) UL (ref 1.5–7.7)
NEUTROPHILS NFR BLD AUTO: 49.3 %
OSMOLALITY SERPL CALC.SUM OF ELEC: 289 MOSM/KG (ref 275–295)
P AXIS: 33 DEGREES
P-R INTERVAL: 112 MS
PLATELET # BLD AUTO: 330 10(3)UL (ref 150–450)
POTASSIUM SERPL-SCNC: 4.1 MMOL/L (ref 3.5–5.1)
PROT SERPL-MCNC: 7.8 G/DL (ref 6.4–8.2)
PROTHROMBIN TIME: 13.1 SECONDS (ref 11.6–14.8)
Q-T INTERVAL: 402 MS
QRS DURATION: 88 MS
QTC CALCULATION (BEZET): 434 MS
R AXIS: 43 DEGREES
RBC # BLD AUTO: 4.55 X10(6)UL
SODIUM SERPL-SCNC: 140 MMOL/L (ref 136–145)
T AXIS: 30 DEGREES
VENTRICULAR RATE: 70 BPM
WBC # BLD AUTO: 7.6 X10(3) UL (ref 4–11)

## 2023-02-03 PROCEDURE — 85730 THROMBOPLASTIN TIME PARTIAL: CPT

## 2023-02-03 PROCEDURE — 93005 ELECTROCARDIOGRAM TRACING: CPT

## 2023-02-03 PROCEDURE — 85025 COMPLETE CBC W/AUTO DIFF WBC: CPT

## 2023-02-03 PROCEDURE — 93010 ELECTROCARDIOGRAM REPORT: CPT | Performed by: INTERNAL MEDICINE

## 2023-02-03 PROCEDURE — 85610 PROTHROMBIN TIME: CPT

## 2023-02-03 PROCEDURE — 36415 COLL VENOUS BLD VENIPUNCTURE: CPT

## 2023-02-03 PROCEDURE — 80053 COMPREHEN METABOLIC PANEL: CPT

## 2023-02-03 NOTE — ASSESSMENT & PLAN NOTE
Preop physical completed for left ulnar nerve transfer position by Dr. Dereck Crowell on 2-9-2023  Preop labs and EKG ordered

## 2023-02-06 ENCOUNTER — LAB ENCOUNTER (OUTPATIENT)
Dept: LAB | Facility: HOSPITAL | Age: 43
End: 2023-02-06
Attending: ORTHOPAEDIC SURGERY
Payer: MEDICAID

## 2023-02-06 DIAGNOSIS — Z01.818 PREOP TESTING: ICD-10-CM

## 2023-02-07 LAB — SARS-COV-2 RNA RESP QL NAA+PROBE: NOT DETECTED

## 2023-02-08 NOTE — DISCHARGE INSTRUCTIONS
HOME INSTRUCTIONS  Keep splint on and dry for 1 week. May work on gentle range of motion in splint and sling. Wiggle fingers often. Close and open fist.  In 1 week you may remove the splint. Keep arm in sling. Keep Steri-Strip dressing in place. Cover with gauze and Ace wrap lightly. Do not wrap too tight. Work on gentle motion but do not fully extend the elbow. Call Dr. Glynn Kaiser office to make a 2-week follow-up appointment for wound recheck. Take medications as prescribed on discharge paperwork. AMBSURG HOME CARE INSTRUCTIONS: POST-OP ANESTHESIA  The medication that you received for sedation or general anesthesia can last up to 24 hours. Your judgment and reflexes may be altered, even if you feel like your normal self. We Recommend:   Do not drive any motor vehicle or bicycle   Avoid mowing the lawn, playing sports, or working with power tools/applicances (power saws, electric knives or mixers)   That you have someone stay with you on your first night home   Do not drink alcohol or take sleeping pills or tranquilizers   Do not sign legal documents within 24 hours of your procedure   If you had a nerve block for your surgery, take extra care not to put any pressure on your arm or hand for 24 hours    It is normal:  For you to have a sore throat if you had a breathing tube during surgery (while you were asleep!). The sore throat should get better within 48 hours. You can gargle with warm salt water (1/2 tsp in 4 oz warm water) or use a throat lozenge for comfort  To feel muscle aches or soreness especially in the abdomen, chest or neck. The achy feeling should go away in the next 24 hours  To feel weak, sleepy or \"wiped out\". Your should start feeling better in the next 24 hours. To experience mild discomforts such as sore lip or tongue, headache, cramps, gas pains or a bloated feeling in your abdomen.    To experience mild back pain or soreness for a day or two if you had spinal or epidural anesthesia. If you had laparoscopic surgery, to feel shoulder pain or discomfort on the day of surgery. For some patients to have nausea after surgery/anesthesia    If you feel nausea or experience vomiting:   Try to move around less.    Eat less than usual or drink only liquids until the next morning   Nausea should resolve in about 24 hours    If you have a problem when you are at home:    Call your surgeons office

## 2023-02-09 ENCOUNTER — HOSPITAL ENCOUNTER (OUTPATIENT)
Facility: HOSPITAL | Age: 43
Setting detail: HOSPITAL OUTPATIENT SURGERY
Discharge: HOME | End: 2023-02-09
Attending: ORTHOPAEDIC SURGERY | Admitting: ORTHOPAEDIC SURGERY
Payer: MEDICAID

## 2023-02-09 ENCOUNTER — ANESTHESIA (OUTPATIENT)
Dept: SURGERY | Facility: HOSPITAL | Age: 43
End: 2023-02-09
Payer: MEDICAID

## 2023-02-09 ENCOUNTER — ANESTHESIA EVENT (OUTPATIENT)
Dept: SURGERY | Facility: HOSPITAL | Age: 43
End: 2023-02-09
Payer: MEDICAID

## 2023-02-09 VITALS
OXYGEN SATURATION: 98 % | WEIGHT: 165 LBS | BODY MASS INDEX: 28.17 KG/M2 | HEART RATE: 87 BPM | HEIGHT: 64 IN | SYSTOLIC BLOOD PRESSURE: 121 MMHG | DIASTOLIC BLOOD PRESSURE: 81 MMHG | TEMPERATURE: 98 F | RESPIRATION RATE: 16 BRPM

## 2023-02-09 DIAGNOSIS — Z01.818 PREOP TESTING: Primary | ICD-10-CM

## 2023-02-09 PROBLEM — G56.22 CUBITAL TUNNEL SYNDROME, LEFT: Status: ACTIVE | Noted: 2023-02-09

## 2023-02-09 LAB — B-HCG UR QL: NEGATIVE

## 2023-02-09 PROCEDURE — 81025 URINE PREGNANCY TEST: CPT

## 2023-02-09 PROCEDURE — 01N40ZZ RELEASE ULNAR NERVE, OPEN APPROACH: ICD-10-PCS | Performed by: ORTHOPAEDIC SURGERY

## 2023-02-09 RX ORDER — LIDOCAINE HYDROCHLORIDE 10 MG/ML
INJECTION, SOLUTION EPIDURAL; INFILTRATION; INTRACAUDAL; PERINEURAL AS NEEDED
Status: DISCONTINUED | OUTPATIENT
Start: 2023-02-09 | End: 2023-02-09 | Stop reason: SURG

## 2023-02-09 RX ORDER — EPHEDRINE SULFATE 50 MG/ML
INJECTION INTRAVENOUS AS NEEDED
Status: DISCONTINUED | OUTPATIENT
Start: 2023-02-09 | End: 2023-02-09 | Stop reason: SURG

## 2023-02-09 RX ORDER — HYDROMORPHONE HYDROCHLORIDE 1 MG/ML
0.2 INJECTION, SOLUTION INTRAMUSCULAR; INTRAVENOUS; SUBCUTANEOUS EVERY 5 MIN PRN
Status: DISCONTINUED | OUTPATIENT
Start: 2023-02-09 | End: 2023-02-09

## 2023-02-09 RX ORDER — ONDANSETRON 2 MG/ML
4 INJECTION INTRAMUSCULAR; INTRAVENOUS EVERY 6 HOURS PRN
Status: DISCONTINUED | OUTPATIENT
Start: 2023-02-09 | End: 2023-02-09

## 2023-02-09 RX ORDER — ACETAMINOPHEN 500 MG
1000 TABLET ORAL EVERY 8 HOURS PRN
Qty: 40 TABLET | Refills: 0 | Status: SHIPPED | OUTPATIENT
Start: 2023-02-09

## 2023-02-09 RX ORDER — DEXAMETHASONE SODIUM PHOSPHATE 4 MG/ML
VIAL (ML) INJECTION AS NEEDED
Status: DISCONTINUED | OUTPATIENT
Start: 2023-02-09 | End: 2023-02-09 | Stop reason: SURG

## 2023-02-09 RX ORDER — SODIUM CHLORIDE, SODIUM LACTATE, POTASSIUM CHLORIDE, CALCIUM CHLORIDE 600; 310; 30; 20 MG/100ML; MG/100ML; MG/100ML; MG/100ML
INJECTION, SOLUTION INTRAVENOUS CONTINUOUS
Status: DISCONTINUED | OUTPATIENT
Start: 2023-02-09 | End: 2023-02-09

## 2023-02-09 RX ORDER — HYDROMORPHONE HYDROCHLORIDE 1 MG/ML
0.6 INJECTION, SOLUTION INTRAMUSCULAR; INTRAVENOUS; SUBCUTANEOUS EVERY 5 MIN PRN
Status: DISCONTINUED | OUTPATIENT
Start: 2023-02-09 | End: 2023-02-09

## 2023-02-09 RX ORDER — MORPHINE SULFATE 4 MG/ML
4 INJECTION, SOLUTION INTRAMUSCULAR; INTRAVENOUS EVERY 10 MIN PRN
Status: DISCONTINUED | OUTPATIENT
Start: 2023-02-09 | End: 2023-02-09

## 2023-02-09 RX ORDER — ONDANSETRON 2 MG/ML
INJECTION INTRAMUSCULAR; INTRAVENOUS AS NEEDED
Status: DISCONTINUED | OUTPATIENT
Start: 2023-02-09 | End: 2023-02-09 | Stop reason: SURG

## 2023-02-09 RX ORDER — MORPHINE SULFATE 4 MG/ML
2 INJECTION, SOLUTION INTRAMUSCULAR; INTRAVENOUS EVERY 10 MIN PRN
Status: DISCONTINUED | OUTPATIENT
Start: 2023-02-09 | End: 2023-02-09

## 2023-02-09 RX ORDER — MORPHINE SULFATE 10 MG/ML
6 INJECTION, SOLUTION INTRAMUSCULAR; INTRAVENOUS EVERY 10 MIN PRN
Status: DISCONTINUED | OUTPATIENT
Start: 2023-02-09 | End: 2023-02-09

## 2023-02-09 RX ORDER — NALOXONE HYDROCHLORIDE 0.4 MG/ML
80 INJECTION, SOLUTION INTRAMUSCULAR; INTRAVENOUS; SUBCUTANEOUS AS NEEDED
Status: DISCONTINUED | OUTPATIENT
Start: 2023-02-09 | End: 2023-02-09

## 2023-02-09 RX ORDER — HYDROMORPHONE HYDROCHLORIDE 1 MG/ML
0.4 INJECTION, SOLUTION INTRAMUSCULAR; INTRAVENOUS; SUBCUTANEOUS EVERY 5 MIN PRN
Status: DISCONTINUED | OUTPATIENT
Start: 2023-02-09 | End: 2023-02-09

## 2023-02-09 RX ORDER — PROCHLORPERAZINE EDISYLATE 5 MG/ML
5 INJECTION INTRAMUSCULAR; INTRAVENOUS EVERY 8 HOURS PRN
Status: DISCONTINUED | OUTPATIENT
Start: 2023-02-09 | End: 2023-02-09

## 2023-02-09 RX ORDER — CEFAZOLIN SODIUM/WATER 2 G/20 ML
2 SYRINGE (ML) INTRAVENOUS ONCE
Status: COMPLETED | OUTPATIENT
Start: 2023-02-09 | End: 2023-02-09

## 2023-02-09 RX ORDER — ACETAMINOPHEN 500 MG
1000 TABLET ORAL EVERY 8 HOURS SCHEDULED
Status: DISCONTINUED | OUTPATIENT
Start: 2023-02-09 | End: 2023-02-09

## 2023-02-09 RX ORDER — LIDOCAINE HYDROCHLORIDE 10 MG/ML
INJECTION, SOLUTION EPIDURAL; INFILTRATION; INTRACAUDAL; PERINEURAL AS NEEDED
Status: DISCONTINUED | OUTPATIENT
Start: 2023-02-09 | End: 2023-02-09 | Stop reason: HOSPADM

## 2023-02-09 RX ORDER — TRAMADOL HYDROCHLORIDE 50 MG/1
50 TABLET ORAL EVERY 6 HOURS PRN
Status: DISCONTINUED | OUTPATIENT
Start: 2023-02-09 | End: 2023-02-09

## 2023-02-09 RX ORDER — ACETAMINOPHEN 500 MG
1000 TABLET ORAL ONCE
Status: COMPLETED | OUTPATIENT
Start: 2023-02-09 | End: 2023-02-09

## 2023-02-09 RX ORDER — TRAMADOL HYDROCHLORIDE 50 MG/1
50 TABLET ORAL EVERY 6 HOURS PRN
Qty: 20 TABLET | Refills: 0 | Status: SHIPPED | OUTPATIENT
Start: 2023-02-09

## 2023-02-09 RX ADMIN — CEFAZOLIN SODIUM/WATER 2 G: 2 G/20 ML SYRINGE (ML) INTRAVENOUS at 13:14:00

## 2023-02-09 RX ADMIN — ONDANSETRON 4 MG: 2 INJECTION INTRAMUSCULAR; INTRAVENOUS at 13:10:00

## 2023-02-09 RX ADMIN — DEXAMETHASONE SODIUM PHOSPHATE 4 MG: 4 MG/ML VIAL (ML) INJECTION at 13:10:00

## 2023-02-09 RX ADMIN — SODIUM CHLORIDE, SODIUM LACTATE, POTASSIUM CHLORIDE, CALCIUM CHLORIDE: 600; 310; 30; 20 INJECTION, SOLUTION INTRAVENOUS at 13:02:00

## 2023-02-09 RX ADMIN — EPHEDRINE SULFATE 5 MG: 50 INJECTION INTRAVENOUS at 14:04:00

## 2023-02-09 RX ADMIN — LIDOCAINE HYDROCHLORIDE 50 MG: 10 INJECTION, SOLUTION EPIDURAL; INFILTRATION; INTRACAUDAL; PERINEURAL at 13:10:00

## 2023-02-09 NOTE — BRIEF OP NOTE
Pre-Operative Diagnosis: Cubital tunnel syndrome on left [G56.22] with ulnar neuritis     Post-Operative Diagnosis: Same     Procedure Performed:   Left elbow ulnar nerve transposition    Surgeon(s) and Role:     * Bravo Boucher MD - Primary    Assistant(s):  PA: Diogo Owusu PA-C     Anesthesia: Dr. Mendy Ferris with LMA     Surgical Findings: Left ulnar nerve transposed anteriorly. Range of motion of elbow did not have it return to previous location. Tourniquet 69 minutes at 250 mmHg. Drain: none   Specimen: none  Complications: None   Estimated Blood Loss: 10 cc  Stable to PACU.     Anirudh Philip MD  2/9/2023  1:23 PM

## 2023-02-09 NOTE — INTERVAL H&P NOTE
Pre-op Diagnosis: Cubital tunnel syndrome on left [G56.22]    The above referenced H&P was reviewed by Rubén Solomon MD on 2/9/2023, the patient was examined and no significant changes have occurred in the patient's condition since the H&P was performed. I discussed with the patient and/or legal representative the potential benefits, risks and side effects of this procedure; the likelihood of the patient achieving goals; and potential problems that might occur during recuperation. I discussed reasonable alternatives to the procedure, including risks, benefits and side effects related to the alternatives and risks related to not receiving this procedure. We will proceed with procedure as planned.

## 2023-02-09 NOTE — ANESTHESIA PROCEDURE NOTES
Airway  Date/Time: 2/9/2023 1:12 PM  Urgency: Elective      General Information and Staff    Patient location during procedure: OR  Anesthesiologist: Adolfo Burch MD  Performed: anesthesiologist     Indications and Patient Condition  Indications for airway management: anesthesia  Sedation level: deep  Preoxygenated: yes  Patient position: sniffing  Mask difficulty assessment: 1 - vent by mask    Final Airway Details  Final airway type: supraglottic airway      Successful airway: classic  Size 4       Number of attempts at approach: 1

## 2023-02-21 ENCOUNTER — OFFICE VISIT (OUTPATIENT)
Dept: FAMILY MEDICINE CLINIC | Facility: CLINIC | Age: 43
End: 2023-02-21

## 2023-02-21 VITALS
BODY MASS INDEX: 28.34 KG/M2 | DIASTOLIC BLOOD PRESSURE: 88 MMHG | HEIGHT: 64 IN | SYSTOLIC BLOOD PRESSURE: 133 MMHG | HEART RATE: 94 BPM | WEIGHT: 166 LBS | TEMPERATURE: 97 F

## 2023-02-21 DIAGNOSIS — M76.822 POSTERIOR TIBIAL TENDINITIS, LEFT: ICD-10-CM

## 2023-02-21 DIAGNOSIS — M77.8 RIGHT WRIST TENDINITIS: ICD-10-CM

## 2023-02-21 DIAGNOSIS — M25.521 RIGHT ELBOW PAIN: Primary | ICD-10-CM

## 2023-02-21 DIAGNOSIS — M25.572 ACUTE LEFT ANKLE PAIN: ICD-10-CM

## 2023-02-21 DIAGNOSIS — M77.11 LATERAL EPICONDYLITIS OF RIGHT ELBOW: ICD-10-CM

## 2023-02-21 DIAGNOSIS — M25.531 RIGHT WRIST PAIN: ICD-10-CM

## 2023-02-21 DIAGNOSIS — M76.62 ACHILLES TENDINITIS OF LEFT LOWER EXTREMITY: ICD-10-CM

## 2023-02-21 PROCEDURE — 99214 OFFICE O/P EST MOD 30 MIN: CPT | Performed by: FAMILY MEDICINE

## 2023-02-21 PROCEDURE — 3079F DIAST BP 80-89 MM HG: CPT | Performed by: FAMILY MEDICINE

## 2023-02-21 PROCEDURE — 3008F BODY MASS INDEX DOCD: CPT | Performed by: FAMILY MEDICINE

## 2023-02-21 PROCEDURE — 3075F SYST BP GE 130 - 139MM HG: CPT | Performed by: FAMILY MEDICINE

## 2023-02-28 ENCOUNTER — OFFICE VISIT (OUTPATIENT)
Dept: ORTHOPEDICS CLINIC | Facility: CLINIC | Age: 43
End: 2023-02-28

## 2023-02-28 DIAGNOSIS — G56.22 CUBITAL TUNNEL SYNDROME ON LEFT: Primary | ICD-10-CM

## 2023-02-28 DIAGNOSIS — S63.091D SUBLUXATION OF EXTENSOR CARPI ULNARIS TENDON, RIGHT, SUBSEQUENT ENCOUNTER: ICD-10-CM

## 2023-02-28 DIAGNOSIS — M77.11 LATERAL EPICONDYLITIS OF RIGHT ELBOW: ICD-10-CM

## 2023-02-28 PROCEDURE — 99024 POSTOP FOLLOW-UP VISIT: CPT | Performed by: ORTHOPAEDIC SURGERY

## 2023-03-03 ENCOUNTER — TELEPHONE (OUTPATIENT)
Facility: CLINIC | Age: 43
End: 2023-03-03

## 2023-03-06 NOTE — TELEPHONE ENCOUNTER
Patient cancelled via PAT, needs to be rescheduled. Cancelled in epic and endo.     CANCELLED for:  Colonoscopy [phone] ,[address]   Provider Name:    Date:  3/6/23  Location:   [facility]  Sedation:  Mac  Time : 12:30 Pm

## 2023-03-08 RX ORDER — TRAMADOL HYDROCHLORIDE 50 MG/1
50 TABLET ORAL EVERY 6 HOURS PRN
Qty: 20 TABLET | Refills: 0 | Status: SHIPPED | OUTPATIENT
Start: 2023-03-08

## 2023-03-08 NOTE — TELEPHONE ENCOUNTER
S/p left elbow ulnar nerve transposition on 2/9/23  Last rx given 2/9/23 #20 no refill  Do you approve?

## 2023-03-16 NOTE — TELEPHONE ENCOUNTER
From chart review, the patient had general anesthesia during her last procedure on February 9 of this year. The sedation that we use is generally much milder, propofol and  we can administer Zofran before procedure for nausea or type symptoms. RN to review above with the pt.

## 2023-03-16 NOTE — TELEPHONE ENCOUNTER
Dr.Lynch Gracia patient states she didn't do well with sedation at her last procedure done at [facility] on 02/09/2023 wants to know what other options are available

## 2023-03-17 ENCOUNTER — TELEPHONE (OUTPATIENT)
Dept: ORTHOPEDICS CLINIC | Facility: CLINIC | Age: 43
End: 2023-03-17

## 2023-03-17 NOTE — TELEPHONE ENCOUNTER
Spoke with Viky they are requesting LOV note from 2/28/23 with Dr. Layla Mo to assist in authorization process for ultrasound for patient. The fax number she provided [phone] aware and will fax.

## 2023-03-17 NOTE — TELEPHONE ENCOUNTER
Gretchen from [facility] requesting last visit notes and US images to be faxed to: [phone].  Please advise

## 2023-03-24 NOTE — TELEPHONE ENCOUNTER
WALKER Murdock I could not get in touch with the patient after multiple attempts. Left message to call back. Our office tried to contact patient on three separate occasions with no answer or call back. No response letter mailed to home address. Encounter closed per protocol.

## 2023-03-24 NOTE — TELEPHONE ENCOUNTER
Per RN's Our office tried to contact patient on three separate occasions with no answer or call back. No response letter mailed to home address. Encounter closed per protocol.

## 2023-03-28 ENCOUNTER — PATIENT MESSAGE (OUTPATIENT)
Dept: FAMILY MEDICINE CLINIC | Facility: CLINIC | Age: 43
End: 2023-03-28

## 2023-03-29 NOTE — TELEPHONE ENCOUNTER
From: Mara Sawant  To: Hay Rodrigues DO  Sent: 3/28/2023 2:19 PM CDT  Subject: Today's phone appointment (3/28/2023)    Dear Dr Favio Crandall my phone rang two times but I wasn't able to hear anybody on the other side of the phone, I called back and the answering services infirmed me that all the staff was in a meeting and that I was going to receive a call at 3:00 p.m. I just needed to confirm this with you.

## 2023-03-30 ENCOUNTER — TELEMEDICINE (OUTPATIENT)
Dept: FAMILY MEDICINE CLINIC | Facility: CLINIC | Age: 43
End: 2023-03-30

## 2023-03-30 DIAGNOSIS — M54.42 ACUTE MIDLINE LOW BACK PAIN WITH LEFT-SIDED SCIATICA: Primary | ICD-10-CM

## 2023-03-30 PROCEDURE — 99213 OFFICE O/P EST LOW 20 MIN: CPT | Performed by: FAMILY MEDICINE

## 2023-03-30 RX ORDER — TRAMADOL HYDROCHLORIDE 50 MG/1
50 TABLET ORAL EVERY 8 HOURS PRN
Qty: 30 TABLET | Refills: 0 | Status: SHIPPED | OUTPATIENT
Start: 2023-03-30

## 2023-03-30 RX ORDER — CYCLOBENZAPRINE HCL 5 MG
5 TABLET ORAL 3 TIMES DAILY PRN
Qty: 30 TABLET | Refills: 0 | Status: SHIPPED | OUTPATIENT
Start: 2023-03-30

## 2023-04-05 ENCOUNTER — TELEPHONE (OUTPATIENT)
Dept: ORTHOPEDICS CLINIC | Facility: CLINIC | Age: 43
End: 2023-04-05

## 2023-04-05 NOTE — TELEPHONE ENCOUNTER
Pt has appt with both Vita Box and [facility] please confirm which provider the pt should be seeing please advise

## 2023-04-05 NOTE — TELEPHONE ENCOUNTER
Dr. Quinn  2/28/23 per note you wanted to see patient in 4 weeks for follow up of your left arm. S/P left elbow nerve transposition 2/9/23. Patient scheduled herself on mychart for a follow up appt 4/18/23 please advise if patient should be added on 4/11/23 or can wait until 4/18/23. Thanks!

## 2023-04-05 NOTE — TELEPHONE ENCOUNTER
Looks like patient scheduled via [facility] her appt notes having her following up for left elbow with Dr. Peewee Goddard and looks like she scheduled herself to see Dr. Michelle Rivera for a different issue.

## 2023-04-06 ENCOUNTER — MED REC SCAN ONLY (OUTPATIENT)
Dept: FAMILY MEDICINE CLINIC | Facility: CLINIC | Age: 43
End: 2023-04-06

## 2023-04-10 ENCOUNTER — ORDER TRANSCRIPTION (OUTPATIENT)
Dept: PHYSICAL THERAPY | Facility: HOSPITAL | Age: 43
End: 2023-04-10

## 2023-04-10 DIAGNOSIS — G56.22 CUBITAL TUNNEL SYNDROME ON LEFT: Primary | ICD-10-CM

## 2023-04-10 DIAGNOSIS — S63.091D SUBLUXATION OF EXTENSOR CARPI ULNARIS TENDON, RIGHT, SUBSEQUENT ENCOUNTER: ICD-10-CM

## 2023-04-10 DIAGNOSIS — M77.11 LATERAL EPICONDYLITIS OF RIGHT ELBOW: ICD-10-CM

## 2023-04-11 ENCOUNTER — TELEPHONE (OUTPATIENT)
Dept: PHYSICAL THERAPY | Facility: HOSPITAL | Age: 43
End: 2023-04-11

## 2023-04-13 ENCOUNTER — APPOINTMENT (OUTPATIENT)
Dept: PHYSICAL THERAPY | Age: 43
End: 2023-04-13
Attending: ORTHOPAEDIC SURGERY
Payer: MEDICAID

## 2023-04-14 ENCOUNTER — OFFICE VISIT (OUTPATIENT)
Dept: FAMILY MEDICINE CLINIC | Facility: CLINIC | Age: 43
End: 2023-04-14

## 2023-04-14 VITALS
BODY MASS INDEX: 27.66 KG/M2 | TEMPERATURE: 97 F | SYSTOLIC BLOOD PRESSURE: 139 MMHG | WEIGHT: 162 LBS | HEIGHT: 64 IN | HEART RATE: 92 BPM | DIASTOLIC BLOOD PRESSURE: 93 MMHG

## 2023-04-14 DIAGNOSIS — M54.2 NECK PAIN ON RIGHT SIDE: ICD-10-CM

## 2023-04-14 DIAGNOSIS — M54.50 CHRONIC BILATERAL LOW BACK PAIN WITHOUT SCIATICA: ICD-10-CM

## 2023-04-14 DIAGNOSIS — R59.9 SWELLING OF LYMPH NODE: ICD-10-CM

## 2023-04-14 DIAGNOSIS — G24.5 BLEPHAROSPASM OF BOTH EYES: Primary | ICD-10-CM

## 2023-04-14 DIAGNOSIS — R25.1 LIP TREMOR: ICD-10-CM

## 2023-04-14 DIAGNOSIS — G89.29 CHRONIC BILATERAL LOW BACK PAIN WITHOUT SCIATICA: ICD-10-CM

## 2023-04-14 PROCEDURE — 99215 OFFICE O/P EST HI 40 MIN: CPT | Performed by: FAMILY MEDICINE

## 2023-04-14 PROCEDURE — 3075F SYST BP GE 130 - 139MM HG: CPT | Performed by: FAMILY MEDICINE

## 2023-04-14 PROCEDURE — 3080F DIAST BP >= 90 MM HG: CPT | Performed by: FAMILY MEDICINE

## 2023-04-14 PROCEDURE — 3008F BODY MASS INDEX DOCD: CPT | Performed by: FAMILY MEDICINE

## 2023-04-14 RX ORDER — AMOXICILLIN AND CLAVULANATE POTASSIUM 875; 125 MG/1; MG/1
1 TABLET, FILM COATED ORAL 2 TIMES DAILY
Qty: 20 TABLET | Refills: 0 | Status: SHIPPED | OUTPATIENT
Start: 2023-04-14 | End: 2023-04-24

## 2023-04-16 ENCOUNTER — PATIENT MESSAGE (OUTPATIENT)
Dept: FAMILY MEDICINE CLINIC | Facility: CLINIC | Age: 43
End: 2023-04-16

## 2023-04-16 DIAGNOSIS — G89.29 CHRONIC BILATERAL LOW BACK PAIN WITH LEFT-SIDED SCIATICA: Primary | ICD-10-CM

## 2023-04-16 DIAGNOSIS — M54.2 NECK PAIN ON RIGHT SIDE: ICD-10-CM

## 2023-04-16 DIAGNOSIS — M54.42 CHRONIC BILATERAL LOW BACK PAIN WITH LEFT-SIDED SCIATICA: Primary | ICD-10-CM

## 2023-04-17 NOTE — TELEPHONE ENCOUNTER
From: Neris Fields  To: Samy Hutchinson DO  Sent: 4/16/2023 1:35 PM CDT  Subject: Epic Order# [id number]    Can you please change to chronic bilateral low back pain with sciatica?

## 2023-04-18 ENCOUNTER — OFFICE VISIT (OUTPATIENT)
Dept: ORTHOPEDICS CLINIC | Facility: CLINIC | Age: 43
End: 2023-04-18

## 2023-04-18 DIAGNOSIS — G56.22 CUBITAL TUNNEL SYNDROME ON LEFT: ICD-10-CM

## 2023-04-18 DIAGNOSIS — S63.092D SUBLUXATION OF EXTENSOR CARPI ULNARIS TENDON, LEFT, SUBSEQUENT ENCOUNTER: ICD-10-CM

## 2023-04-18 DIAGNOSIS — M77.02 MEDIAL EPICONDYLITIS OF ELBOW, LEFT: ICD-10-CM

## 2023-04-18 DIAGNOSIS — M77.11 LATERAL EPICONDYLITIS OF RIGHT ELBOW: Primary | ICD-10-CM

## 2023-04-18 PROCEDURE — 99024 POSTOP FOLLOW-UP VISIT: CPT | Performed by: ORTHOPAEDIC SURGERY

## 2023-04-18 NOTE — TELEPHONE ENCOUNTER
Per patient she has sciatica, she would like to know if referral to pain management can be updated to reflect this diagnosis?

## 2023-04-21 ENCOUNTER — HOSPITAL ENCOUNTER (OUTPATIENT)
Age: 43
Discharge: ANOTHER HOSPITAL | End: 2023-04-21
Payer: MEDICAID

## 2023-04-21 ENCOUNTER — APPOINTMENT (OUTPATIENT)
Dept: GENERAL RADIOLOGY | Facility: HOSPITAL | Age: 43
End: 2023-04-21
Attending: EMERGENCY MEDICINE
Payer: MEDICAID

## 2023-04-21 ENCOUNTER — APPOINTMENT (OUTPATIENT)
Dept: MRI IMAGING | Facility: HOSPITAL | Age: 43
End: 2023-04-21
Attending: EMERGENCY MEDICINE
Payer: MEDICAID

## 2023-04-21 ENCOUNTER — HOSPITAL ENCOUNTER (EMERGENCY)
Facility: HOSPITAL | Age: 43
Discharge: HOME | End: 2023-04-21
Attending: EMERGENCY MEDICINE
Payer: MEDICAID

## 2023-04-21 VITALS
DIASTOLIC BLOOD PRESSURE: 86 MMHG | TEMPERATURE: 99 F | OXYGEN SATURATION: 99 % | HEART RATE: 88 BPM | RESPIRATION RATE: 20 BRPM | SYSTOLIC BLOOD PRESSURE: 138 MMHG

## 2023-04-21 VITALS
TEMPERATURE: 98 F | HEART RATE: 81 BPM | DIASTOLIC BLOOD PRESSURE: 76 MMHG | RESPIRATION RATE: 16 BRPM | SYSTOLIC BLOOD PRESSURE: 115 MMHG | WEIGHT: 160 LBS | BODY MASS INDEX: 27.31 KG/M2 | HEIGHT: 64 IN | OXYGEN SATURATION: 97 %

## 2023-04-21 DIAGNOSIS — M43.6 NECK STIFFNESS: Primary | ICD-10-CM

## 2023-04-21 DIAGNOSIS — R51.9 NONINTRACTABLE HEADACHE, UNSPECIFIED CHRONICITY PATTERN, UNSPECIFIED HEADACHE TYPE: ICD-10-CM

## 2023-04-21 DIAGNOSIS — G89.29 CHRONIC NONINTRACTABLE HEADACHE, UNSPECIFIED HEADACHE TYPE: Primary | ICD-10-CM

## 2023-04-21 DIAGNOSIS — R51.9 CHRONIC NONINTRACTABLE HEADACHE, UNSPECIFIED HEADACHE TYPE: Primary | ICD-10-CM

## 2023-04-21 LAB
ALBUMIN SERPL-MCNC: 3.7 G/DL (ref 3.4–5)
ALBUMIN/GLOB SERPL: 0.9 {RATIO} (ref 1–2)
ALP LIVER SERPL-CCNC: 119 U/L
ALT SERPL-CCNC: 45 U/L
ANION GAP SERPL CALC-SCNC: 9 MMOL/L (ref 0–18)
AST SERPL-CCNC: 32 U/L (ref 15–37)
BASOPHILS # BLD AUTO: 0.01 X10(3) UL (ref 0–0.2)
BASOPHILS NFR BLD AUTO: 0.2 %
BILIRUB SERPL-MCNC: 0.3 MG/DL (ref 0.1–2)
BILIRUB UR QL: NEGATIVE
BUN BLD-MCNC: 7 MG/DL (ref 7–18)
BUN/CREAT SERPL: 10.4 (ref 10–20)
CALCIUM BLD-MCNC: 9 MG/DL (ref 8.5–10.1)
CHLORIDE SERPL-SCNC: 103 MMOL/L (ref 98–112)
CLARITY UR: CLEAR
CO2 SERPL-SCNC: 27 MMOL/L (ref 21–32)
COLOR UR: YELLOW
CREAT BLD-MCNC: 0.67 MG/DL
DEPRECATED RDW RBC AUTO: 43 FL (ref 35.1–46.3)
EOSINOPHIL # BLD AUTO: 0.05 X10(3) UL (ref 0–0.7)
EOSINOPHIL NFR BLD AUTO: 1.1 %
ERYTHROCYTE [DISTWIDTH] IN BLOOD BY AUTOMATED COUNT: 14.1 % (ref 11–15)
GFR SERPLBLD BASED ON 1.73 SQ M-ARVRAT: 110 ML/MIN/1.73M2 (ref 60–?)
GLOBULIN PLAS-MCNC: 4.3 G/DL (ref 2.8–4.4)
GLUCOSE BLD-MCNC: 95 MG/DL (ref 70–99)
GLUCOSE UR-MCNC: NORMAL MG/DL
HCT VFR BLD AUTO: 39.6 %
HGB BLD-MCNC: 13.2 G/DL
IMM GRANULOCYTES # BLD AUTO: 0.01 X10(3) UL (ref 0–1)
IMM GRANULOCYTES NFR BLD: 0.2 %
KETONES UR-MCNC: 60 MG/DL
LACTATE SERPL-SCNC: 1.1 MMOL/L (ref 0.4–2)
LEUKOCYTE ESTERASE UR QL STRIP.AUTO: NEGATIVE
LYMPHOCYTES # BLD AUTO: 2.21 X10(3) UL (ref 1–4)
LYMPHOCYTES NFR BLD AUTO: 47.1 %
MCH RBC QN AUTO: 27.7 PG (ref 26–34)
MCHC RBC AUTO-ENTMCNC: 33.3 G/DL (ref 31–37)
MCV RBC AUTO: 83.2 FL
MONOCYTES # BLD AUTO: 0.7 X10(3) UL (ref 0.1–1)
MONOCYTES NFR BLD AUTO: 14.9 %
NEUTROPHILS # BLD AUTO: 1.71 X10 (3) UL (ref 1.5–7.7)
NEUTROPHILS # BLD AUTO: 1.71 X10(3) UL (ref 1.5–7.7)
NEUTROPHILS NFR BLD AUTO: 36.5 %
NITRITE UR QL STRIP.AUTO: NEGATIVE
OSMOLALITY SERPL CALC.SUM OF ELEC: 286 MOSM/KG (ref 275–295)
PH UR: 5 [PH] (ref 5–8)
PLATELET # BLD AUTO: 323 10(3)UL (ref 150–450)
POTASSIUM SERPL-SCNC: 3.4 MMOL/L (ref 3.5–5.1)
PROT SERPL-MCNC: 8 G/DL (ref 6.4–8.2)
RBC # BLD AUTO: 4.76 X10(6)UL
SARS-COV-2 RNA RESP QL NAA+PROBE: NOT DETECTED
SODIUM SERPL-SCNC: 139 MMOL/L (ref 136–145)
SP GR UR STRIP: 1.02 (ref 1–1.03)
UROBILINOGEN UR STRIP-ACNC: NORMAL
WBC # BLD AUTO: 4.7 X10(3) UL (ref 4–11)

## 2023-04-21 PROCEDURE — 81001 URINALYSIS AUTO W/SCOPE: CPT | Performed by: EMERGENCY MEDICINE

## 2023-04-21 PROCEDURE — 99284 EMERGENCY DEPT VISIT MOD MDM: CPT

## 2023-04-21 PROCEDURE — 96361 HYDRATE IV INFUSION ADD-ON: CPT

## 2023-04-21 PROCEDURE — 83605 ASSAY OF LACTIC ACID: CPT | Performed by: EMERGENCY MEDICINE

## 2023-04-21 PROCEDURE — 70551 MRI BRAIN STEM W/O DYE: CPT | Performed by: EMERGENCY MEDICINE

## 2023-04-21 PROCEDURE — 96375 TX/PRO/DX INJ NEW DRUG ADDON: CPT

## 2023-04-21 PROCEDURE — 96374 THER/PROPH/DIAG INJ IV PUSH: CPT

## 2023-04-21 PROCEDURE — 99285 EMERGENCY DEPT VISIT HI MDM: CPT

## 2023-04-21 PROCEDURE — 87040 BLOOD CULTURE FOR BACTERIA: CPT | Performed by: EMERGENCY MEDICINE

## 2023-04-21 PROCEDURE — 85025 COMPLETE CBC W/AUTO DIFF WBC: CPT | Performed by: EMERGENCY MEDICINE

## 2023-04-21 PROCEDURE — 71045 X-RAY EXAM CHEST 1 VIEW: CPT | Performed by: EMERGENCY MEDICINE

## 2023-04-21 PROCEDURE — 80053 COMPREHEN METABOLIC PANEL: CPT | Performed by: EMERGENCY MEDICINE

## 2023-04-21 PROCEDURE — 99213 OFFICE O/P EST LOW 20 MIN: CPT

## 2023-04-21 RX ORDER — METOCLOPRAMIDE HYDROCHLORIDE 5 MG/ML
10 INJECTION INTRAMUSCULAR; INTRAVENOUS ONCE
Status: COMPLETED | OUTPATIENT
Start: 2023-04-21 | End: 2023-04-21

## 2023-04-21 RX ORDER — DEXAMETHASONE SODIUM PHOSPHATE 4 MG/ML
10 VIAL (ML) INJECTION ONCE
Status: COMPLETED | OUTPATIENT
Start: 2023-04-21 | End: 2023-04-21

## 2023-04-21 RX ORDER — MECLIZINE HYDROCHLORIDE 25 MG/1
25 TABLET ORAL ONCE
Status: COMPLETED | OUTPATIENT
Start: 2023-04-21 | End: 2023-04-21

## 2023-04-21 RX ORDER — DIPHENHYDRAMINE HYDROCHLORIDE 50 MG/ML
12.5 INJECTION INTRAMUSCULAR; INTRAVENOUS ONCE
Status: COMPLETED | OUTPATIENT
Start: 2023-04-21 | End: 2023-04-21

## 2023-04-21 RX ORDER — DEXTROSE AND SODIUM CHLORIDE 5; .9 G/100ML; G/100ML
INJECTION, SOLUTION INTRAVENOUS ONCE
Status: COMPLETED | OUTPATIENT
Start: 2023-04-21 | End: 2023-04-21

## 2023-04-21 RX ORDER — MECLIZINE HYDROCHLORIDE 25 MG/1
25 TABLET ORAL 3 TIMES DAILY PRN
Qty: 20 TABLET | Refills: 0 | Status: SHIPPED | OUTPATIENT
Start: 2023-04-21

## 2023-04-21 NOTE — DISCHARGE INSTRUCTIONS
Continue home medications. Take meclizine as needed for dizziness. See neurology for follow-up. Return to the ER if you develop worsening symptoms or emergent concerns.

## 2023-04-21 NOTE — ED INITIAL ASSESSMENT (HPI)
Headache,neck discomfort, \" whistling on her lungs ' for 4 days, chills but no reported fever, + sob, minimal cough, no chest pain

## 2023-04-21 NOTE — ED INITIAL ASSESSMENT (HPI)
Pt presents s/p presenting to IC this AM for c/o fevers x4 days, headaches and dizziness. Pt reports vomiting the first day only. Pt reports legs feel shaky and weak. C/o anorexia and sensitivity to light and sound. Pt reports hx of surgery to right arm 1.5 years ago that became infected and the \"infection traveled to your brain. \" pt reports surgery to left arm 1.5 months ago. Pt sent from IC to r/o meningitis. Pt reports neck stiffness. Negative nuchal rigidity.

## 2023-04-21 NOTE — CM/SW NOTE
Received a call to help expedite a neuro follow up for the pt. She has an appt for 7/24 with Dr. Sharron Trivedi . I tried calling but the phone hours are only till 1 today. I will placed a follow up order for neurology with any provider sooner than 7/24.

## 2023-04-24 ENCOUNTER — PATIENT OUTREACH (OUTPATIENT)
Dept: CASE MANAGEMENT | Age: 43
End: 2023-04-24

## 2023-04-24 NOTE — PROGRESS NOTES
1st attempt Neuro apt request    Marnie Tabor  [address] [facility]  [phone]    Unable to contact patient. LMTCB

## 2023-04-25 ENCOUNTER — OFFICE VISIT (OUTPATIENT)
Dept: PHYSICAL THERAPY | Age: 43
End: 2023-04-25
Attending: ORTHOPAEDIC SURGERY
Payer: MEDICAID

## 2023-04-25 ENCOUNTER — APPOINTMENT (OUTPATIENT)
Dept: PHYSICAL THERAPY | Age: 43
End: 2023-04-25
Attending: ORTHOPAEDIC SURGERY
Payer: MEDICAID

## 2023-04-25 PROCEDURE — 97166 OT EVAL MOD COMPLEX 45 MIN: CPT

## 2023-04-25 PROCEDURE — 97110 THERAPEUTIC EXERCISES: CPT

## 2023-04-25 NOTE — PROGRESS NOTES
2nd attempt Neuro apt request  [place] [address] [facility]  [phone]    Unable to contact patient. LMTCB

## 2023-04-26 NOTE — PROGRESS NOTES
3rd attempt Neuro apt request  Kenya Anderson  [address] [facility] [phone]    Unable to contact patient after multiple attempts.  LMTCB

## 2023-04-27 ENCOUNTER — APPOINTMENT (OUTPATIENT)
Dept: PHYSICAL THERAPY | Age: 43
End: 2023-04-27
Attending: ORTHOPAEDIC SURGERY
Payer: MEDICAID

## 2023-04-28 ENCOUNTER — OFFICE VISIT (OUTPATIENT)
Dept: PHYSICAL THERAPY | Age: 43
End: 2023-04-28
Attending: ORTHOPAEDIC SURGERY
Payer: MEDICAID

## 2023-04-28 PROCEDURE — 97140 MANUAL THERAPY 1/> REGIONS: CPT

## 2023-04-28 PROCEDURE — 97035 APP MDLTY 1+ULTRASOUND EA 15: CPT

## 2023-04-28 PROCEDURE — 97112 NEUROMUSCULAR REEDUCATION: CPT

## 2023-05-01 ENCOUNTER — OFFICE VISIT (OUTPATIENT)
Dept: PHYSICAL THERAPY | Age: 43
End: 2023-05-01
Attending: ORTHOPAEDIC SURGERY
Payer: MEDICAID

## 2023-05-01 ENCOUNTER — TELEPHONE (OUTPATIENT)
Dept: ORTHOPEDICS CLINIC | Facility: CLINIC | Age: 43
End: 2023-05-01

## 2023-05-01 ENCOUNTER — APPOINTMENT (OUTPATIENT)
Dept: PHYSICAL THERAPY | Age: 43
End: 2023-05-01
Attending: ORTHOPAEDIC SURGERY
Payer: MEDICAID

## 2023-05-01 DIAGNOSIS — S63.091D SUBLUXATION OF EXTENSOR CARPI ULNARIS TENDON, RIGHT, SUBSEQUENT ENCOUNTER: ICD-10-CM

## 2023-05-01 DIAGNOSIS — G56.02 CARPAL TUNNEL SYNDROME, LEFT: ICD-10-CM

## 2023-05-01 DIAGNOSIS — M77.11 LATERAL EPICONDYLITIS OF RIGHT ELBOW: ICD-10-CM

## 2023-05-01 DIAGNOSIS — M25.522 LEFT ELBOW PAIN: ICD-10-CM

## 2023-05-01 DIAGNOSIS — M77.02 MEDIAL EPICONDYLITIS OF LEFT ELBOW: ICD-10-CM

## 2023-05-01 DIAGNOSIS — M25.531 RIGHT WRIST PAIN: ICD-10-CM

## 2023-05-01 DIAGNOSIS — M77.02 MEDIAL EPICONDYLITIS OF ELBOW, LEFT: ICD-10-CM

## 2023-05-01 DIAGNOSIS — M25.531 BILATERAL WRIST PAIN: ICD-10-CM

## 2023-05-01 DIAGNOSIS — G56.22 CUBITAL TUNNEL SYNDROME ON LEFT: ICD-10-CM

## 2023-05-01 DIAGNOSIS — G56.22 CUBITAL TUNNEL SYNDROME ON LEFT: Primary | ICD-10-CM

## 2023-05-01 DIAGNOSIS — M25.532 BILATERAL WRIST PAIN: ICD-10-CM

## 2023-05-01 DIAGNOSIS — S63.092D SUBLUXATION OF EXTENSOR CARPI ULNARIS TENDON, LEFT, SUBSEQUENT ENCOUNTER: ICD-10-CM

## 2023-05-01 PROCEDURE — 97035 APP MDLTY 1+ULTRASOUND EA 15: CPT

## 2023-05-01 PROCEDURE — 97140 MANUAL THERAPY 1/> REGIONS: CPT

## 2023-05-01 PROCEDURE — 97110 THERAPEUTIC EXERCISES: CPT

## 2023-05-01 NOTE — TELEPHONE ENCOUNTER
Asking for notes for MRI of elbow and wrist - clinical notes stating why pt needs these MRIs done     Fax - [phone]

## 2023-05-01 NOTE — TELEPHONE ENCOUNTER
Per pt states 4/18 OV note needs to be corrected, states for right elbow it was put that no further treatment was needed at this time but pt states she was given OT order, needs to state that pt will start OT for right elbow. Please advise thank you.

## 2023-05-02 ENCOUNTER — TELEPHONE (OUTPATIENT)
Dept: ORTHOPEDICS CLINIC | Facility: CLINIC | Age: 43
End: 2023-05-02

## 2023-05-02 NOTE — TELEPHONE ENCOUNTER
[facility] asking for the following order's to be sent to them appt tomorrow  please advise     Fax # [phone]    MRI WRIST, RIGHT ([id number]) (Order #[id number]) on 4/18/23    MRI ELBOW, RIGHT ([id number]=[id number]) (Order #[id number]) on 4/18/23

## 2023-05-02 NOTE — TELEPHONE ENCOUNTER
Dr. Cota Dense patient requesting the 4/10/23 order for OT to be changed to PT. See 4/10/23 Occupational Therapy Order: \"If the patient can be seen sooner with a PT vs an OT, can we cancel this order and replace with a PT order?  No\"     Please advise if this can be altered per patient request.

## 2023-05-03 ENCOUNTER — APPOINTMENT (OUTPATIENT)
Dept: PHYSICAL THERAPY | Age: 43
End: 2023-05-03
Attending: ORTHOPAEDIC SURGERY
Payer: MEDICAID

## 2023-05-03 ENCOUNTER — HOSPITAL ENCOUNTER (OUTPATIENT)
Age: 43
Discharge: HOME | End: 2023-05-03
Payer: MEDICAID

## 2023-05-03 VITALS
TEMPERATURE: 99 F | OXYGEN SATURATION: 99 % | DIASTOLIC BLOOD PRESSURE: 90 MMHG | HEART RATE: 80 BPM | RESPIRATION RATE: 18 BRPM | SYSTOLIC BLOOD PRESSURE: 140 MMHG

## 2023-05-03 DIAGNOSIS — R06.02 SHORTNESS OF BREATH: ICD-10-CM

## 2023-05-03 DIAGNOSIS — J40 BRONCHITIS: Primary | ICD-10-CM

## 2023-05-03 DIAGNOSIS — R42 DIZZINESS: ICD-10-CM

## 2023-05-03 LAB
#MXD IC: 0.5 X10ˆ3/UL (ref 0.1–1)
ATRIAL RATE: 84 BPM
BUN BLD-MCNC: 11 MG/DL (ref 7–18)
CHLORIDE BLD-SCNC: 103 MMOL/L (ref 98–112)
CO2 BLD-SCNC: 23 MMOL/L (ref 21–32)
CREAT BLD-MCNC: 0.6 MG/DL
DDIMER WHOLE BLOOD: <200 NG/ML DDU (ref ?–400)
GFR SERPLBLD BASED ON 1.73 SQ M-ARVRAT: 113 ML/MIN/1.73M2 (ref 60–?)
GLUCOSE BLD-MCNC: 84 MG/DL (ref 70–99)
HCT VFR BLD AUTO: 41 %
HCT VFR BLD CALC: 43 %
HGB BLD-MCNC: 13.4 G/DL
ISTAT IONIZED CALCIUM FOR CHEM 8: 1.14 MMOL/L (ref 1.12–1.32)
LYMPHOCYTES # BLD AUTO: 4.1 X10ˆ3/UL (ref 1–4)
LYMPHOCYTES NFR BLD AUTO: 43.7 %
MCH RBC QN AUTO: 27.5 PG (ref 26–34)
MCHC RBC AUTO-ENTMCNC: 32.7 G/DL (ref 31–37)
MCV RBC AUTO: 84 FL (ref 80–100)
MIXED CELL %: 5.7 %
NEUTROPHILS # BLD AUTO: 4.7 X10ˆ3/UL (ref 1.5–7.7)
NEUTROPHILS NFR BLD AUTO: 50.6 %
P AXIS: 1 DEGREES
P-R INTERVAL: 150 MS
PLATELET # BLD AUTO: 379 X10ˆ3/UL (ref 150–450)
POTASSIUM BLD-SCNC: 3.8 MMOL/L (ref 3.6–5.1)
Q-T INTERVAL: 402 MS
QRS DURATION: 86 MS
QTC CALCULATION (BEZET): 475 MS
R AXIS: 23 DEGREES
RBC # BLD AUTO: 4.88 X10ˆ6/UL
SODIUM BLD-SCNC: 137 MMOL/L (ref 136–145)
T AXIS: 17 DEGREES
TROPONIN I BLD-MCNC: <0.02 NG/ML
VENTRICULAR RATE: 84 BPM
WBC # BLD AUTO: 9.3 X10ˆ3/UL (ref 4–11)

## 2023-05-03 PROCEDURE — 84484 ASSAY OF TROPONIN QUANT: CPT | Performed by: NURSE PRACTITIONER

## 2023-05-03 PROCEDURE — 93000 ELECTROCARDIOGRAM COMPLETE: CPT | Performed by: NURSE PRACTITIONER

## 2023-05-03 PROCEDURE — 36415 COLL VENOUS BLD VENIPUNCTURE: CPT | Performed by: NURSE PRACTITIONER

## 2023-05-03 PROCEDURE — 94640 AIRWAY INHALATION TREATMENT: CPT | Performed by: NURSE PRACTITIONER

## 2023-05-03 PROCEDURE — 85025 COMPLETE CBC W/AUTO DIFF WBC: CPT | Performed by: NURSE PRACTITIONER

## 2023-05-03 PROCEDURE — 80047 BASIC METABLC PNL IONIZED CA: CPT | Performed by: NURSE PRACTITIONER

## 2023-05-03 PROCEDURE — 99214 OFFICE O/P EST MOD 30 MIN: CPT | Performed by: NURSE PRACTITIONER

## 2023-05-03 RX ORDER — MECLIZINE HYDROCHLORIDE 25 MG/1
25 TABLET ORAL 3 TIMES DAILY PRN
Qty: 20 TABLET | Refills: 0 | Status: SHIPPED | OUTPATIENT
Start: 2023-05-03 | End: 2023-05-03

## 2023-05-03 RX ORDER — ALBUTEROL SULFATE 90 UG/1
2 AEROSOL, METERED RESPIRATORY (INHALATION) EVERY 4 HOURS PRN
Qty: 1 EACH | Refills: 0 | Status: SHIPPED | OUTPATIENT
Start: 2023-05-03 | End: 2023-05-03

## 2023-05-03 RX ORDER — BENZONATATE 100 MG/1
100 CAPSULE ORAL 3 TIMES DAILY PRN
Qty: 20 CAPSULE | Refills: 0 | Status: SHIPPED | OUTPATIENT
Start: 2023-05-03

## 2023-05-03 RX ORDER — BENZONATATE 100 MG/1
100 CAPSULE ORAL 3 TIMES DAILY PRN
Qty: 20 CAPSULE | Refills: 0 | Status: SHIPPED | OUTPATIENT
Start: 2023-05-03 | End: 2023-05-03

## 2023-05-03 RX ORDER — MECLIZINE HYDROCHLORIDE 25 MG/1
25 TABLET ORAL 3 TIMES DAILY PRN
Qty: 20 TABLET | Refills: 0 | Status: SHIPPED | OUTPATIENT
Start: 2023-05-03

## 2023-05-03 RX ORDER — IPRATROPIUM BROMIDE AND ALBUTEROL SULFATE 2.5; .5 MG/3ML; MG/3ML
3 SOLUTION RESPIRATORY (INHALATION) ONCE
Status: COMPLETED | OUTPATIENT
Start: 2023-05-03 | End: 2023-05-03

## 2023-05-03 RX ORDER — ALBUTEROL SULFATE 90 UG/1
2 AEROSOL, METERED RESPIRATORY (INHALATION) EVERY 4 HOURS PRN
Qty: 1 EACH | Refills: 0 | Status: SHIPPED | OUTPATIENT
Start: 2023-05-03 | End: 2023-06-02

## 2023-05-03 NOTE — DISCHARGE INSTRUCTIONS
Follow-up with your primary care provider 2 to 3 days call the office and inform them that you have been seen in the emergency department and at the immediate care center and that you need to be seen in office. As discussed take the meclizine every 8 hours 1 tablet if you are still feeling dizziness only 1 time take an extra meclizine for total of 50 mg and then you would continue every 8 hours as needed for dizziness. Use the albuterol inhaler to help with cough and wheezing take the benzonatate to help with the cough. Drink plenty of fluids warm tea with honey. If you develop worsening shortness of breath worsening dizziness develop a headache with the dizziness nausea vomiting diarrhea weakness or numbness/tingling to extremities call 911 go to the nearest emergency department.

## 2023-05-03 NOTE — TELEPHONE ENCOUNTER
Dr. Kit oMreno patient reports was provided 4/10/23 OT order with Dx: Cubital tunnel syndrome on left (G56.22)    Per patient needs 4/18/23 note to reflect in plan that she needs OT- patient reports this is workers comp claim and needs to submit updated note to . Per 4/18/23 note plan:\" For the left elbow ulnar nerve transposition, no further treatment is necessary. If she develops problems, she will see me as needed. \"    Patient is attending OT for left elbow and would like 4/18/23 note to reflect that please advise.

## 2023-05-03 NOTE — TELEPHONE ENCOUNTER
I am not clear on the request. She has multiple diagnoses for both left and right upper extremities. Please have her clarify which specific diagnoses and which side she is requesting therapy for.  I can then assess better the request.

## 2023-05-03 NOTE — TELEPHONE ENCOUNTER
To clarify on 2/28/23 you ordered PT for the left cubital tunnel syndrome and right lateral epicondylitis- on 4/10/23 the order was converted to OT. Pt requesting that your 4/18/23 note states that you are still recommending OT for the left elbow. Currently your note says no further treatment for left elbow unless needed. Please advise.

## 2023-05-03 NOTE — ED INITIAL ASSESSMENT (HPI)
Pt here w c/o dizziness, SOB and feeling like she's wheezing. States was seen in ER 2 wks ago and had workup and chest xray and MRI. Was put on meclozine and tramadol but states makes dizziness worse.

## 2023-05-08 ENCOUNTER — APPOINTMENT (OUTPATIENT)
Dept: PHYSICAL THERAPY | Age: 43
End: 2023-05-08
Attending: ORTHOPAEDIC SURGERY
Payer: MEDICAID

## 2023-05-08 ENCOUNTER — OFFICE VISIT (OUTPATIENT)
Dept: SURGERY | Facility: CLINIC | Age: 43
End: 2023-05-08

## 2023-05-08 DIAGNOSIS — R31.0 GROSS HEMATURIA: Primary | ICD-10-CM

## 2023-05-08 DIAGNOSIS — N39.41 URGENCY INCONTINENCE: ICD-10-CM

## 2023-05-08 PROCEDURE — 99204 OFFICE O/P NEW MOD 45 MIN: CPT | Performed by: UROLOGY

## 2023-05-09 ENCOUNTER — OFFICE VISIT (OUTPATIENT)
Dept: PHYSICAL THERAPY | Age: 43
End: 2023-05-09
Attending: ORTHOPAEDIC SURGERY
Payer: MEDICAID

## 2023-05-09 PROCEDURE — 97140 MANUAL THERAPY 1/> REGIONS: CPT

## 2023-05-09 PROCEDURE — 97110 THERAPEUTIC EXERCISES: CPT

## 2023-05-11 ENCOUNTER — OFFICE VISIT (OUTPATIENT)
Dept: PHYSICAL THERAPY | Age: 43
End: 2023-05-11
Attending: ORTHOPAEDIC SURGERY
Payer: MEDICAID

## 2023-05-11 PROCEDURE — 97110 THERAPEUTIC EXERCISES: CPT

## 2023-05-11 PROCEDURE — 97140 MANUAL THERAPY 1/> REGIONS: CPT

## 2023-05-15 NOTE — TELEPHONE ENCOUNTER
Dr Ana Crowley, her last appt was 10/2016 and she didn't do a follow up appt on the lab work, would you like to do labs or just have her see you in the office?   Vitamin d 02270 last filled feb 2017  Atorvastatin last filled feb 2017 [Time Spent: ___ minutes] : I have spent [unfilled] minutes of time on the encounter.

## 2023-05-16 ENCOUNTER — APPOINTMENT (OUTPATIENT)
Dept: PHYSICAL THERAPY | Age: 43
End: 2023-05-16
Attending: ORTHOPAEDIC SURGERY
Payer: MEDICAID

## 2023-05-18 ENCOUNTER — OFFICE VISIT (OUTPATIENT)
Dept: PHYSICAL THERAPY | Age: 43
End: 2023-05-18
Attending: ORTHOPAEDIC SURGERY
Payer: MEDICAID

## 2023-05-18 PROCEDURE — 97140 MANUAL THERAPY 1/> REGIONS: CPT

## 2023-05-18 PROCEDURE — 97110 THERAPEUTIC EXERCISES: CPT

## 2023-05-22 ENCOUNTER — TELEMEDICINE (OUTPATIENT)
Dept: TELEHEALTH | Age: 43
End: 2023-05-22

## 2023-05-22 ENCOUNTER — TELEPHONE (OUTPATIENT)
Dept: PHYSICAL THERAPY | Facility: HOSPITAL | Age: 43
End: 2023-05-22

## 2023-05-22 DIAGNOSIS — Z02.9 ADMINISTRATIVE ENCOUNTER: Primary | ICD-10-CM

## 2023-05-23 ENCOUNTER — APPOINTMENT (OUTPATIENT)
Dept: PHYSICAL THERAPY | Age: 43
End: 2023-05-23
Attending: ORTHOPAEDIC SURGERY
Payer: MEDICAID

## 2023-05-25 ENCOUNTER — APPOINTMENT (OUTPATIENT)
Dept: PHYSICAL THERAPY | Age: 43
End: 2023-05-25
Attending: ORTHOPAEDIC SURGERY
Payer: MEDICAID

## 2023-05-26 ENCOUNTER — TELEPHONE (OUTPATIENT)
Dept: FAMILY MEDICINE CLINIC | Facility: CLINIC | Age: 43
End: 2023-05-26

## 2023-05-26 ENCOUNTER — PATIENT MESSAGE (OUTPATIENT)
Dept: FAMILY MEDICINE CLINIC | Facility: CLINIC | Age: 43
End: 2023-05-26

## 2023-05-26 NOTE — TELEPHONE ENCOUNTER
Patient states ever since being bitten by a \"kissing bug,\" she's not been feeling the same and asking for a follow up appt with Dr. Anthony Pérez.   Scheduled 6/1 at 1:30 pm.

## 2023-05-28 NOTE — TELEPHONE ENCOUNTER
[facility] staff, please print letter for Pt per request.  Pt has upcoming appointments with Dr. Giovanny Vyas   Date Time Provider Bella Blanco   5/30/2023  9:30 AM Travis Buitrago, OT LMB [facility] [place]   5/30/2023  3:20 PM XU Polk [facility]   6/1/2023 11:00 AM Travis Buitrago, OT LMB [facility] [place]   6/1/2023  1:30 PM [facility]     To: Kalyan Boudreaux DO  Sent: 5/26/2023  7:34 PM CDT  Subject: Please print this letter and have it ready for our next appointment. Please see the letter attached.

## 2023-05-30 ENCOUNTER — TELEPHONE (OUTPATIENT)
Dept: FAMILY MEDICINE CLINIC | Facility: CLINIC | Age: 43
End: 2023-05-30

## 2023-05-30 ENCOUNTER — OFFICE VISIT (OUTPATIENT)
Dept: FAMILY MEDICINE CLINIC | Facility: CLINIC | Age: 43
End: 2023-05-30

## 2023-05-30 ENCOUNTER — LAB ENCOUNTER (OUTPATIENT)
Dept: LAB | Age: 43
End: 2023-05-30
Attending: NURSE PRACTITIONER
Payer: MEDICAID

## 2023-05-30 ENCOUNTER — PATIENT MESSAGE (OUTPATIENT)
Dept: FAMILY MEDICINE CLINIC | Facility: CLINIC | Age: 43
End: 2023-05-30

## 2023-05-30 ENCOUNTER — APPOINTMENT (OUTPATIENT)
Dept: PHYSICAL THERAPY | Age: 43
End: 2023-05-30
Attending: ORTHOPAEDIC SURGERY
Payer: MEDICAID

## 2023-05-30 VITALS
WEIGHT: 153 LBS | HEIGHT: 64 IN | HEART RATE: 97 BPM | DIASTOLIC BLOOD PRESSURE: 88 MMHG | SYSTOLIC BLOOD PRESSURE: 129 MMHG | BODY MASS INDEX: 26.12 KG/M2

## 2023-05-30 DIAGNOSIS — M77.11 LATERAL EPICONDYLITIS OF BOTH ELBOWS: ICD-10-CM

## 2023-05-30 DIAGNOSIS — G43.711 INTRACTABLE CHRONIC MIGRAINE WITHOUT AURA AND WITH STATUS MIGRAINOSUS: ICD-10-CM

## 2023-05-30 DIAGNOSIS — R93.0 ABNORMAL MRI OF HEAD: ICD-10-CM

## 2023-05-30 DIAGNOSIS — M77.12 LATERAL EPICONDYLITIS OF BOTH ELBOWS: ICD-10-CM

## 2023-05-30 DIAGNOSIS — R79.82 CRP ELEVATED: Primary | ICD-10-CM

## 2023-05-30 DIAGNOSIS — M54.2 NECK PAIN: ICD-10-CM

## 2023-05-30 DIAGNOSIS — R79.82 CRP ELEVATED: ICD-10-CM

## 2023-05-30 LAB
CRP SERPL-MCNC: 0.49 MG/DL (ref ?–0.3)
ERYTHROCYTE [SEDIMENTATION RATE] IN BLOOD: 42 MM/HR
RHEUMATOID FACT SERPL-ACNC: <10 IU/ML (ref ?–15)

## 2023-05-30 PROCEDURE — 36415 COLL VENOUS BLD VENIPUNCTURE: CPT

## 2023-05-30 PROCEDURE — 86140 C-REACTIVE PROTEIN: CPT

## 2023-05-30 PROCEDURE — 86038 ANTINUCLEAR ANTIBODIES: CPT

## 2023-05-30 PROCEDURE — 3079F DIAST BP 80-89 MM HG: CPT | Performed by: NURSE PRACTITIONER

## 2023-05-30 PROCEDURE — 86200 CCP ANTIBODY: CPT

## 2023-05-30 PROCEDURE — 85652 RBC SED RATE AUTOMATED: CPT

## 2023-05-30 PROCEDURE — 3074F SYST BP LT 130 MM HG: CPT | Performed by: NURSE PRACTITIONER

## 2023-05-30 PROCEDURE — 86225 DNA ANTIBODY NATIVE: CPT

## 2023-05-30 PROCEDURE — 3008F BODY MASS INDEX DOCD: CPT | Performed by: NURSE PRACTITIONER

## 2023-05-30 PROCEDURE — 86431 RHEUMATOID FACTOR QUANT: CPT

## 2023-05-30 PROCEDURE — 99214 OFFICE O/P EST MOD 30 MIN: CPT | Performed by: NURSE PRACTITIONER

## 2023-05-30 NOTE — TELEPHONE ENCOUNTER
See Other TE 5/26/23 from [place] regarding letter. Patient was seen in the office today by Darline Santiago 5/30/23. Lab orders are on the chart. Nephros message was sent to patient regarding Dr Latesha Haysure message below.

## 2023-05-30 NOTE — TELEPHONE ENCOUNTER
See other TE 5/30/23 from Dr Latesha Naidu. PCP response sent to patient via [employer].

## 2023-05-30 NOTE — ASSESSMENT & PLAN NOTE
Rheum panel ordered  Continue pt and ot  May need follow up w rheum if inflammatory numbers are elevated

## 2023-05-30 NOTE — TELEPHONE ENCOUNTER
Please explain to her that I went through her letter requesting a comprehensive parasitic infection testing. Primary care usually does not order this and I looked in our hospital lab tests and many of the tests that she wants are not even shown. I would be more than happy to send her to an infectious disease doctor as that may be a better place to go for her multiple complaints to see if they can come up with a plan on what are the best tests to order. She can cancel her appointment with me and I can do a referral to infectious disease if she likes.

## 2023-06-01 ENCOUNTER — TELEPHONE (OUTPATIENT)
Dept: PHYSICAL THERAPY | Facility: HOSPITAL | Age: 43
End: 2023-06-01

## 2023-06-01 ENCOUNTER — VIRTUAL PHONE E/M (OUTPATIENT)
Dept: FAMILY MEDICINE CLINIC | Facility: CLINIC | Age: 43
End: 2023-06-01
Payer: MEDICAID

## 2023-06-01 ENCOUNTER — APPOINTMENT (OUTPATIENT)
Dept: PHYSICAL THERAPY | Age: 43
End: 2023-06-01
Attending: ORTHOPAEDIC SURGERY
Payer: MEDICAID

## 2023-06-01 DIAGNOSIS — R14.0 ABDOMINAL BLOATING: Primary | ICD-10-CM

## 2023-06-01 DIAGNOSIS — Z12.11 SCREENING FOR COLON CANCER: ICD-10-CM

## 2023-06-01 DIAGNOSIS — M54.50 CHRONIC MIDLINE LOW BACK PAIN WITHOUT SCIATICA: ICD-10-CM

## 2023-06-01 DIAGNOSIS — M79.7 FIBROMYALGIA: ICD-10-CM

## 2023-06-01 DIAGNOSIS — G44.229 CHRONIC TENSION-TYPE HEADACHE, NOT INTRACTABLE: ICD-10-CM

## 2023-06-01 DIAGNOSIS — K59.09 OTHER CONSTIPATION: ICD-10-CM

## 2023-06-01 DIAGNOSIS — M79.10 MYALGIA: ICD-10-CM

## 2023-06-01 DIAGNOSIS — G89.29 CHRONIC MIDLINE LOW BACK PAIN WITHOUT SCIATICA: ICD-10-CM

## 2023-06-01 DIAGNOSIS — Z72.821 POOR SLEEP HYGIENE: ICD-10-CM

## 2023-06-01 DIAGNOSIS — F32.A DEPRESSIVE DISORDER: ICD-10-CM

## 2023-06-01 DIAGNOSIS — R79.82 CRP ELEVATED: ICD-10-CM

## 2023-06-01 DIAGNOSIS — R53.83 LETHARGY: ICD-10-CM

## 2023-06-01 DIAGNOSIS — R11.0 NAUSEA: ICD-10-CM

## 2023-06-01 LAB
CCP IGG SERPL-ACNC: 1 U/ML (ref 0–6.9)
DSDNA IGG SERPL IA-ACNC: 1 IU/ML
ENA AB SER QL IA: 0.2 UG/L
ENA AB SER QL IA: NEGATIVE

## 2023-06-01 PROCEDURE — 99215 OFFICE O/P EST HI 40 MIN: CPT | Performed by: FAMILY MEDICINE

## 2023-06-01 RX ORDER — DULOXETIN HYDROCHLORIDE 30 MG/1
30 CAPSULE, DELAYED RELEASE ORAL DAILY
Qty: 180 CAPSULE | Refills: 0 | Status: SHIPPED | OUTPATIENT
Start: 2023-06-01

## 2023-06-01 NOTE — TELEPHONE ENCOUNTER
From: Yamile Frey  To: XU Ellington  Sent: 5/30/2023 1:26 PM CDT  Subject: Today's video call appointment    Good afternoon, please note that I am fighting my long-term disability benefits with it help of my , my  would like to have a dial number so he can join today's appointment.  Please let me know if that's possible (my attorneys number is [phone])

## 2023-06-01 NOTE — TELEPHONE ENCOUNTER
Patient scheduled for visit this afternoon. Future Appointments   Date Time Provider Bella Blanco   6/1/2023  1:30 PM Sergio Ford DO [facility]   6/5/2023  8:30 AM LMB CT RM1 LMB MOB.  CT EM [place]   6/5/2023  9:30 AM Ashvin Santana MD [place] [facility]   6/6/2023  9:30 AM Jullie Ganser, OT L[facility] [place]   6/8/2023  9:30 AM Jullie Ganser, OT [facility] [place]   6/13/2023  9:30 AM Jullie Ganser, OT [facility] [place]   6/20/2023  3:00 PM Jullie Ganser, OT L[facility] ADLT [facility] [place]   6/20/2023  3:30 PM Moriah Dixon MD [facility] OF THE [facility]   7/11/2023  7:20 AM Nitin Kim MD PM[facility]R [facility]  [facility] [place]   7/26/2023  9:30 AM DO Sunni Araya [facility] OF THE [facility]   9/20/2023  7:30 AM [facility] Edge [facility] [place]   9/22/2023  8:30 AM Janey Koyanagi, Lynn Stover MD [facility]) EC [facility]

## 2023-06-05 ENCOUNTER — HOSPITAL ENCOUNTER (OUTPATIENT)
Dept: CT IMAGING | Age: 43
Discharge: HOME | End: 2023-06-05
Attending: UROLOGY
Payer: MEDICAID

## 2023-06-05 ENCOUNTER — OFFICE VISIT (OUTPATIENT)
Dept: ORTHOPEDICS CLINIC | Facility: CLINIC | Age: 43
End: 2023-06-05

## 2023-06-05 ENCOUNTER — HOSPITAL ENCOUNTER (EMERGENCY)
Facility: HOSPITAL | Age: 43
Discharge: HOME | End: 2023-06-05
Attending: EMERGENCY MEDICINE
Payer: MEDICAID

## 2023-06-05 VITALS
OXYGEN SATURATION: 98 % | RESPIRATION RATE: 23 BRPM | SYSTOLIC BLOOD PRESSURE: 134 MMHG | DIASTOLIC BLOOD PRESSURE: 80 MMHG | HEART RATE: 79 BPM | WEIGHT: 150 LBS | BODY MASS INDEX: 26 KG/M2 | TEMPERATURE: 98 F

## 2023-06-05 DIAGNOSIS — R31.0 GROSS HEMATURIA: ICD-10-CM

## 2023-06-05 DIAGNOSIS — T78.40XA ALLERGIC REACTION, INITIAL ENCOUNTER: Primary | ICD-10-CM

## 2023-06-05 DIAGNOSIS — M54.2 NECK PAIN: ICD-10-CM

## 2023-06-05 DIAGNOSIS — M77.11 LATERAL EPICONDYLITIS OF RIGHT ELBOW: Primary | ICD-10-CM

## 2023-06-05 DIAGNOSIS — M25.531 RIGHT WRIST PAIN: ICD-10-CM

## 2023-06-05 LAB
CREAT BLD-MCNC: 0.8 MG/DL
GFR SERPLBLD BASED ON 1.73 SQ M-ARVRAT: 93 ML/MIN/1.73M2 (ref 60–?)

## 2023-06-05 PROCEDURE — 82565 ASSAY OF CREATININE: CPT

## 2023-06-05 PROCEDURE — 76377 3D RENDER W/INTRP POSTPROCES: CPT | Performed by: UROLOGY

## 2023-06-05 PROCEDURE — 74178 CT ABD&PLV WO CNTR FLWD CNTR: CPT | Performed by: UROLOGY

## 2023-06-05 PROCEDURE — 99283 EMERGENCY DEPT VISIT LOW MDM: CPT

## 2023-06-05 PROCEDURE — 99282 EMERGENCY DEPT VISIT SF MDM: CPT

## 2023-06-05 RX ORDER — METHYLPREDNISOLONE SODIUM SUCCINATE 125 MG/2ML
125 INJECTION, POWDER, LYOPHILIZED, FOR SOLUTION INTRAMUSCULAR; INTRAVENOUS ONCE
Status: DISCONTINUED | OUTPATIENT
Start: 2023-06-05 | End: 2023-06-05

## 2023-06-05 RX ORDER — DIPHENHYDRAMINE HCL 25 MG
50 CAPSULE ORAL ONCE
Status: COMPLETED | OUTPATIENT
Start: 2023-06-05 | End: 2023-06-05

## 2023-06-05 RX ORDER — DIPHENHYDRAMINE HYDROCHLORIDE 50 MG/ML
25 INJECTION INTRAMUSCULAR; INTRAVENOUS ONCE
Status: DISCONTINUED | OUTPATIENT
Start: 2023-06-05 | End: 2023-06-05

## 2023-06-05 RX ORDER — FAMOTIDINE 10 MG/ML
20 INJECTION, SOLUTION INTRAVENOUS ONCE
Status: DISCONTINUED | OUTPATIENT
Start: 2023-06-05 | End: 2023-06-05

## 2023-06-05 NOTE — ED INITIAL ASSESSMENT (HPI)
Patient complains of throat pain since today, states she got a ct scan today and received the contrast dye, patient tolerating oral secretions during triage

## 2023-06-05 NOTE — ED QUICK NOTES
Patient expresses she does not want IV or IM meds at this time. MD notified and to bedside to eval. Pt axox4, respirations non labored no airway compromise at this time.

## 2023-06-05 NOTE — DISCHARGE INSTRUCTIONS
Continue Benadryl 25 to 50 mg every 6 hours for the next 2 days and then as needed for symptoms thereafter. Return to the emergency department immediately if you develop worsening symptoms, vomiting or diarrhea, lightheadedness or fainting, difficulty breathing, swelling in your mouth or throat, or any emergent concerns.

## 2023-06-06 ENCOUNTER — OFFICE VISIT (OUTPATIENT)
Dept: PHYSICAL THERAPY | Age: 43
End: 2023-06-06
Attending: ORTHOPAEDIC SURGERY
Payer: MEDICAID

## 2023-06-06 PROCEDURE — 97110 THERAPEUTIC EXERCISES: CPT

## 2023-06-06 PROCEDURE — 97140 MANUAL THERAPY 1/> REGIONS: CPT

## 2023-06-08 ENCOUNTER — APPOINTMENT (OUTPATIENT)
Dept: PHYSICAL THERAPY | Age: 43
End: 2023-06-08
Attending: ORTHOPAEDIC SURGERY
Payer: MEDICAID

## 2023-06-09 ENCOUNTER — PATIENT MESSAGE (OUTPATIENT)
Dept: FAMILY MEDICINE CLINIC | Facility: CLINIC | Age: 43
End: 2023-06-09

## 2023-06-09 NOTE — TELEPHONE ENCOUNTER
From: Yessenia Reeder  To: Beto Rodriguez DO  Sent: 6/9/2023 3:00 AM CDT  Subject: Question regarding CT APPENDIX W CONTRAST (CPT=74177)    Can you help me with this?

## 2023-06-12 ENCOUNTER — TELEPHONE (OUTPATIENT)
Dept: PHYSICAL THERAPY | Facility: HOSPITAL | Age: 43
End: 2023-06-12

## 2023-06-13 ENCOUNTER — APPOINTMENT (OUTPATIENT)
Dept: PHYSICAL THERAPY | Age: 43
End: 2023-06-13
Attending: ORTHOPAEDIC SURGERY
Payer: MEDICAID

## 2023-06-19 ENCOUNTER — VIRTUAL PHONE E/M (OUTPATIENT)
Dept: FAMILY MEDICINE CLINIC | Facility: CLINIC | Age: 43
End: 2023-06-19

## 2023-06-19 ENCOUNTER — PATIENT MESSAGE (OUTPATIENT)
Dept: FAMILY MEDICINE CLINIC | Facility: CLINIC | Age: 43
End: 2023-06-19

## 2023-06-19 ENCOUNTER — OFFICE VISIT (OUTPATIENT)
Dept: PHYSICAL THERAPY | Age: 43
End: 2023-06-19
Attending: ORTHOPAEDIC SURGERY
Payer: MEDICAID

## 2023-06-19 DIAGNOSIS — R42 DIZZINESS: ICD-10-CM

## 2023-06-19 DIAGNOSIS — R06.09 DYSPNEA ON EXERTION: Primary | ICD-10-CM

## 2023-06-19 DIAGNOSIS — F41.9 ANXIETY: ICD-10-CM

## 2023-06-19 DIAGNOSIS — J98.11 BILATERAL ATELECTASIS: ICD-10-CM

## 2023-06-19 PROCEDURE — 97110 THERAPEUTIC EXERCISES: CPT

## 2023-06-19 PROCEDURE — 97140 MANUAL THERAPY 1/> REGIONS: CPT

## 2023-06-19 NOTE — TELEPHONE ENCOUNTER
From: Blane Ceja  To:  Lisa Mckeon DO  Sent: 6/19/2023 3:21 PM CDT  Subject: Today's phone call appointment    I am still waiting for the phone call appointment

## 2023-06-20 ENCOUNTER — APPOINTMENT (OUTPATIENT)
Dept: PHYSICAL THERAPY | Age: 43
End: 2023-06-20
Attending: ORTHOPAEDIC SURGERY
Payer: MEDICAID

## 2023-06-20 ENCOUNTER — TELEPHONE (OUTPATIENT)
Dept: SURGERY | Facility: CLINIC | Age: 43
End: 2023-06-20

## 2023-06-20 NOTE — TELEPHONE ENCOUNTER
-S/w pt; identity verified with name & .  -Pt scheduled today (23) 3pm for office Cysto w/ AR. -AR requested pt called to come in @ 1pm. Pt unable to come today d/t \"babysitting my grandchild. \"  -Pt requested 23; agreeable to 9am procedure.  -Encounter complete.

## 2023-06-21 ENCOUNTER — HOSPITAL ENCOUNTER (OUTPATIENT)
Dept: MRI IMAGING | Age: 43
Discharge: HOME | End: 2023-06-21
Attending: ORTHOPAEDIC SURGERY
Payer: MEDICAID

## 2023-06-21 DIAGNOSIS — M77.11 LATERAL EPICONDYLITIS OF RIGHT ELBOW: ICD-10-CM

## 2023-06-21 PROCEDURE — 73221 MRI JOINT UPR EXTREM W/O DYE: CPT | Performed by: ORTHOPAEDIC SURGERY

## 2023-06-23 ENCOUNTER — APPOINTMENT (OUTPATIENT)
Dept: PHYSICAL THERAPY | Age: 43
End: 2023-06-23
Attending: ORTHOPAEDIC SURGERY
Payer: MEDICAID

## 2023-06-26 ENCOUNTER — PROCEDURE (OUTPATIENT)
Dept: SURGERY | Facility: CLINIC | Age: 43
End: 2023-06-26

## 2023-06-26 VITALS — SYSTOLIC BLOOD PRESSURE: 135 MMHG | HEART RATE: 91 BPM | DIASTOLIC BLOOD PRESSURE: 95 MMHG

## 2023-06-26 DIAGNOSIS — R31.0 GROSS HEMATURIA: Primary | ICD-10-CM

## 2023-06-26 PROCEDURE — 52000 CYSTOURETHROSCOPY: CPT | Performed by: UROLOGY

## 2023-06-26 PROCEDURE — 3075F SYST BP GE 130 - 139MM HG: CPT | Performed by: UROLOGY

## 2023-06-26 PROCEDURE — 3080F DIAST BP >= 90 MM HG: CPT | Performed by: UROLOGY

## 2023-06-26 NOTE — PROCEDURES
CYSTOSCOPY (FEMALE)    PRE-OP DIAGNOSIS: gross hematuria    POST-OP DIAGNOSIS: Same    PROCEDURE: Cystsocopy    SURGEON: Chris Sargent MD    ASSISTANT: none     EBL: minimal    FINDINGS:   Urethra: No urethral lesions, no urethral strictures  Bladder: Bilateral ureteral orifices in orthotopic position with efflux, left ureterocele, no suspicious or concerning erythematous lesions, no papillary bladder tumors, no stones   Retroflexion: no abnormalities   Other findings: None    INDICATIONS: Gross hematuria with cytology negative, CT urogram with no genitourinary abnormalities. She had a small hiatal hernia. PROCEDURE: Patient was brought to the procedure suite and a timeout was performed identifying the patient,  and procedure being performed. The risks of the procedure were once again detailed to the patient including bleeding, infection, dysuria. The patient agreed to proceed. The patient had a negative urinalysis. No abnormalities no antibiotics were given to patient prior to this procedure. She was placed in a supine position on the table and a flexible cystoscope was inserted per urethra. There were no obvious urethral lesions or strictures. Once in the bladder we performed a full diagnostic/surveillance cystoscopy which demonstrated no abnormalities other than a left ureterocele  On retroflexion we noted no abnormalities. The scope was then carefully removed and once again no urethral abnormalities were noted. There were no complications after this procedure and the patient tolerated the procedure without issue. IMPRESSION: Cystoscopy negative    Patient with urgency and frequency - offered mirabegron, she wants to return to clinic to discuss behaviors.     PLAN:    Return to clinic to discuss urgency/frequency options

## 2023-06-28 ENCOUNTER — TELEPHONE (OUTPATIENT)
Dept: RHEUMATOLOGY | Facility: CLINIC | Age: 43
End: 2023-06-28

## 2023-07-17 ENCOUNTER — OFFICE VISIT (OUTPATIENT)
Dept: RHEUMATOLOGY | Facility: CLINIC | Age: 43
End: 2023-07-17

## 2023-07-17 ENCOUNTER — LAB ENCOUNTER (OUTPATIENT)
Dept: LAB | Facility: HOSPITAL | Age: 43
End: 2023-07-17
Attending: INTERNAL MEDICINE
Payer: MEDICAID

## 2023-07-17 ENCOUNTER — HOSPITAL ENCOUNTER (OUTPATIENT)
Dept: GENERAL RADIOLOGY | Facility: HOSPITAL | Age: 43
Discharge: HOME | End: 2023-07-17
Attending: INTERNAL MEDICINE
Payer: MEDICAID

## 2023-07-17 VITALS
WEIGHT: 152 LBS | DIASTOLIC BLOOD PRESSURE: 64 MMHG | HEIGHT: 64 IN | SYSTOLIC BLOOD PRESSURE: 131 MMHG | HEART RATE: 88 BPM | BODY MASS INDEX: 25.95 KG/M2

## 2023-07-17 DIAGNOSIS — M77.11 LATERAL EPICONDYLITIS OF BOTH ELBOWS: ICD-10-CM

## 2023-07-17 DIAGNOSIS — M77.12 LATERAL EPICONDYLITIS OF BOTH ELBOWS: ICD-10-CM

## 2023-07-17 DIAGNOSIS — M54.2 NECK PAIN: ICD-10-CM

## 2023-07-17 DIAGNOSIS — M54.2 NECK PAIN: Primary | ICD-10-CM

## 2023-07-17 DIAGNOSIS — M79.18 MYOFASCIAL PAIN SYNDROME: ICD-10-CM

## 2023-07-17 PROCEDURE — 36415 COLL VENOUS BLD VENIPUNCTURE: CPT

## 2023-07-17 PROCEDURE — 3008F BODY MASS INDEX DOCD: CPT | Performed by: INTERNAL MEDICINE

## 2023-07-17 PROCEDURE — 81374 HLA I TYPING 1 ANTIGEN LR: CPT

## 2023-07-17 PROCEDURE — 72050 X-RAY EXAM NECK SPINE 4/5VWS: CPT | Performed by: INTERNAL MEDICINE

## 2023-07-17 PROCEDURE — 3075F SYST BP GE 130 - 139MM HG: CPT | Performed by: INTERNAL MEDICINE

## 2023-07-17 PROCEDURE — 99244 OFF/OP CNSLTJ NEW/EST MOD 40: CPT | Performed by: INTERNAL MEDICINE

## 2023-07-17 PROCEDURE — 3078F DIAST BP <80 MM HG: CPT | Performed by: INTERNAL MEDICINE

## 2023-07-17 NOTE — PATIENT INSTRUCTIONS
You were seen today for joint pain involving her elbows, wrist  Also having a lot of neck and lower back pain  Blood work right now is negative for lupus or rheumatoid arthritis  We will plan on getting some more blood work to make sure that any other autoimmune disease  Also x-ray of the neck  I would recommend to also see Dr. Amberly Gutierres for a lot of the neck and lower back pain you are having

## 2023-07-24 LAB — HLA-B27: NEGATIVE

## 2023-08-07 ENCOUNTER — OFFICE VISIT (OUTPATIENT)
Dept: ORTHOPEDICS CLINIC | Facility: CLINIC | Age: 43
End: 2023-08-07

## 2023-08-07 VITALS — WEIGHT: 148 LBS | HEIGHT: 64 IN | BODY MASS INDEX: 25.27 KG/M2

## 2023-08-07 DIAGNOSIS — M77.11 LATERAL EPICONDYLITIS OF RIGHT ELBOW: Primary | ICD-10-CM

## 2023-08-11 ENCOUNTER — TELEPHONE (OUTPATIENT)
Dept: SURGERY | Facility: CLINIC | Age: 43
End: 2023-08-11

## 2023-08-11 NOTE — TELEPHONE ENCOUNTER
Kimberly Berg called saying that pt wants to know if she can change her appt to a virtual one because she has a high fever. She also informed that pt has reschd this same appt 3 times already. I told her that she can tell the pt she cannot have a virtual appt and pt can reschd.

## 2023-08-21 ENCOUNTER — OFFICE VISIT (OUTPATIENT)
Dept: FAMILY MEDICINE CLINIC | Facility: CLINIC | Age: 43
End: 2023-08-21

## 2023-08-21 VITALS
SYSTOLIC BLOOD PRESSURE: 128 MMHG | HEIGHT: 64 IN | TEMPERATURE: 97 F | WEIGHT: 150 LBS | HEART RATE: 89 BPM | DIASTOLIC BLOOD PRESSURE: 85 MMHG | BODY MASS INDEX: 25.61 KG/M2

## 2023-08-21 DIAGNOSIS — G56.03 BILATERAL CARPAL TUNNEL SYNDROME: ICD-10-CM

## 2023-08-21 DIAGNOSIS — M54.50 CHRONIC BILATERAL LOW BACK PAIN WITHOUT SCIATICA: ICD-10-CM

## 2023-08-21 DIAGNOSIS — H00.011 HORDEOLUM EXTERNUM OF RIGHT UPPER EYELID: Primary | ICD-10-CM

## 2023-08-21 DIAGNOSIS — M25.522 CHRONIC ELBOW PAIN, LEFT: ICD-10-CM

## 2023-08-21 DIAGNOSIS — G56.22 NEURITIS OF LEFT ULNAR NERVE: ICD-10-CM

## 2023-08-21 DIAGNOSIS — G89.29 CHRONIC ELBOW PAIN, LEFT: ICD-10-CM

## 2023-08-21 DIAGNOSIS — M79.641 RIGHT HAND PAIN: ICD-10-CM

## 2023-08-21 DIAGNOSIS — M79.644 PAIN OF RIGHT THUMB: ICD-10-CM

## 2023-08-21 DIAGNOSIS — G89.29 CHRONIC BILATERAL LOW BACK PAIN WITHOUT SCIATICA: ICD-10-CM

## 2023-08-21 PROCEDURE — 3074F SYST BP LT 130 MM HG: CPT | Performed by: FAMILY MEDICINE

## 2023-08-21 PROCEDURE — 3079F DIAST BP 80-89 MM HG: CPT | Performed by: FAMILY MEDICINE

## 2023-08-21 PROCEDURE — 99214 OFFICE O/P EST MOD 30 MIN: CPT | Performed by: FAMILY MEDICINE

## 2023-08-21 PROCEDURE — 3008F BODY MASS INDEX DOCD: CPT | Performed by: FAMILY MEDICINE

## 2023-08-21 RX ORDER — GENTAMICIN SULFATE 3 MG/ML
1 SOLUTION/ DROPS OPHTHALMIC 4 TIMES DAILY
Qty: 1 EACH | Refills: 0 | Status: SHIPPED | OUTPATIENT
Start: 2023-08-21 | End: 2023-08-26

## 2023-08-21 RX ORDER — PREGABALIN 150 MG/1
150 CAPSULE ORAL 2 TIMES DAILY
Qty: 60 CAPSULE | Refills: 1 | Status: SHIPPED | OUTPATIENT
Start: 2023-08-21

## 2023-08-21 RX ORDER — TRAMADOL HYDROCHLORIDE 50 MG/1
50 TABLET ORAL EVERY 6 HOURS PRN
Qty: 20 TABLET | Refills: 0 | Status: SHIPPED | OUTPATIENT
Start: 2023-08-21

## 2023-08-21 NOTE — PATIENT INSTRUCTIONS
Call [phone] and ask for the physicians at the [facility] office that do medical marijuana.

## 2023-09-06 NOTE — OPERATIVE REPORT
[facility]    PATIENT'S NAME: DE LA Caryle Modest   ATTENDING PHYSICIAN: Ruddy Ryan. Chino Lubin MD   OPERATING PHYSICIAN: Trevor Lubin MD   PATIENT ACCOUNT#:   [id number]    LOCATION:  [facility] [address] PACU 2 [facility] 10  MEDICAL RECORD #:   [id number]       YOB: 1980  ADMISSION DATE:       02/09/2023      OPERATION DATE:  02/09/2023    OPERATIVE REPORT      PREOPERATIVE DIAGNOSIS:  Left cubital tunnel syndrome with ulnar neuritis. POSTOPERATIVE DIAGNOSIS:  Left cubital tunnel syndrome with ulnar neuritis. PROCEDURE:  Left elbow ulnar nerve transposition. ASSISTANT:  Candy Vogel PA-C    ANESTHESIA:  Dr. Maryse Mercedes with general laryngeal mask anesthesia. INDICATIONS:  Patient is a 64-year-old woman with the above diagnosis documented by physical exam and ultrasound. She has failed nonoperative treatment. The risks and complications of surgery were discussed and no guarantees were given. The consent was signed. OPERATIVE TECHNIQUE:  Patient brought to the operating room, placed in supine position. Appropriate IV access and monitors were placed. Ancef 2 g IV was given as prophylaxis. General laryngeal mask anesthesia was performed by Dr. Maryse Mercedes. SCD boots were on both legs. A tourniquet was placed on the upper left arm and the left upper extremity was then prepped and draped in the usual sterile fashion with Betadine followed by ChloraPrep. The arm was exsanguinated and tourniquet inflated to 250 mmHg. Tourniquet time for the case was 69 minutes. Incision was made over the cubital tunnel proximally and distally. Blunt dissection took place through the subcutaneous fat to preserve the dorsal antebrachial cutaneous nerve. The medial epicondyle was identified. Exposure of the nerve began just proximal with blunt dissection using tenotomy scissors. This went along the intermuscular septum exposing the ulnar nerve proximally.   Under careful dissection and direct visualization, the arcade of Frohse was incised. The cubital tunnel was then opened. As this went distally, some of the flexor tendon was incised exposing the muscle. Blunt dissection took place through the muscle. The 2 small branches off the ulnar nerve just distal to the medial epicondyle were able to be preserved and were dissected as well to allow better anterior transposition. The nerve was dissected and freed of its fascial bindings distally until the nerve could easily be anteriorly transposed. The fascia was then incised on the ventral surface just lateral to the medial epicondyle. This allowed the nerves to sit in the soft tissue of muscle. The sling was then fashioned using a scalpel and this was sewn with #2 Vicryl suture in figure-of-eight interrupted sutures. The Mosquito hemostat could be passed through the sling and there was no pressure on the ulnar nerve. This was in full extension. The arm was then taken into full flexion and back to full extension several times and there was no significant pinching of the ulnar nerve. The cubital tunnel itself then had its fascia closed to prevent the nerve from returning to this area as well. The wound was then irrigated with saline. It was then closed in layers with 2-0 Vicryl suture in the subcutaneous and Steri-Strips to the skin. The sterile dressings were applied with bulky cotton. A posterior mold with the arm at about 60 degrees of flexion was placed, and the patient's arm placed in a sling. She was awakened, extubated, brought to the recovery room in stable condition. She was not awake enough at the time of this dictation for neurologic dictation. I did inject 8 mL of Sensorcaine 0.5% without epinephrine to the subcutaneous skin just before Steri-Strips. The tourniquet had been let down just prior to closure and no significant bleeding was noted. Any small punctate bleeding was controlled with bipolar electrocautery.     Blood loss was 10 mL.  No specimens were sent. No drains used. No complications were noted. Patient tolerated procedure well. She will be placed on tramadol and Tylenol for postoperative pain relief. She has an allergy to anti-inflammatories. Postoperative instructions were given in both written and oral form to her and her son Jean Spaulding. She will follow up Dr. Nel Majano in 2 weeks' time in the office. Dictated By Marlena Perrin.  Nel Majano MD  d: 02/09/2023 15:10:01  t: 02/09/2023 15:30:21  Job [id number]  [facility]/    cc: Chel Douglas DO no

## 2023-09-13 DIAGNOSIS — R53.83 LETHARGY: ICD-10-CM

## 2023-09-13 DIAGNOSIS — Z72.821 POOR SLEEP HYGIENE: ICD-10-CM

## 2023-09-13 DIAGNOSIS — F32.A DEPRESSIVE DISORDER: ICD-10-CM

## 2023-09-13 DIAGNOSIS — M79.10 MYALGIA: ICD-10-CM

## 2023-09-13 DIAGNOSIS — M79.7 FIBROMYALGIA: ICD-10-CM

## 2023-09-14 RX ORDER — DULOXETIN HYDROCHLORIDE 30 MG/1
60 CAPSULE, DELAYED RELEASE ORAL DAILY
Qty: 180 CAPSULE | Refills: 0 | Status: SHIPPED | OUTPATIENT
Start: 2023-09-14

## 2023-09-19 ENCOUNTER — TELEPHONE (OUTPATIENT)
Dept: FAMILY MEDICINE CLINIC | Facility: CLINIC | Age: 43
End: 2023-09-19

## 2023-10-30 ENCOUNTER — HOSPITAL ENCOUNTER (OUTPATIENT)
Age: 43
Discharge: HOME | End: 2023-10-30
Attending: EMERGENCY MEDICINE
Payer: MEDICAID

## 2023-10-30 ENCOUNTER — APPOINTMENT (OUTPATIENT)
Dept: CT IMAGING | Age: 43
End: 2023-10-30
Attending: EMERGENCY MEDICINE
Payer: MEDICAID

## 2023-10-30 VITALS
HEART RATE: 81 BPM | TEMPERATURE: 97 F | OXYGEN SATURATION: 100 % | DIASTOLIC BLOOD PRESSURE: 81 MMHG | SYSTOLIC BLOOD PRESSURE: 128 MMHG | RESPIRATION RATE: 20 BRPM

## 2023-10-30 DIAGNOSIS — R10.9 ABDOMINAL PAIN OF UNKNOWN ETIOLOGY: Primary | ICD-10-CM

## 2023-10-30 LAB
#MXD IC: 0.6 X10ˆ3/UL (ref 0.1–1)
B-HCG UR QL: NEGATIVE
BILIRUB UR QL STRIP: NEGATIVE
BUN BLD-MCNC: 17 MG/DL (ref 7–18)
CHLORIDE BLD-SCNC: 103 MMOL/L (ref 98–112)
CLARITY UR: CLEAR
CO2 BLD-SCNC: 26 MMOL/L (ref 21–32)
COLOR UR: YELLOW
CREAT BLD-MCNC: 0.6 MG/DL
EGFRCR SERPLBLD CKD-EPI 2021: 113 ML/MIN/1.73M2 (ref 60–?)
GLUCOSE BLD-MCNC: 86 MG/DL (ref 70–99)
GLUCOSE UR STRIP-MCNC: NEGATIVE MG/DL
HCT VFR BLD AUTO: 40.7 %
HCT VFR BLD CALC: 44 %
HGB BLD-MCNC: 13.1 G/DL
HGB UR QL STRIP: NEGATIVE
ISTAT IONIZED CALCIUM FOR CHEM 8: 1.24 MMOL/L (ref 1.12–1.32)
KETONES UR STRIP-MCNC: NEGATIVE MG/DL
LEUKOCYTE ESTERASE UR QL STRIP: NEGATIVE
LYMPHOCYTES # BLD AUTO: 3.4 X10ˆ3/UL (ref 1–4)
LYMPHOCYTES NFR BLD AUTO: 45.8 %
MCH RBC QN AUTO: 27.2 PG (ref 26–34)
MCHC RBC AUTO-ENTMCNC: 32.2 G/DL (ref 31–37)
MCV RBC AUTO: 84.6 FL (ref 80–100)
MIXED CELL %: 8 %
NEUTROPHILS # BLD AUTO: 3.5 X10ˆ3/UL (ref 1.5–7.7)
NEUTROPHILS NFR BLD AUTO: 46.2 %
NITRITE UR QL STRIP: NEGATIVE
PH UR STRIP: 5.5 [PH]
PLATELET # BLD AUTO: 354 X10ˆ3/UL (ref 150–450)
POTASSIUM BLD-SCNC: 4.3 MMOL/L (ref 3.6–5.1)
PROT UR STRIP-MCNC: NEGATIVE MG/DL
RBC # BLD AUTO: 4.81 X10ˆ6/UL
SODIUM BLD-SCNC: 139 MMOL/L (ref 136–145)
SP GR UR STRIP: 1.01
UROBILINOGEN UR STRIP-ACNC: <2 MG/DL
WBC # BLD AUTO: 7.5 X10ˆ3/UL (ref 4–11)

## 2023-10-30 PROCEDURE — 85025 COMPLETE CBC W/AUTO DIFF WBC: CPT | Performed by: EMERGENCY MEDICINE

## 2023-10-30 PROCEDURE — 74176 CT ABD & PELVIS W/O CONTRAST: CPT | Performed by: EMERGENCY MEDICINE

## 2023-10-30 PROCEDURE — 80047 BASIC METABLC PNL IONIZED CA: CPT

## 2023-10-30 PROCEDURE — 81025 URINE PREGNANCY TEST: CPT

## 2023-10-30 PROCEDURE — 81002 URINALYSIS NONAUTO W/O SCOPE: CPT

## 2023-10-30 PROCEDURE — 99215 OFFICE O/P EST HI 40 MIN: CPT

## 2023-10-30 PROCEDURE — 99214 OFFICE O/P EST MOD 30 MIN: CPT

## 2023-10-30 PROCEDURE — 36415 COLL VENOUS BLD VENIPUNCTURE: CPT

## 2023-10-30 PROCEDURE — 87086 URINE CULTURE/COLONY COUNT: CPT | Performed by: EMERGENCY MEDICINE

## 2023-10-30 RX ORDER — ACETAMINOPHEN 325 MG/1
650 TABLET ORAL ONCE
Status: COMPLETED | OUTPATIENT
Start: 2023-10-30 | End: 2023-10-30

## 2023-10-30 NOTE — ED INITIAL ASSESSMENT (HPI)
Pt states she developed suprapubic pain that began last night. Pt states pain is intermittent.   + burning/urgency/hematuria. Denies fever, c/o chills.

## 2023-11-03 ENCOUNTER — TELEMEDICINE (OUTPATIENT)
Dept: TELEHEALTH | Age: 43
End: 2023-11-03
Payer: MEDICAID

## 2023-11-03 DIAGNOSIS — M25.521 RIGHT ELBOW PAIN: Primary | ICD-10-CM

## 2023-11-03 DIAGNOSIS — M54.2 NECK PAIN: ICD-10-CM

## 2023-11-03 PROCEDURE — 99213 OFFICE O/P EST LOW 20 MIN: CPT | Performed by: PHYSICIAN ASSISTANT

## 2023-11-03 NOTE — PROGRESS NOTES
Virtual/Telephone Check-In    Santhosh Sharma verbally consents to a Virtual/Telephone Check-In service on 11/03/23. Patient has been referred to the [facility] website at [email or web address] to review the yearly Consent to Treat document. Patient understands and accepts financial responsibility for any deductible, co-insurance and/or co-pays associated with this service. Telehealth Verbal Consent   I conducted a telehealth visit with Santhosh Sharma today, 11/03/23, which was completed using two-way, real-time interactive audio and video communication. Every conscious effort was taken to allow for sufficient and adequate time to complete the visit. The patient was made aware of the limitations of the telehealth visit, including treatment limitations as no physical exam could be performed. The patient was advised to call 911 or to go to the ER in case there was an emergency. The patient was also advised of the potential privacy & security concerns related to the telehealth platform. The patient was made aware of where to find [facility] notice of privacy practices, telehealth consent form and other related consent forms and documents. which are located on the [facility] website. The patient verbally agreed to telehealth consent form, related consents and the risks discussed. Lastly, the patient confirmed that they were in [place]. Included in this visit, time may have been spent reviewing labs, medications, radiology tests and decision making. Appropriate medical decision-making and tests are ordered as detailed in the plan of care above. Coding/billing information is submitted for this visit based on complexity of care and/or time spent for the visit. CHIEF COMPLAINT:   Patient presents with:  Back Pain     HPI:   Santhosh Sharma is a 37year old female who presents for a video visit. Patient reports chronic back/neck pain and elbow pain. She has had surgery on both elbows.  Screw in place on her right elbow. No back surgery. Today after putting on makeup she developed severe pain in her neck/back/ and right elbow. She feels screw is out of place in her elbow. She has chronic numbness in her right thumb but is now experiencing tingling in her 2-3 fingers. She feels a bump on the back of her neck. She is in pain with any movement. She is out of pain medications  she typically takes- Cymbalta and lyrica   Current Outpatient Medications   Medication Sig Dispense Refill    DULoxetine 30 MG Oral Cap DR Particles Take 2 capsules (60 mg total) by mouth daily. 180 capsule 0    pregabalin (LYRICA) 150 MG Oral Cap Take 1 capsule (150 mg total) by mouth 2 (two) times daily. Was already on gabapentin in the past and continues to have pain. She has carpal tunnel pain. 60 capsule 1    traMADol 50 MG Oral Tab Take 1 tablet (50 mg total) by mouth every 6 (six) hours as needed for Pain. No alcohol or driving on this med. Stop if lethargic or hallucinating. 20 tablet 0    meclizine 25 MG Oral Tab Take 1 tablet (25 mg total) by mouth 3 (three) times daily as needed for Dizziness. 20 tablet 0    cyclobenzaprine 5 MG Oral Tab Take 1 tablet (5 mg total) by mouth 3 (three) times daily as needed. 30 tablet 0    acetaminophen 500 MG Oral Tab Take 2 tablets (1,000 mg total) by mouth every 8 (eight) hours as needed for Pain. 40 tablet 0    SUMAtriptan Succinate 100 MG Oral Tab Use at onset; repeat once after 2 HRS-ONLY 2 IN 24 HR MAX. This is a 30 day supply. (Patient taking differently: Take 1 tablet (100 mg total) by mouth every morning. Use at onset; repeat once after 2 HRS-ONLY 2 IN 24 HR MAX. This is a 30 day supply.) 9 tablet 3    ondansetron 4 MG Oral Tablet Dispersible Take 1 tablet (4 mg total) by mouth every 4 (four) hours as needed for Nausea. 10 tablet 0    docusate sodium (DULCOLAX STOOL SOFTENER) 100 MG Oral Cap Take 1 capsule (100 mg total) by mouth 2 (two) times daily as needed for constipation.  30 capsule 0    Magnesium 100 MG Oral Tab Take 1 tablet (100 mg total) by mouth at bedtime. omeprazole 20 MG Oral Capsule Delayed Release Take 1 capsule (20 mg total) by mouth every morning before breakfast. 30 capsule 3    atorvastatin 10 MG Oral Tab Take 1 tablet (10 mg total) by mouth nightly. (Patient not taking: Reported on 10/30/2023) 90 tablet 3      Past Medical History:   Diagnosis Date    Back problem     Carpal tunnel syndrome on both sides     Carpal tunnel syndrome, right 11/25/2019    Chronic headaches     Esophageal reflux     Frequent UTI     History of surgical removal of ganglion cyst 1992, 2001    Hyperlipidemia 2011    Medical management    Lipid screening 02/04/2012    Per [employer]    Migraines     Osteoarthritis     left ankle and toes    Pyelonephritis     x2    Vaginal delivery 02/12/1995, 08/24/1997, 06/28/2003      Past Surgical History:   Procedure Laterality Date    CARPAL TUNNEL RELEASE Left 2019    ELBOW ARTHROSCOP,PART DEBRIDE Right 02/2022    with tendon repair    OTHER Left     removal of left ganglion cyst on left wrist    WRIST ARTHROSCOP,RELEASE XVERS LIG Right 01/24/2020    Right endoscopic carpal tunnel release      Social History     Socioeconomic History    Marital status:     Tobacco Use    Smoking status: Never    Smokeless tobacco: Never   Vaping Use    Vaping Use: Never used   Substance and Sexual Activity    Alcohol use: Never    Drug use: No   Other Topics Concern    Caffeine Concern Yes     Comment: (Coffee, Soda) 2 cups daily    Left Handed No    Right Handed Yes    Currently spends a great deal of time in the sun No    History of tanning No    Hx of Spending [place] of Time in [place] No    Bad sunburns in the past No    Tanning Salons in the Past No    Hx of Radiation Treatments No      REVIEW OF SYSTEMS:   GENERAL: normal appetite   SKIN: no rashes or abnormal skin lesions   LUNGS: denies shortness of breath or wheezing  CARDIOVASCULAR: denies chest pain or palpitations   GI: denies N/V/C or abdominal pain   EXAM:   General: Alert Appears in pain   Respiratory:   Speaking in full sentences comfortably   Normal work of breathing   No cough during visit   Head: Normocephalic   Nose: No obvious nasal discharge. Skin: No obvious rashes or lesions from what observed. No results found for this or any previous visit (from the past 24 hour(s)). ASSESSMENT AND PLAN:   After triage, higher acuity of care was recommended to [facility] today. Rationale: Needs in person exam, possible imaging, medication   Site recommendation: [place] [facility]   Patient/parent verbalized understanding of rationale for further evaluation and was stable upon discharge.   Electronically signed,   Kelly Bettencourt PA-C 11/3/2023, 2:37 PM

## 2023-11-04 NOTE — TELEPHONE ENCOUNTER
Per pt was recently seen at [address] [place] [address] ED due to surgical site being infected, states she needs appt this week. Please advise thank you.
S/w pt and she was seen at ER on 5/13 and dx with suture abscess and started Cephalexin. Pt denies any fever, chills, redness or drainage and states symptoms have improved, but still very painful. She is taking antibiotics as prescribed.  Offered appt today but she declined as she does not have transportation, appt made on 5/19 @ 130pm.
No

## 2023-11-10 ENCOUNTER — OFFICE VISIT (OUTPATIENT)
Dept: FAMILY MEDICINE CLINIC | Facility: CLINIC | Age: 43
End: 2023-11-10
Payer: OTHER MISCELLANEOUS

## 2023-11-10 VITALS
BODY MASS INDEX: 26.63 KG/M2 | DIASTOLIC BLOOD PRESSURE: 77 MMHG | HEIGHT: 64 IN | SYSTOLIC BLOOD PRESSURE: 115 MMHG | WEIGHT: 156 LBS | HEART RATE: 76 BPM

## 2023-11-10 DIAGNOSIS — L20.89 FLEXURAL ATOPIC DERMATITIS: ICD-10-CM

## 2023-11-10 DIAGNOSIS — R20.2 NUMBNESS AND TINGLING IN LEFT HAND: ICD-10-CM

## 2023-11-10 DIAGNOSIS — R20.0 NUMBNESS AND TINGLING IN LEFT HAND: ICD-10-CM

## 2023-11-10 DIAGNOSIS — R20.0 NUMBNESS AND TINGLING IN RIGHT HAND: ICD-10-CM

## 2023-11-10 DIAGNOSIS — G89.29 CHRONIC BILATERAL LOW BACK PAIN WITHOUT SCIATICA: Primary | ICD-10-CM

## 2023-11-10 DIAGNOSIS — M54.2 NECK PAIN: ICD-10-CM

## 2023-11-10 DIAGNOSIS — Z98.890 HISTORY OF CARPAL TUNNEL RELEASE: ICD-10-CM

## 2023-11-10 DIAGNOSIS — M54.50 CHRONIC BILATERAL LOW BACK PAIN WITHOUT SCIATICA: Primary | ICD-10-CM

## 2023-11-10 DIAGNOSIS — R20.2 NUMBNESS AND TINGLING IN RIGHT HAND: ICD-10-CM

## 2023-11-10 RX ORDER — TRIAMCINOLONE ACETONIDE 1 MG/G
OINTMENT TOPICAL
Qty: 80 G | Refills: 1 | Status: SHIPPED | OUTPATIENT
Start: 2023-11-10

## 2023-11-10 RX ORDER — FAMOTIDINE 20 MG/1
20 TABLET, FILM COATED ORAL 2 TIMES DAILY
COMMUNITY
Start: 2023-10-02

## 2023-11-10 NOTE — ASSESSMENT & PLAN NOTE
Continue present management  Has seen physiatry, rheum and ortho  Refer neuro sx  Handicap placard form completed

## 2023-12-16 DIAGNOSIS — G89.29 CHRONIC ELBOW PAIN, LEFT: ICD-10-CM

## 2023-12-16 DIAGNOSIS — E78.2 MIXED HYPERLIPIDEMIA: ICD-10-CM

## 2023-12-16 DIAGNOSIS — Z72.821 POOR SLEEP HYGIENE: ICD-10-CM

## 2023-12-16 DIAGNOSIS — M54.50 CHRONIC BILATERAL LOW BACK PAIN WITHOUT SCIATICA: ICD-10-CM

## 2023-12-16 DIAGNOSIS — F32.A DEPRESSIVE DISORDER: ICD-10-CM

## 2023-12-16 DIAGNOSIS — G56.22 NEURITIS OF LEFT ULNAR NERVE: ICD-10-CM

## 2023-12-16 DIAGNOSIS — R53.83 LETHARGY: ICD-10-CM

## 2023-12-16 DIAGNOSIS — L20.89 FLEXURAL ATOPIC DERMATITIS: ICD-10-CM

## 2023-12-16 DIAGNOSIS — M25.522 CHRONIC ELBOW PAIN, LEFT: ICD-10-CM

## 2023-12-16 DIAGNOSIS — G56.03 BILATERAL CARPAL TUNNEL SYNDROME: ICD-10-CM

## 2023-12-16 DIAGNOSIS — M79.641 RIGHT HAND PAIN: ICD-10-CM

## 2023-12-16 DIAGNOSIS — M79.7 FIBROMYALGIA: ICD-10-CM

## 2023-12-16 DIAGNOSIS — M79.10 MYALGIA: ICD-10-CM

## 2023-12-16 DIAGNOSIS — M79.644 PAIN OF RIGHT THUMB: ICD-10-CM

## 2023-12-16 DIAGNOSIS — G89.29 CHRONIC BILATERAL LOW BACK PAIN WITHOUT SCIATICA: ICD-10-CM

## 2023-12-18 RX ORDER — ATORVASTATIN CALCIUM 10 MG/1
10 TABLET, FILM COATED ORAL NIGHTLY
Qty: 90 TABLET | Refills: 3 | OUTPATIENT
Start: 2023-12-18

## 2023-12-18 RX ORDER — TRIAMCINOLONE ACETONIDE 1 MG/G
OINTMENT TOPICAL
Qty: 80 G | Refills: 1 | OUTPATIENT
Start: 2023-12-18

## 2023-12-18 RX ORDER — PREGABALIN 150 MG/1
150 CAPSULE ORAL 2 TIMES DAILY
Qty: 60 CAPSULE | Refills: 1 | OUTPATIENT
Start: 2023-12-18

## 2023-12-18 NOTE — TELEPHONE ENCOUNTER
Please review. Protocol failed / Has no protocol. Magnesium is patient reported  Famotidine is patient reported    Tramadol last filled 8/21/23 qty 20    Duloxetine passes protocol and due for refill. Please review pended refill request as unable to refill due to high/very high drug interaction warning copied here:    High  Drug-Drug: traMADol and DULoxetineDuloxetine may enhance the adverse/toxic effect of tramadol. The risk for serotonin syndrome/serotonin toxicity and seizures may be increased with this combination. Duloxetine may diminish the therapeutic effect of tramadol. Requested Prescriptions   Pending Prescriptions Disp Refills    Magnesium 100 MG Oral Tab 30 tablet 0     Sig: Take 1 tablet (100 mg total) by mouth at bedtime. There is no refill protocol information for this order       traMADol 50 MG Oral Tab 20 tablet 0     Sig: Take 1 tablet (50 mg total) by mouth every 6 (six) hours as needed for Pain. No alcohol or driving on this med. Stop if lethargic or hallucinating. There is no refill protocol information for this order       DULoxetine 30 MG Oral Cap DR Particles 180 capsule 0     Sig: Take 2 capsules (60 mg total) by mouth daily.        Psychiatric Non-Scheduled (Anti-Anxiety) Passed - 12/16/2023  4:01 PM        Passed - In person appointment or virtual visit in the past 6 mos or appointment in next 3 mos     Recent Outpatient Visits              1 month ago Chronic bilateral low back pain without sciatica    [facility], [facility], XU [place]    Office Visit    1 month ago Right elbow pain    [facility], Virtual Visit Milly Husain PA-C    Telemedicine    3 months ago Hordeolum externum of right upper eyelid    [address], [address], [place] Elly Olszewski, DO    Office Visit    4 months ago Lateral epicondylitis of right elbow    [address], [address] [facility],[address], [address] Lauryn Cobian MD    Office Visit    5 months ago Neck pain    [facility], [address], Ulysses Meier, MD    Office Visit          Future Appointments         Provider Department Appt Notes    In 4 days XU Sanchez [facility], [place], [place] Extreme weakness and pains on right shoulder    In 2 weeks LMB MRI RM1 (1.5T WIDE) [address]     In 2 weeks Lauryn Cobian MD [address], [address] follow up    In 4 weeks [address] [facility] Ultrasound Appt time changed for room maintenance to remove schedule gap. Pt notified via [facility], ar    In 1 month Fuentes Dover MD [address], [address] follow up pre-endoscopy - LMTCB - hasshe seen AL before? [place] [facility] [place] Is this a duplicate appt as she has appt with CB on 9/22/2023 - please change appt to CONSULT. Thank you 5/10/2023 jmb    In 2 months Alfonso Byrne, [address], [address] [place] , [place] [facility], Chronic headaches, facial spasms, eyeball pain. In 3 months Vincenzo Jewell MD [address] [facility], [place] Colonoscopy planning                 famotidine 20 MG Oral Tab  0     Sig: Take 1 tablet (20 mg total) by mouth 2 (two) times daily.        Gastrointestional Medication Protocol Passed - 12/16/2023  4:01 PM        Passed - In person appointment or virtual visit in the past 12 mos or appointment in next 3 mos     Recent Outpatient Visits              1 month ago Chronic bilateral low back pain without sciatica    [facility], [place], [place]    Office Visit    1 month ago Right elbow pain    [facility], Virtual Visit Milly Husain PA-C    Telemedicine    3 months ago Hordeolum externum of right upper eyelid    [address], [address], [address], Dee Payan DO    Office Visit    4 months ago Lateral epicondylitis of right elbow    Mimi Pabon, [address], Sarah Price MD    Office Visit    5 months ago Neck pain    Mimi Pabon, [address], Albaro Willis MD    Office Visit          Future Appointments         Provider Department Appt Notes    In 4 days Jair Solorio, APRN [facility], [address], [place] Extreme weakness and pains on right shoulder    In 2 weeks LMB MRI RM1 (1.5T WIDE) [address]     In 2 weeks MD Mimi Tilley, [address] follow up    In 4 weeks [address] [facility] Ultrasound Appt time changed for room maintenance to remove schedule gap. Pt notified via [facility], ar    In 1 month MD Mimi Arellano, [address] follow up pre-endoscopy - LMTCB - hasshe seen AL before? [place] [facility] [place] Is this a duplicate appt as she has appt with CB on 9/22/2023 - please change appt to CONSULT. Thank you 5/10/2023 jmb    In 2 months Mimi Huang, [place], [place] [facility], Chronic headaches, facial spasms, eyeball pain. In 3 months MD Rebeca López, [place] Colonoscopy planning                  Future Appointments         Provider Department Appt Notes    In 4 days XU Roth [facility], [address], [place] Extreme weakness and pains on right shoulder    In 2 weeks LMB MRI RM1 (1.5T WIDE) [address]     In 2 weeks MD Mimi Tilley, [address] follow up    In 4 weeks [address] [facility] Ultrasound Appt time changed for room maintenance to remove schedule gap.  Pt notified via [facility], ar    In 1 month Mahogany Coles MD [facility] [address], [place] follow up pre-endoscopy - LMTCB - tito seen AL before? Transferring care from [address]? Is this a duplicate appt as she has appt with CB on 9/22/2023 - please change appt to CONSULT. Thank you 5/10/2023 sara    In 2 months [place], [place] [facility], Chronic headaches, facial spasms, eyeball pain.     In 3 months MD Kaz Gracia President, [place] Colonoscopy planning           Recent Outpatient Visits              1 month ago Chronic bilateral low back pain without sciatica    Mata Somers APRN    Office Visit    1 month ago Right elbow pain    [facility], Virtual Visit Tracy Porter PA-C    Telemedicine    3 months ago Hordeolum externum of right upper eyelid    Mata Somers [place], DO    Office Visit    4 months ago Lateral epicondylitis of right elbow    Kaz PresDavid ernandez MD    Office Visit    5 months ago Neck pain    Isael Vega, [address], Ayaan Mckeon MD    Office Visit

## 2023-12-23 RX ORDER — TRAMADOL HYDROCHLORIDE 50 MG/1
50 TABLET ORAL EVERY 6 HOURS PRN
Qty: 20 TABLET | Refills: 0 | OUTPATIENT
Start: 2023-12-23

## 2023-12-23 RX ORDER — DULOXETIN HYDROCHLORIDE 30 MG/1
60 CAPSULE, DELAYED RELEASE ORAL DAILY
Qty: 180 CAPSULE | Refills: 3 | Status: SHIPPED | OUTPATIENT
Start: 2023-12-23

## 2023-12-23 RX ORDER — FAMOTIDINE 20 MG/1
20 TABLET, FILM COATED ORAL 2 TIMES DAILY PRN
Qty: 60 TABLET | Refills: 3 | Status: SHIPPED | OUTPATIENT
Start: 2023-12-23

## 2024-02-01 DIAGNOSIS — L20.89 FLEXURAL ATOPIC DERMATITIS: ICD-10-CM

## 2024-02-02 NOTE — TELEPHONE ENCOUNTER
Please review; protocol failed/ has no protocol    Requested Prescriptions   Pending Prescriptions Disp Refills    triamcinolone 0.1 % External Ointment 80 g 1     Sig: Apply to affected area twice a day       There is no refill protocol information for this order        Recent Outpatient Visits              2 months ago Chronic bilateral low back pain without sciatica    [facility] Do Blood APRN    Office Visit    3 months ago Right elbow pain    [facility], Virtual Visit Marianela Benavides, SHON    Telemedicine    5 months ago Hordeolum externum of right upper eyelid    [facility] Sergio Ford DO    Office Visit    5 months ago Lateral epicondylitis of right elbow    [facility]    Office Visit    6 months ago Neck pain    [facility] Deysi Pond MD    Office Visit          Future Appointments         Provider Department Appt Notes    In 5 days Na Felix MD [facility] - transferring care from     In 3 weeks PF MRI [facility] (1.5T) [facility]     In 1 month Reagan Nolen MD [facility] follow up    In 1 month Liza Bermudez DO [facility] NOTE : apt has to be move from 1:30 to 2:00 bc she was laurie at lunch time due to new [facility] -12/21  Neurology, Chronic headaches, facial spasms, eyeball pain.    In 1 month To [place] MD [facility] Colonoscopy planning

## 2024-02-06 RX ORDER — TRIAMCINOLONE ACETONIDE 1 MG/G
OINTMENT TOPICAL
Qty: 80 G | Refills: 1 | Status: SHIPPED | OUTPATIENT
Start: 2024-02-06

## 2024-06-08 ENCOUNTER — OFFICE VISIT (OUTPATIENT)
Dept: FAMILY MEDICINE CLINIC | Facility: CLINIC | Age: 44
End: 2024-06-08

## 2024-06-08 ENCOUNTER — HOSPITAL ENCOUNTER (OUTPATIENT)
Dept: GENERAL RADIOLOGY | Age: 44
Discharge: HOME | End: 2024-06-08
Attending: FAMILY MEDICINE
Payer: MEDICAID

## 2024-06-08 VITALS
BODY MASS INDEX: 26.63 KG/M2 | TEMPERATURE: 97 F | HEIGHT: 64 IN | WEIGHT: 156 LBS | SYSTOLIC BLOOD PRESSURE: 122 MMHG | DIASTOLIC BLOOD PRESSURE: 85 MMHG | HEART RATE: 85 BPM

## 2024-06-08 DIAGNOSIS — L81.1 MELASMA: ICD-10-CM

## 2024-06-08 DIAGNOSIS — M25.512 ACUTE PAIN OF LEFT SHOULDER: Primary | ICD-10-CM

## 2024-06-08 DIAGNOSIS — M25.612 DECREASED RANGE OF MOTION OF LEFT SHOULDER: ICD-10-CM

## 2024-06-08 DIAGNOSIS — M25.512 ACUTE PAIN OF LEFT SHOULDER: ICD-10-CM

## 2024-06-08 PROCEDURE — 73030 X-RAY EXAM OF SHOULDER: CPT | Performed by: FAMILY MEDICINE

## 2024-06-08 PROCEDURE — 99214 OFFICE O/P EST MOD 30 MIN: CPT | Performed by: FAMILY MEDICINE

## 2024-06-08 NOTE — PROGRESS NOTES
Patient ID: Geeta Macdonald is a 44 year old female.    HPI  Chief Complaint   Patient presents with    Shoulder Pain     Left shoulder ligament tear     Derm Problem     Face-      Last seen by me on 8/21/2023.    Patient visited the ER at [facility] on 5/23/2024 for left shoulder pain with movement that began 5/10/2024; I reviewed the note. I also reviewed the CT Shoulder. Pt states the pain is worse at night time. SHx in left elbow and wasn't able to move her left arm as she was favoring it for some time..  She denies any injury to the left shoulder.    She would like to consult about worsening Melasma which began years ago. Pt has seen a dermatologist about this in the past. She is not using any creams for this; last prescribed in 2019 per me. I provided a referral to a dermatologist.       Wt Readings from Last 6 Encounters:   06/08/24 156 lb   11/10/23 156 lb   08/21/23 150 lb   08/07/23 148 lb   07/17/23 152 lb   06/05/23 150 lb       BMI Readings from Last 6 Encounters:   06/08/24 26.78 kg/m²   11/10/23 26.78 kg/m²   08/21/23 25.75 kg/m²   08/07/23 25.40 kg/m²   07/17/23 26.09 kg/m²   06/05/23 25.75 kg/m²       BP Readings from Last 6 Encounters:   06/08/24 122/85   11/10/23 115/77   10/30/23 128/81   08/21/23 128/85   07/17/23 131/64   06/26/23 (!) 135/95         Review of Systems   Respiratory:  Negative for shortness of breath.    Cardiovascular:  Negative for chest pain.           Medical History:      Past Medical History:    Back problem    Carpal tunnel syndrome on both sides    Carpal tunnel syndrome, right    Chronic headaches    Esophageal reflux    Frequent UTI    History of surgical removal of ganglion cyst    Hyperlipidemia    Medical management    Lipid screening    Per [employer]    Migraines    Osteoarthritis    left ankle and toes    Pyelonephritis    x2    Vaginal delivery (HCC)       Past Surgical History:   Procedure Laterality Date    Carpal tunnel release Left 2019    Elbow  arthroscop,part debride Right 02/2022    with tendon repair    Other Left     removal of left ganglion cyst on left wrist    Wrist arthroscop,release xvers lig Right 01/24/2020    Right endoscopic carpal tunnel release          Current Outpatient Medications   Medication Sig Dispense Refill    triamcinolone 0.1 % External Ointment Apply to affected area twice a day 80 g 1    Magnesium 100 MG Oral Tab Take 1 tablet (100 mg total) by mouth at bedtime. 30 tablet 0    DULoxetine 30 MG Oral Cap DR Particles Take 2 capsules (60 mg total) by mouth daily. 180 capsule 3    famotidine 20 MG Oral Tab Take 1 tablet (20 mg total) by mouth 2 (two) times daily as needed for Heartburn. 60 tablet 3    SUMAtriptan Succinate 100 MG Oral Tab Use at onset; repeat once after 2 HRS-ONLY 2 IN 24 HR MAX.  This is a 30 day supply. 9 tablet 3    pregabalin (LYRICA) 150 MG Oral Cap Take 1 capsule (150 mg total) by mouth 2 (two) times daily. Was already on gabapentin in the past and continues to have pain.  She has carpal tunnel pain. 60 capsule 1    meclizine 25 MG Oral Tab Take 1 tablet (25 mg total) by mouth 3 (three) times daily as needed for Dizziness. 20 tablet 0    cyclobenzaprine 5 MG Oral Tab Take 1 tablet (5 mg total) by mouth 3 (three) times daily as needed. 30 tablet 0    acetaminophen 500 MG Oral Tab Take 2 tablets (1,000 mg total) by mouth every 8 (eight) hours as needed for Pain. 40 tablet 0    ondansetron 4 MG Oral Tablet Dispersible Take 1 tablet (4 mg total) by mouth every 4 (four) hours as needed for Nausea. 10 tablet 0    docusate sodium (DULCOLAX STOOL SOFTENER) 100 MG Oral Cap Take 1 capsule (100 mg total) by mouth 2 (two) times daily as needed for constipation. 30 capsule 0    traMADol 50 MG Oral Tab Take 1 tablet (50 mg total) by mouth every 6 (six) hours as needed for Pain. No alcohol or driving on this med. Stop if lethargic or hallucinating. (Patient not taking: Reported on 6/8/2024) 20 tablet 0    atorvastatin 10 MG  Oral Tab Take 1 tablet (10 mg total) by mouth nightly. (Patient not taking: Reported on 6/8/2024) 90 tablet 3     Allergies:  Allergies   Allergen Reactions    Ibuprofen SWELLING, Tightness in Throat and ANAPHYLAXIS    Nsaids SWELLING    Radiology Contrast Iodinated Dyes HIVES and ITCHING        Physical Exam:       Physical Exam  Blood pressure 122/85, pulse 85, temperature 97 °F (36.1 °C), temperature source Temporal, height 5' 4\" (1.626 m), weight 156 lb, last menstrual period 10/20/2023, not currently breastfeeding.      Physical Exam   Constitutional: Patient is oriented to person, place, and time. Patient appears well-developed and well-nourished. No distress.   Head: Normocephalic.   Eyes: Conjunctivae and EOM are normal.   Neck: Normal range of motion. No thyromegaly present.   Lymphadenopathy: Patient has no cervical adenopathy  Neurological: Patient is alert and oriented to person, place, and time.   Skin: Skin is warm. Pt has hyperpigmentation on the zygomatic arches bilaterally.   Psychiatry: Normal mood and affect.    SHOULDER EXAM: LEFT    Range of Motion-Abduction 20 degrees   Forward Flexion 0 degrees   IR Unable to IR   ER 20 degrees   Atrophy None   Bruising None   Strength Unable to do due to pain and decreased range of motion.                   Stability/Laxity WNL   Pain in Impingement Arc Positive   Apprehension Sign negative    Pain        Rotator Cuff resistance Positive       Bicipital Groove none       AC joint Positive       Neurovascular status Intact       Crepitus w/ ROM negative       Hawkin's Impingement Positive    Neer's Impingement Positive     Comments: With passive ROM, she's in severe pain and has decreased ROM with crying due to pain.     Vitals reviewed.           Assessment/Plan:      Diagnoses and all orders for this visit:    Acute pain of left shoulder  -     XR SHOULDER, COMPLETE (MIN 2 VIEWS), LEFT (CPT=73030); Future  I reviewed the hospital notes along with the CT of  the shoulder.  She is currently in a sling.  Organ to go ahead and do an x-ray as that was not done in the emergency room.  The CT cannot really evaluate the soft tissue per radiology.  Further instructions to come      Decreased range of motion of left shoulder  -     XR SHOULDER, COMPLETE (MIN 2 VIEWS), LEFT (CPT=73030); Future    Melasma  -     DERM - INTERNAL        Referrals (if applicable)  Orders Placed This Encounter   Procedures    DERM - INTERNAL     SKIN DOCTOR    Call [phone].    Worsening melasma over the years.     Referral Priority:   Routine     Referral Type:   OFFICE VISIT     Referred to Provider:   Gisele Simon MD     Requested Specialty:   DERMATOLOGY     Number of Visits Requested:   3         Follow up if symptoms persist.  Take medicine (if given) as prescribed.  Approach to treatment discussed and patient/family member understands and agrees to plan.     No follow-ups on file.      Referrals (if applicable)  Orders Placed This Encounter   Procedures    DERM - INTERNAL     SKIN DOCTOR    Call [phone].    Worsening melasma over the years.     Referral Priority:   Routine     Referral Type:   OFFICE VISIT     Referred to Provider:   Gisele Simon MD     Requested Specialty:   DERMATOLOGY     Number of Visits Requested:   3       Follow up if symptoms persist.  Take medicine (if given) as prescribed.  Approach to treatment discussed and patient/family member understands and agrees to plan.     No follow-ups on file.    There are no Patient Instructions on file for this visit.    Aleyda Galindo    6/8/2024    By signing my name below, Aleyda LANTIGUA,  attest that this documentation has been prepared under the direction and in the presence of Sergio Ford DO.   Electronically Signed: Aleyda Galindo, 6/8/2024, 10:34 AM.    ISergio DO,  personally performed the services described in this documentation. All medical record entries made by the scribe were at my direction and in my  presence.  I have reviewed the chart and discharge instructions (if applicable) and agree that the record reflects my personal performance and is accurate and complete.  Sergio Ford DO, 6/8/2024, 1:27 PM

## 2024-06-12 DIAGNOSIS — M25.512 ACUTE PAIN OF LEFT SHOULDER: ICD-10-CM

## 2024-06-12 DIAGNOSIS — M25.612 DECREASED RANGE OF MOTION OF LEFT SHOULDER: Primary | ICD-10-CM

## 2024-06-15 ENCOUNTER — PATIENT MESSAGE (OUTPATIENT)
Dept: FAMILY MEDICINE CLINIC | Facility: CLINIC | Age: 44
End: 2024-06-15

## 2024-06-15 ENCOUNTER — PATIENT MESSAGE (OUTPATIENT)
Dept: PULMONOLOGY | Facility: CLINIC | Age: 44
End: 2024-06-15

## 2024-06-15 ENCOUNTER — PATIENT MESSAGE (OUTPATIENT)
Dept: ORTHOPEDICS CLINIC | Facility: CLINIC | Age: 44
End: 2024-06-15

## 2024-06-15 DIAGNOSIS — M25.612 DECREASED RANGE OF MOTION OF LEFT SHOULDER: ICD-10-CM

## 2024-06-15 DIAGNOSIS — M25.512 ACUTE PAIN OF LEFT SHOULDER: Primary | ICD-10-CM

## 2024-06-16 NOTE — TELEPHONE ENCOUNTER
From: Geeta Macdonald  To: Sergio Ford  Sent: 6/15/2024 1:47 PM CDT  Subject: Request for ultrasound or MRI after x-ray    Even though the x-rays are showing normal results I still on extreme pain and unable to move my shoulder can you please generate an order for an ultrasound or MRI? I need to know if I have a pinched nerve or a tear on my shoulder ligaments

## 2024-06-17 NOTE — TELEPHONE ENCOUNTER
From: Geeta Macdonald  To: Vazquez Murphy  Sent: 6/15/2024 1:49 PM CDT  Subject: How to schedule a consultation    How to schedule a consultation

## 2024-06-17 NOTE — TELEPHONE ENCOUNTER
From: Geeta Macdonald  To: Reagan Nolen  Sent: 6/15/2024 1:49 PM CDT  Subject: Please help    Can you please help me schedule an appointment as soon as possible I'm having extreme difficulty with my left arm

## 2024-06-17 NOTE — TELEPHONE ENCOUNTER
Spoke with patient and she states she was doing hair over mother's day and after that pain in left elbow returned but now is radiating to left shoulder. She denies any injury. She states she went to ER on 5/23 for eval. Seen by pcp on 6/8/24 and had XR. Offered appointment on 7/2/24 at 9:50am with Dr Nolen. Added her to wait list. She had no further concerns.

## 2024-06-18 NOTE — TELEPHONE ENCOUNTER
I sent in the order for the MRI of her left shoulder but she needs to make sure that it gets approved prior to getting the MRI.

## 2024-08-05 ENCOUNTER — HOSPITAL ENCOUNTER (OUTPATIENT)
Dept: MRI IMAGING | Age: 44
Discharge: HOME | End: 2024-08-05
Attending: FAMILY MEDICINE
Payer: MEDICAID

## 2024-08-05 DIAGNOSIS — M25.612 DECREASED RANGE OF MOTION OF LEFT SHOULDER: ICD-10-CM

## 2024-08-05 DIAGNOSIS — M25.512 ACUTE PAIN OF LEFT SHOULDER: ICD-10-CM

## 2024-08-05 PROCEDURE — 73221 MRI JOINT UPR EXTREM W/O DYE: CPT | Performed by: FAMILY MEDICINE

## 2024-11-04 ENCOUNTER — VIRTUAL PHONE E/M (OUTPATIENT)
Dept: FAMILY MEDICINE CLINIC | Facility: CLINIC | Age: 44
End: 2024-11-04

## 2024-11-04 DIAGNOSIS — M54.2 BILATERAL POSTERIOR NECK PAIN: ICD-10-CM

## 2024-11-04 DIAGNOSIS — F41.9 ANXIETY: ICD-10-CM

## 2024-11-04 DIAGNOSIS — K58.2 IRRITABLE BOWEL SYNDROME WITH BOTH CONSTIPATION AND DIARRHEA: ICD-10-CM

## 2024-11-04 DIAGNOSIS — G56.03 BILATERAL CARPAL TUNNEL SYNDROME: ICD-10-CM

## 2024-11-04 DIAGNOSIS — G56.22 NEURITIS OF LEFT ULNAR NERVE: ICD-10-CM

## 2024-11-04 DIAGNOSIS — M79.641 RIGHT HAND PAIN: ICD-10-CM

## 2024-11-04 DIAGNOSIS — G89.29 CHRONIC RIGHT SHOULDER PAIN: Primary | ICD-10-CM

## 2024-11-04 DIAGNOSIS — M54.50 CHRONIC BILATERAL LOW BACK PAIN WITHOUT SCIATICA: ICD-10-CM

## 2024-11-04 DIAGNOSIS — G89.29 CHRONIC BILATERAL LOW BACK PAIN WITHOUT SCIATICA: ICD-10-CM

## 2024-11-04 DIAGNOSIS — M79.644 PAIN OF RIGHT THUMB: ICD-10-CM

## 2024-11-04 DIAGNOSIS — G43.711 INTRACTABLE CHRONIC MIGRAINE WITHOUT AURA AND WITH STATUS MIGRAINOSUS: ICD-10-CM

## 2024-11-04 DIAGNOSIS — G89.29 CHRONIC ELBOW PAIN, LEFT: ICD-10-CM

## 2024-11-04 DIAGNOSIS — M25.522 CHRONIC ELBOW PAIN, LEFT: ICD-10-CM

## 2024-11-04 DIAGNOSIS — M25.511 CHRONIC RIGHT SHOULDER PAIN: Primary | ICD-10-CM

## 2024-11-04 DIAGNOSIS — M79.7 FIBROMYALGIA: ICD-10-CM

## 2024-11-04 RX ORDER — TRAMADOL HYDROCHLORIDE 50 MG/1
50 TABLET ORAL EVERY 6 HOURS PRN
Qty: 20 TABLET | Refills: 0 | Status: SHIPPED | OUTPATIENT
Start: 2024-11-04

## 2024-11-04 RX ORDER — PREGABALIN 150 MG/1
150 CAPSULE ORAL 2 TIMES DAILY
Qty: 60 CAPSULE | Refills: 1 | Status: SHIPPED | OUTPATIENT
Start: 2024-11-04
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
## 2024-11-20 ENCOUNTER — PATIENT MESSAGE (OUTPATIENT)
Dept: FAMILY MEDICINE CLINIC | Facility: CLINIC | Age: 44
End: 2024-11-20

## 2024-11-20 DIAGNOSIS — N83.209 CYST OF OVARY, UNSPECIFIED LATERALITY: Primary | ICD-10-CM

## 2024-11-20 DIAGNOSIS — N93.9 ABNORMAL VAGINAL BLEEDING: ICD-10-CM

## 2024-11-22 ENCOUNTER — HOSPITAL ENCOUNTER (OUTPATIENT)
Dept: GENERAL RADIOLOGY | Age: 44
Discharge: HOME | End: 2024-11-22
Attending: FAMILY MEDICINE
Payer: MEDICAID

## 2024-11-22 ENCOUNTER — LAB ENCOUNTER (OUTPATIENT)
Dept: LAB | Age: 44
End: 2024-11-22
Attending: FAMILY MEDICINE
Payer: MEDICAID

## 2024-11-22 DIAGNOSIS — M25.511 CHRONIC RIGHT SHOULDER PAIN: ICD-10-CM

## 2024-11-22 DIAGNOSIS — G89.29 CHRONIC RIGHT SHOULDER PAIN: ICD-10-CM

## 2024-11-22 PROCEDURE — 73030 X-RAY EXAM OF SHOULDER: CPT | Performed by: FAMILY MEDICINE

## 2024-11-25 NOTE — TELEPHONE ENCOUNTER
MyChart sent for clarification,order pended but need diagnosis code.     No future appointments.    
This needs to go through her gynecologist.  
negative

## 2025-01-03 ENCOUNTER — TELEPHONE (OUTPATIENT)
Dept: ADMINISTRATIVE | Age: 45
End: 2025-01-03

## 2025-01-03 NOTE — TELEPHONE ENCOUNTER
Hello,    Prior authorization for the below test has been denied by your Patient's insurance.  Please read this encounter in its entirety.    Please note:    The clinical rationale provided by payer is based on past medical history, current signs and symptoms including the examination performed, previous imaging reports (including X-Ray, US, CT, etc.), previous care given - including conservative therapies (e.g. medication, therapies), any previous intervention reports (e.g. surgery, injections, etc.), and any lab / pathology results related to this Imaging request.      Denial Rationale:   Your doctor told us that you have shoulder pain. An imaging study was asked for. We  cannot approve this request because:  Imaging requires six weeks of provider directed treatment to be completed. Supported  treatments include (but are not limited to) drugs for swelling or pain, an in office workout  (physical therapy), and/or oral or injected steroids. This must have been completed in  the past three months without improved symptoms. Contact (via office visit, phone,  email, or messaging) must occur after the treatment is completed. This has not been  met because:  The notes sent to us do not show you have completed a full six weeks of this type of  treatment.  Symptoms must be the same or worse after treatment to support imaging.  The notes sent to us do not show any contact with your provider after you completed  your treatment. This contact is needed to plan your future care.  This finding was based on review of [employer] Musculoskeletal [facility] Guidelines  Section(s): Shoulder (MS 19) and 1.0 General Guidelines    -----------------------------------------------------------------------------------------------------------------------------------------------------------------------------------------------------------------------------------------------    Case remains [id number] eligible until end of day __1.9___  .  Be  advised: An appeal will need to be done by your office should you not do the peer to peer.    Case remains first level appeal eligible for the next 60 calendar days, appeal information may be found within denial letter.  [employer] offer letter and denial letter PDF's may be found within the Media tab. Please un-check \"Clinical Info Only\" to view.      Please note!!!! A new case may not be submitted to payer for the same or similar CPT code until __3.8__  or later.        Please advise.  Thank you!      Yaquelin SARGENT  Patient Referral Representative  Prior Authorizations & Referrals  [facility]

## 2025-01-06 ENCOUNTER — PATIENT MESSAGE (OUTPATIENT)
Dept: FAMILY MEDICINE CLINIC | Facility: CLINIC | Age: 45
End: 2025-01-06

## 2025-01-07 ENCOUNTER — OFFICE VISIT (OUTPATIENT)
Dept: ORTHOPEDICS CLINIC | Facility: CLINIC | Age: 45
End: 2025-01-07

## 2025-01-07 VITALS — HEIGHT: 64 IN | BODY MASS INDEX: 26.63 KG/M2 | WEIGHT: 156 LBS

## 2025-01-07 DIAGNOSIS — M75.22 BICEPS TENDINITIS, LEFT: ICD-10-CM

## 2025-01-07 DIAGNOSIS — M67.912 TENDINOPATHY OF LEFT ROTATOR CUFF: Primary | ICD-10-CM

## 2025-01-07 PROCEDURE — 99214 OFFICE O/P EST MOD 30 MIN: CPT | Performed by: ORTHOPAEDIC SURGERY

## 2025-01-07 NOTE — H&P
NURSING INTAKE COMMENTS:   Chief Complaint   Patient presents with    Shoulder Pain     Pt in for left shoulder pain, started about 6 months ago, not able to move shoulder, pain is 8/10 today, can be worse       HPI: This 44 year old right-hand-dominant female presents today with left shoulder complaints for about 6 months without trauma.  I had RICKY Florentino in the room with me.  The patient pointed to global pain more anterior than posterior but she could not pinpoint any 1 area.  She is already had x-rays which are normal and MRI of the left shoulder in August 2024 which was relatively normal.  There was some tendinopathy but no partial or full-thickness rotator cuff tear.  The labrum and biceps appeared relatively normal on the noncontrast film.  Cartilage surfaces were preserved overall.  The radiologist listed a type II acromion and there may have been slight subacromial bursitis.  The acromioclavicular had very mild degenerative changes but nothing unusual and no inferior spurring.  She did have subchondral cysts in the insertion of the supraspinatus but again the tendon itself was fully intact.    She said she has been doing home therapy with the assistance of her sister-in-law who is a therapist.  She has not had formal therapy.  She states that cortisone injections give her a feeling of ringing in the ears and glass in her eyes and therefore she does not want cortisone injections either.    Past Medical History:    Back problem    Carpal tunnel syndrome on both sides    Carpal tunnel syndrome, right    Chronic headaches    Esophageal reflux    Frequent UTI    History of surgical removal of ganglion cyst    Hyperlipidemia    Medical management    Lipid screening    Per [employer]    Migraines    Osteoarthritis    left ankle and toes    Pyelonephritis    x2    Vaginal delivery (HCC)     Past Surgical History:   Procedure Laterality Date    Carpal tunnel release Left 2019    Elbow arthroscop,part debride Right  02/2022    with tendon repair    Other Left     removal of left ganglion cyst on left wrist    Wrist arthroscop,release xvers lig Right 01/24/2020    Right endoscopic carpal tunnel release     Current Outpatient Medications   Medication Sig Dispense Refill    pregabalin (LYRICA) 150 MG Oral Cap Take 1 capsule (150 mg total) by mouth 2 (two) times daily. Was already on gabapentin in the past and continues to have pain.  She has carpal tunnel pain. 60 capsule 1    traMADol 50 MG Oral Tab Take 1 tablet (50 mg total) by mouth every 6 (six) hours as needed for Pain. No alcohol or driving on this med. Stop if lethargic or hallucinating. 20 tablet 0    triamcinolone 0.1 % External Ointment Apply to affected area twice a day 80 g 1    Magnesium 100 MG Oral Tab Take 1 tablet (100 mg total) by mouth at bedtime. 30 tablet 0    DULoxetine 30 MG Oral Cap DR Particles Take 2 capsules (60 mg total) by mouth daily. 180 capsule 3    famotidine 20 MG Oral Tab Take 1 tablet (20 mg total) by mouth 2 (two) times daily as needed for Heartburn. 60 tablet 3    SUMAtriptan Succinate 100 MG Oral Tab Use at onset; repeat once after 2 HRS-ONLY 2 IN 24 HR MAX.  This is a 30 day supply. 9 tablet 3    meclizine 25 MG Oral Tab Take 1 tablet (25 mg total) by mouth 3 (three) times daily as needed for Dizziness. 20 tablet 0    cyclobenzaprine 5 MG Oral Tab Take 1 tablet (5 mg total) by mouth 3 (three) times daily as needed. 30 tablet 0    acetaminophen 500 MG Oral Tab Take 2 tablets (1,000 mg total) by mouth every 8 (eight) hours as needed for Pain. 40 tablet 0    ondansetron 4 MG Oral Tablet Dispersible Take 1 tablet (4 mg total) by mouth every 4 (four) hours as needed for Nausea. 10 tablet 0    docusate sodium (DULCOLAX STOOL SOFTENER) 100 MG Oral Cap Take 1 capsule (100 mg total) by mouth 2 (two) times daily as needed for constipation. 30 capsule 0    atorvastatin 10 MG Oral Tab Take 1 tablet (10 mg total) by mouth nightly. 90 tablet 3      Allergies[1]  Family History   Problem Relation Age of Onset    Lipids Mother     Cancer Brother         Leukemia    Psychiatric Brother         Drug Abuse    Asthma Other         Children    Other (Other) Maternal Grandmother         Coronary artery disease, (cause of death at age 52)    Diabetes Father     No Known Problems Son     No Known Problems Sister     No Known Problems Son     No Known Problems Son     No Known Problems Sister     No Known Problems Sister     No Known Problems Sister     No Known Problems Brother     No Known Problems Brother     No Known Problems Brother     No Known Problems Brother     No Known Problems Brother      No family Hx of DVT/PE    Social History     Occupational History    Not on file   Tobacco Use    Smoking status: Never    Smokeless tobacco: Never   Vaping Use    Vaping status: Never Used   Substance and Sexual Activity    Alcohol use: Never    Drug use: No    Sexual activity: Not on file        Review of Systems:  GENERAL: feels generally well, no significant weight loss or weight gain  SKIN: no ulcerated or worrisome skin lesions  EYES:denies blurred vision or double vision  HEENT: denies new nasal congestion, sinus pain or ST  LUNGS: denies shortness of breath  CARDIOVASCULAR: denies chest pain  GI: no hematemesis, no worsening heartburn, no diarrhea  : no dysuria, no blood in urine, no difficulty urinating, no incontinence  MUSCULOSKELETAL: no other musculoskeletal complaints other than in HPI  NEURO: no numbness or tingling, no weakness or balance disorder  PSYCHE: no depression or anxiety  HEMATOLOGIC: no hx of blood dyscrasia, no Hx DVT/PE  ENDOCRINE: no thyroid or diabetes issues  ALL/ASTHMA: no new hx of severe allergy or asthma    Physical Examination:    Ht 5' 4\" (1.626 m)   Wt 156 lb (70.8 kg)   BMI 26.78 kg/m²   Constitutional: appears well hydrated, alert and responsive, no acute distress noted  Extremities: Contours of the left shoulder were  symmetrically normal to the right.  Musculoskeletal: Markedly limited motion of the left compared to the right.  The right went to 170 degrees with the left went to 85 degrees in flexion.  Internal rotation brought the left thumb to the left greater trochanter while the right went to the thoracolumbar junction.  External rotations were symmetric at 50 degrees.  Testing of the rotator cuff, biceps, and acromioclavicular joints was difficult as she would splint with almost every maneuver and testing.  There was no crepitus.  Neurological: I tested motor strength distally to both shoulders and she was diffusely weak without focal deficits to every manual muscle test I did.    Imaging: MRI review and x-ray review of the left shoulder as in HPI.      No results found.     Lab Results   Component Value Date    WBC 4.7 04/21/2023    HGB 13.2 04/21/2023    .0 04/21/2023      Lab Results   Component Value Date    GLU 95 04/21/2023    BUN 7 04/21/2023    CREATSERUM 0.67 04/21/2023    GFRNAA 111 07/08/2022    GFRAA 128 07/08/2022        Assessment and Plan:  Diagnoses and all orders for this visit:    Tendinopathy of left rotator cuff  -     PHYSICAL THERAPY - INTERNAL    Biceps tendinitis, left  -     PHYSICAL THERAPY - INTERNAL        Assessment: Above diagnoses.  MRI findings are very mild.  Physical exam findings do not correlate as these are quite severe.    Plan: Given her subjective complaints about cortisone injections, we both agreed not to proceed with that at the present time.  I did strongly recommend a formal course of physical therapy and she agreed.  She can do the home x-rays program with her sister-in-law as well.  I told her I am not anticipating surgery.  For now I will see her as needed for the left shoulder.      Follow Up: No follow-ups on file.    Reagan Nolen MD         [1]   Allergies  Allergen Reactions    Ibuprofen SWELLING, Tightness in Throat and ANAPHYLAXIS    Nsaids SWELLING     Radiology Contrast Iodinated Dyes HIVES and ITCHING

## 2025-01-12 NOTE — TELEPHONE ENCOUNTER
Message routed to Managed care for assistance on submitting xrays to insurance.   
Please advise   
See patient's message.  She called insurance company herself and they stated if we send in the x-ray report of her right shoulder so they know that she did do this already, they will approve her right shoulder MRI.  
Patient

## 2025-01-16 ENCOUNTER — TELEPHONE (OUTPATIENT)
Dept: FAMILY MEDICINE CLINIC | Facility: CLINIC | Age: 45
End: 2025-01-16

## 2025-01-16 NOTE — TELEPHONE ENCOUNTER
Pt dropped off social security disability forms in [facility] office. After reviewing forms, pt would need to go to medical records office at [facility] to obtain all medical records needed to turn in to social security administration. Original forms were mailed out per pt's request.

## 2025-02-06 ENCOUNTER — OFFICE VISIT (OUTPATIENT)
Dept: FAMILY MEDICINE CLINIC | Facility: CLINIC | Age: 45
End: 2025-02-06

## 2025-02-06 ENCOUNTER — LAB ENCOUNTER (OUTPATIENT)
Dept: LAB | Age: 45
End: 2025-02-06
Attending: FAMILY MEDICINE
Payer: MEDICAID

## 2025-02-06 VITALS
TEMPERATURE: 97 F | HEIGHT: 64 IN | DIASTOLIC BLOOD PRESSURE: 82 MMHG | HEART RATE: 98 BPM | WEIGHT: 154 LBS | BODY MASS INDEX: 26.29 KG/M2 | SYSTOLIC BLOOD PRESSURE: 120 MMHG

## 2025-02-06 DIAGNOSIS — H53.149 PHOTOPHOBIA: ICD-10-CM

## 2025-02-06 DIAGNOSIS — J02.9 ACUTE PHARYNGITIS, UNSPECIFIED ETIOLOGY: ICD-10-CM

## 2025-02-06 DIAGNOSIS — Z12.4 CERVICAL CANCER SCREENING: ICD-10-CM

## 2025-02-06 DIAGNOSIS — G43.711 INTRACTABLE CHRONIC MIGRAINE WITHOUT AURA AND WITH STATUS MIGRAINOSUS: Primary | ICD-10-CM

## 2025-02-06 DIAGNOSIS — F40.298 PHONOPHOBIA: ICD-10-CM

## 2025-02-06 DIAGNOSIS — G43.711 INTRACTABLE CHRONIC MIGRAINE WITHOUT AURA AND WITH STATUS MIGRAINOSUS: ICD-10-CM

## 2025-02-06 DIAGNOSIS — N91.2 AMENORRHEA: ICD-10-CM

## 2025-02-06 LAB
ALBUMIN SERPL-MCNC: 4.8 G/DL (ref 3.2–4.8)
ALBUMIN/GLOB SERPL: 1.6 {RATIO} (ref 1–2)
ALP LIVER SERPL-CCNC: 118 U/L
ALT SERPL-CCNC: 21 U/L
ANION GAP SERPL CALC-SCNC: 9 MMOL/L (ref 0–18)
AST SERPL-CCNC: 17 U/L (ref ?–34)
BASOPHILS # BLD AUTO: 0.05 X10(3) UL (ref 0–0.2)
BASOPHILS NFR BLD AUTO: 0.6 %
BILIRUB SERPL-MCNC: 0.4 MG/DL (ref 0.3–1.2)
BUN BLD-MCNC: 11 MG/DL (ref 9–23)
BUN/CREAT SERPL: 14.5 (ref 10–20)
CALCIUM BLD-MCNC: 9.4 MG/DL (ref 8.7–10.4)
CHLORIDE SERPL-SCNC: 104 MMOL/L (ref 98–112)
CO2 SERPL-SCNC: 26 MMOL/L (ref 21–32)
CREAT BLD-MCNC: 0.76 MG/DL
CRP SERPL-MCNC: 1.1 MG/DL (ref ?–1)
DEPRECATED RDW RBC AUTO: 40.3 FL (ref 35.1–46.3)
EGFRCR SERPLBLD CKD-EPI 2021: 98 ML/MIN/1.73M2 (ref 60–?)
EOSINOPHIL # BLD AUTO: 0.17 X10(3) UL (ref 0–0.7)
EOSINOPHIL NFR BLD AUTO: 2.1 %
ERYTHROCYTE [DISTWIDTH] IN BLOOD BY AUTOMATED COUNT: 13.2 % (ref 11–15)
ERYTHROCYTE [SEDIMENTATION RATE] IN BLOOD: 22 MM/HR
FASTING STATUS PATIENT QL REPORTED: YES
GLOBULIN PLAS-MCNC: 3 G/DL (ref 2–3.5)
GLUCOSE BLD-MCNC: 102 MG/DL (ref 70–99)
HCT VFR BLD AUTO: 40.1 %
HGB BLD-MCNC: 13.4 G/DL
IMM GRANULOCYTES # BLD AUTO: 0.01 X10(3) UL (ref 0–1)
IMM GRANULOCYTES NFR BLD: 0.1 %
LYMPHOCYTES # BLD AUTO: 2.65 X10(3) UL (ref 1–4)
LYMPHOCYTES NFR BLD AUTO: 33 %
MCH RBC QN AUTO: 28.5 PG (ref 26–34)
MCHC RBC AUTO-ENTMCNC: 33.4 G/DL (ref 31–37)
MCV RBC AUTO: 85.3 FL
MONOCYTES # BLD AUTO: 0.52 X10(3) UL (ref 0.1–1)
MONOCYTES NFR BLD AUTO: 6.5 %
NEUTROPHILS # BLD AUTO: 4.64 X10 (3) UL (ref 1.5–7.7)
NEUTROPHILS # BLD AUTO: 4.64 X10(3) UL (ref 1.5–7.7)
NEUTROPHILS NFR BLD AUTO: 57.7 %
OSMOLALITY SERPL CALC.SUM OF ELEC: 288 MOSM/KG (ref 275–295)
PLATELET # BLD AUTO: 374 10(3)UL (ref 150–450)
POTASSIUM SERPL-SCNC: 3.5 MMOL/L (ref 3.5–5.1)
PROT SERPL-MCNC: 7.8 G/DL (ref 5.7–8.2)
RBC # BLD AUTO: 4.7 X10(6)UL
SODIUM SERPL-SCNC: 139 MMOL/L (ref 136–145)
TSI SER-ACNC: 1.18 UIU/ML (ref 0.55–4.78)
WBC # BLD AUTO: 8 X10(3) UL (ref 4–11)

## 2025-02-06 PROCEDURE — 85652 RBC SED RATE AUTOMATED: CPT

## 2025-02-06 PROCEDURE — 36415 COLL VENOUS BLD VENIPUNCTURE: CPT

## 2025-02-06 PROCEDURE — 80053 COMPREHEN METABOLIC PANEL: CPT

## 2025-02-06 PROCEDURE — 85025 COMPLETE CBC W/AUTO DIFF WBC: CPT

## 2025-02-06 PROCEDURE — 84443 ASSAY THYROID STIM HORMONE: CPT

## 2025-02-06 PROCEDURE — 86140 C-REACTIVE PROTEIN: CPT

## 2025-02-06 PROCEDURE — 99215 OFFICE O/P EST HI 40 MIN: CPT | Performed by: FAMILY MEDICINE

## 2025-02-06 RX ORDER — MONTELUKAST SODIUM 10 MG/1
10 TABLET ORAL NIGHTLY
Qty: 90 TABLET | Refills: 1 | Status: SHIPPED | OUTPATIENT
Start: 2025-02-06 | End: 2025-08-05

## 2025-02-06 NOTE — PROGRESS NOTES
Patient ID: Geeta Macdonald is a 44 year old female.    Headache   Associated symptoms include a sore throat.     Chief Complaint   Patient presents with    Complete Form    Headache     Severe headache, sensitivity to noise, vision changes- requesting imaging of the brain      Last seen on 06/08/2024.    Pt c/o chronic whole head headache. I reviewed MRI brain from April, 2023. She has not recently consulted with neurology; I provided a referral. She would like blood tests as she is having the same symptoms as when she had inflammation of the brain and feels there is inflammation all through her body. Positive phonophobia and photophobia with the headache. I discussed with her .    Pt also c/o mild intermittent sore throat for the last month. She had been trying to take herbal medications with no relief. Positive post nasal drip. I discussed with her.  No fevers or chills.    She also states she hasn't had her menses in the last four months, only some spotting. Pt denies a chance of pregnancy. She has not recently consulted with gynecology; I provided a referral.     Pt had a Medical Evaluation form filled out by the [employer] department in Dec, 2023.  She also has appointment with Social Security to apply for disability.     She printed out 4 pages worth of diagnoses and I signed that they are accurate diagnoses for permanent and nonreversible disabilities.  Going through these diagnoses they will discuss if surgery happened, what types of tests were done such as x-ray, ultrasound or MRI.  Will complications happened and then how this impacted her work.  She has this written as permanent and nonreversible disabilities.  She listed right wrist, right elbow, left wrist, left elbow, left shoulder, right shoulder, cervical spine, lumbar spine, right toe, brain, hepatic and gastrointestinal system, lungs.  I stated that I agree with these diagnoses and testing done and how it impacts her ability to work.    Wt Readings  from Last 6 Encounters:   02/06/25 154 lb   01/07/25 156 lb   06/08/24 156 lb   11/10/23 156 lb   08/21/23 150 lb   08/07/23 148 lb       BMI Readings from Last 6 Encounters:   02/06/25 26.43 kg/m²   01/07/25 26.78 kg/m²   06/08/24 26.78 kg/m²   11/10/23 26.78 kg/m²   08/21/23 25.75 kg/m²   08/07/23 25.40 kg/m²       BP Readings from Last 6 Encounters:   02/06/25 120/82   06/08/24 122/85   11/10/23 115/77   10/30/23 128/81   08/21/23 128/85   07/17/23 131/64         Review of Systems   HENT:  Positive for postnasal drip and sore throat.    Respiratory:  Negative for shortness of breath.    Cardiovascular:  Negative for chest pain.   Neurological:  Positive for headaches.           Medical History:      Past Medical History:    Back problem    Carpal tunnel syndrome on both sides    Carpal tunnel syndrome, right    Chronic headaches    Esophageal reflux    Frequent UTI    History of surgical removal of ganglion cyst    Hyperlipidemia    Medical management    Lipid screening    Per [employer]    Migraines    Osteoarthritis    left ankle and toes    Pyelonephritis    x2    Vaginal delivery (HCC)       Past Surgical History:   Procedure Laterality Date    Carpal tunnel release Left 2019    Elbow arthroscop,part debride Right 02/2022    with tendon repair    Other Left     removal of left ganglion cyst on left wrist    Wrist arthroscop,release xvers lig Right 01/24/2020    Right endoscopic carpal tunnel release          Current Outpatient Medications   Medication Sig Dispense Refill    pregabalin (LYRICA) 150 MG Oral Cap Take 1 capsule (150 mg total) by mouth 2 (two) times daily. Was already on gabapentin in the past and continues to have pain.  She has carpal tunnel pain. 60 capsule 1    traMADol 50 MG Oral Tab Take 1 tablet (50 mg total) by mouth every 6 (six) hours as needed for Pain. No alcohol or driving on this med. Stop if lethargic or hallucinating. 20 tablet 0    triamcinolone 0.1 % External Ointment Apply to  affected area twice a day 80 g 1    Magnesium 100 MG Oral Tab Take 1 tablet (100 mg total) by mouth at bedtime. 30 tablet 0    DULoxetine 30 MG Oral Cap DR Particles Take 2 capsules (60 mg total) by mouth daily. 180 capsule 3    famotidine 20 MG Oral Tab Take 1 tablet (20 mg total) by mouth 2 (two) times daily as needed for Heartburn. 60 tablet 3    SUMAtriptan Succinate 100 MG Oral Tab Use at onset; repeat once after 2 HRS-ONLY 2 IN 24 HR MAX.  This is a 30 day supply. 9 tablet 3    meclizine 25 MG Oral Tab Take 1 tablet (25 mg total) by mouth 3 (three) times daily as needed for Dizziness. 20 tablet 0    cyclobenzaprine 5 MG Oral Tab Take 1 tablet (5 mg total) by mouth 3 (three) times daily as needed. 30 tablet 0    acetaminophen 500 MG Oral Tab Take 2 tablets (1,000 mg total) by mouth every 8 (eight) hours as needed for Pain. 40 tablet 0    ondansetron 4 MG Oral Tablet Dispersible Take 1 tablet (4 mg total) by mouth every 4 (four) hours as needed for Nausea. 10 tablet 0    docusate sodium (DULCOLAX STOOL SOFTENER) 100 MG Oral Cap Take 1 capsule (100 mg total) by mouth 2 (two) times daily as needed for constipation. 30 capsule 0    atorvastatin 10 MG Oral Tab Take 1 tablet (10 mg total) by mouth nightly. 90 tablet 3     Allergies:Allergies[1]     Physical Exam:       Physical Exam  Blood pressure (!) 141/96, pulse 98, temperature 97.1 °F (36.2 °C), temperature source Temporal, height 5' 4\" (1.626 m), weight 154 lb, not currently breastfeeding.    Vitals:    02/06/25 1016 02/06/25 1033   BP: (!) 141/96 120/82   Pulse: 98    Temp: 97.1 °F (36.2 °C)    TempSrc: Temporal    Weight: 154 lb    Height: 5' 4\" (1.626 m)      Physical Exam   Constitutional: Patient is oriented to person, place, and time. Patient appears well-developed and well-nourished. No distress.   Head: Normocephalic.   Right Ear: Tympanic membrane, external ear and ear canal normal.   Left Ear: Tympanic membrane, external ear and ear canal normal.    Throat: Positive cobblestoning.  No tonsillitis or redness or exudate.  Clears her throat periodically through the visit  Eyes: Conjunctivae and EOM are normal.   Neck: Normal range of motion. No thyromegaly present.   Cardiovascular: Normal rate, regular rhythm and normal heart sounds.    Pulmonary/Chest: Effort normal and breath sounds normal. No respiratory distress.   Lymphadenopathy: Patient has no cervical adenopathy.  Neurological: Patient is alert and oriented to person, place, and time.   Skin: Skin is warm.   Psychiatry: Depressed affect due to her medical issues.    Vitals reviewed.           Assessment/Plan:      Diagnoses and all orders for this visit:    Intractable chronic migraine without aura and with status migrainosus  -     NEURO - INTERNAL  -     CBC With Differential With Platelet; Future  -     Comp Metabolic Panel (14); Future  -     C-Reactive Protein; Future  -     Sed Rate, Westergren (Automated); Future  -     Assay, Thyroid Stim Hormone; Future  She would like some blood work done today which I went ahead and ordered.  She needs to see neurology.  Phonophobia  -     CBC With Differential With Platelet; Future  -     Comp Metabolic Panel (14); Future  -     C-Reactive Protein; Future  -     Sed Rate, Westergren (Automated); Future    Photophobia  -     CBC With Differential With Platelet; Future  -     Comp Metabolic Panel (14); Future  -     C-Reactive Protein; Future  -     Sed Rate, Westergren (Automated); Future    Acute pharyngitis, unspecified etiology  -     montelukast (SINGULAIR) 10 MG Oral Tab; Take 1 tablet (10 mg total) by mouth nightly.  Lets try montelukast at bedtime and see how she does.  I told her this is most likely due to postnasal drainage.  Amenorrhea  -     OBG - INTERNAL  She needs to see gynecology.  Cervical cancer screening  -     OBG - INTERNAL        Referrals (if applicable)  Orders Placed This Encounter   Procedures    NEURO - INTERNAL     If he is booked  you can try to make an appointment with 1 of his partners and see them instead.     Referral Priority:   Routine     Referral Type:   OFFICE VISIT     Referred to Provider:   Chapo Morales MD     Requested Specialty:   NEUROLOGY     Number of Visits Requested:   3    OBG - INTERNAL     OB/GYN.     Referral Priority:   Routine     Referral Type:   OFFICE VISIT     Referred to Provider:   Olena Gonsalves PA-C     Requested Specialty:   OBSTETRICS & GYNECOLOGY     Number of Visits Requested:   3       Follow up if symptoms persist.  Take medicine (if given) as prescribed.  Approach to treatment discussed and patient/family member understands and agrees to plan.     No follow-ups on file.    There are no Patient Instructions on file for this visit.    Melissa Fonseca    2/6/2025    By signing my name below, IMelissa,  attest that this documentation has been prepared under the direction and in the presence of Sergio Ford DO.   Electronically Signed: Melissa Fonseca, 2/6/2025, 10:16 AM.    I, Sergio Ford DO,  personally performed the services described in this documentation. All medical record entries made by the scribe were at my direction and in my presence.  I have reviewed the chart and discharge instructions (if applicable) and agree that the record reflects my personal performance and is accurate and complete.  Sergio Ford DO, 2/6/2025, 12:13 PM                  [1]   Allergies  Allergen Reactions    Ibuprofen SWELLING, Tightness in Throat and ANAPHYLAXIS    Nsaids SWELLING    Radiology Contrast Iodinated Dyes HIVES and ITCHING

## 2025-02-17 ENCOUNTER — PATIENT MESSAGE (OUTPATIENT)
Dept: FAMILY MEDICINE CLINIC | Facility: CLINIC | Age: 45
End: 2025-02-17

## 2025-02-18 ENCOUNTER — OFFICE VISIT (OUTPATIENT)
Dept: FAMILY MEDICINE CLINIC | Facility: CLINIC | Age: 45
End: 2025-02-18
Payer: MEDICAID

## 2025-02-18 ENCOUNTER — TELEPHONE (OUTPATIENT)
Dept: FAMILY MEDICINE CLINIC | Facility: CLINIC | Age: 45
End: 2025-02-18

## 2025-02-18 VITALS
TEMPERATURE: 99 F | WEIGHT: 154.63 LBS | BODY MASS INDEX: 26.4 KG/M2 | SYSTOLIC BLOOD PRESSURE: 138 MMHG | HEART RATE: 90 BPM | DIASTOLIC BLOOD PRESSURE: 89 MMHG | HEIGHT: 64 IN

## 2025-02-18 DIAGNOSIS — M79.7 FIBROMYALGIA: ICD-10-CM

## 2025-02-18 DIAGNOSIS — F41.9 ANXIETY: ICD-10-CM

## 2025-02-18 DIAGNOSIS — L81.9 HYPERPIGMENTED SKIN LESION: ICD-10-CM

## 2025-02-18 DIAGNOSIS — N93.9 VAGINAL SPOTTING: ICD-10-CM

## 2025-02-18 DIAGNOSIS — R51.9 FREQUENT HEADACHES: Primary | ICD-10-CM

## 2025-02-18 DIAGNOSIS — F32.A DEPRESSIVE DISORDER: ICD-10-CM

## 2025-02-18 DIAGNOSIS — R79.82 ELEVATED C-REACTIVE PROTEIN (CRP): ICD-10-CM

## 2025-02-18 PROCEDURE — 99215 OFFICE O/P EST HI 40 MIN: CPT | Performed by: FAMILY MEDICINE

## 2025-02-18 NOTE — TELEPHONE ENCOUNTER
Pt dropped off medical evaluation physician's report forms at [facility] office. Copy of forms was given to pt. Forms faxed to medical records. Original forms sent to office in green stat bag.

## 2025-02-18 NOTE — PROGRESS NOTES
Patient ID: Geeta Macdonald is a 44 year old female.         The following individual(s) verbally consented to be recorded using ambient AI listening technology and understand that they can each withdraw their consent to this listening technology at any point by asking the clinician to turn off or pause the recording:    Patient name: Geeta Macdonald           HPI  Chief Complaint   Patient presents with    Lab     Hormonal labs     Referral     Aqua therapy        History of Present Illness  Geeta Macdonald is a 44 year old female who presents with a request for documentation related to cognitive impairments and chronic inflammation for legal purposes.    She is seeking assistance with documentation to support her case to vacate a worker's compensation settlement. She experienced severe headaches and cognitive impairments at the time of signing the settlement on January 30, 2024, which she attributes to chronic inflammation and sequelae of previous infections in her brain per MRI of the brain although there was no definite infection. She has a history of severe headaches following right elbow surgery, which was complicated by an infection she states of the right elbow. Around the time of signing the settlement, she also experienced light sensitivity and noise intolerance. She has undergone multiple surgeries, which she believes may have contributed to her symptoms.    Her MRI of the brain from April 21, 2023, showed no acute intracranial process or evidence of acute or subacute infarct, but noted a few scattered punctate flare signal hyperintense foci within the cerebral white matter, similar to findings from a previous MRI on July 13, 2022. Her C-reactive protein levels were mildly elevated at 0.49 on May 30, 2023, and 1.10 on February 6, 2025.    She reports the development of hyperpigmented lesions on her skin, including on her nipples, hips, and right hand, which are itchy and have a texture change.  She has not seen a dermatologist recently but had seen one five years ago for a different issue.    She experiences stiffness and pain due to fibromyalgia, which she would like to manage with aqua therapy. She resides in [place], where she has access to aqua therapy at a local center.  She states as long as she gets an order from a they will cover this for her.    She mentions a family history of late menopause and was wondering if I can order hormone test but I think this is best left to the gynecologist.  She has stopped having regular periods and only experiences occasional spotting.    Wt Readings from Last 6 Encounters:   02/18/25 154 lb 9.6 oz (70.1 kg)   02/06/25 154 lb (69.9 kg)   01/07/25 156 lb (70.8 kg)   06/08/24 156 lb (70.8 kg)   11/10/23 156 lb (70.8 kg)   08/21/23 150 lb (68 kg)       BMI Readings from Last 6 Encounters:   02/18/25 26.54 kg/m²   02/06/25 26.43 kg/m²   01/07/25 26.78 kg/m²   06/08/24 26.78 kg/m²   11/10/23 26.78 kg/m²   08/21/23 25.75 kg/m²       BP Readings from Last 6 Encounters:   02/18/25 138/89   02/06/25 120/82   06/08/24 122/85   11/10/23 115/77   10/30/23 128/81   08/21/23 128/85       Results  LABS  CRP: 0.49 mg/L (05/30/2023)  CRP: 1.10 mg/L (02/06/2025)    RADIOLOGY  MRI brain: No acute intracranial process, no evidence of acute or subacute infarct. Few scattered punctate flare signal hyperintense foci within the cerebral white matter, possibly sequelae of previous inflammation or infection. (04/21/2023)    Review of Systems          Medical History:      Past Medical History:    Back problem    Carpal tunnel syndrome on both sides    Carpal tunnel syndrome, right    Chronic headaches    Esophageal reflux    Frequent UTI    History of surgical removal of ganglion cyst    Hyperlipidemia    Medical management    Lipid screening    Per [employer]    Migraines    Osteoarthritis    left ankle and toes    Pyelonephritis    x2    Vaginal delivery (HCC)       Past Surgical History:    Procedure Laterality Date    Carpal tunnel release Left 2019    Elbow arthroscop,part debride Right 02/2022    with tendon repair    Other Left     removal of left ganglion cyst on left wrist    Wrist arthroscop,release xvers lig Right 01/24/2020    Right endoscopic carpal tunnel release          Current Outpatient Medications   Medication Sig Dispense Refill    montelukast (SINGULAIR) 10 MG Oral Tab Take 1 tablet (10 mg total) by mouth nightly. 90 tablet 1    pregabalin (LYRICA) 150 MG Oral Cap Take 1 capsule (150 mg total) by mouth 2 (two) times daily. Was already on gabapentin in the past and continues to have pain.  She has carpal tunnel pain. 60 capsule 1    traMADol 50 MG Oral Tab Take 1 tablet (50 mg total) by mouth every 6 (six) hours as needed for Pain. No alcohol or driving on this med. Stop if lethargic or hallucinating. 20 tablet 0    triamcinolone 0.1 % External Ointment Apply to affected area twice a day 80 g 1    Magnesium 100 MG Oral Tab Take 1 tablet (100 mg total) by mouth at bedtime. 30 tablet 0    DULoxetine 30 MG Oral Cap DR Particles Take 2 capsules (60 mg total) by mouth daily. 180 capsule 3    famotidine 20 MG Oral Tab Take 1 tablet (20 mg total) by mouth 2 (two) times daily as needed for Heartburn. 60 tablet 3    SUMAtriptan Succinate 100 MG Oral Tab Use at onset; repeat once after 2 HRS-ONLY 2 IN 24 HR MAX.  This is a 30 day supply. 9 tablet 3    meclizine 25 MG Oral Tab Take 1 tablet (25 mg total) by mouth 3 (three) times daily as needed for Dizziness. 20 tablet 0    cyclobenzaprine 5 MG Oral Tab Take 1 tablet (5 mg total) by mouth 3 (three) times daily as needed. 30 tablet 0    acetaminophen 500 MG Oral Tab Take 2 tablets (1,000 mg total) by mouth every 8 (eight) hours as needed for Pain. 40 tablet 0    ondansetron 4 MG Oral Tablet Dispersible Take 1 tablet (4 mg total) by mouth every 4 (four) hours as needed for Nausea. 10 tablet 0    docusate sodium (DULCOLAX STOOL SOFTENER) 100 MG  Oral Cap Take 1 capsule (100 mg total) by mouth 2 (two) times daily as needed for constipation. 30 capsule 0    atorvastatin 10 MG Oral Tab Take 1 tablet (10 mg total) by mouth nightly. 90 tablet 3     Allergies:Allergies[1]     Physical Exam:       Physical Exam  Blood pressure 138/89, pulse 90, temperature 98.8 °F (37.1 °C), temperature source Tympanic, height 5' 4\" (1.626 m), weight 154 lb 9.6 oz (70.1 kg), not currently breastfeeding.         Physical Exam   Constitutional: Patient is oriented to person, place, and time. Patient appears well-developed and well-nourished. No distress.   HENT:   Head: Normocephalic and atraumatic.   Neck: Normal range of motion.   Neurological: Patient is alert and oriented to person, place, and time.   Skin: Skin is warm and dry.  She has some slight hyperpigmentation on the first webspace dorsally of the right hand.  There is no cracking or cellulitis of the skin.  Otherwise I do not see any other rash.  Psychiatric: Patient has a anxious affect.  Convinced that she has parasites.  Nursing note and vitals reviewed.    Vitals have been reviewed.    Physical Exam        Assessment/Plan:        Diagnoses and all orders for this visit:    Frequent headaches  Quite a bit of paperwork to review along with diagnostic tests.  She states when she signed the paperwork to close the Worker's Compensation case and get her proceeds that she had cognitive impairment.  She will need to see the specialists to further evaluate this.   Will scan in all the paperwork she brought.  .  Fibromyalgia  -     RHEUMATOLOGY - INTERNAL  -     PHYSICAL THERAPY EXTERNAL  Wrote for aqua therapy.  Elevated C-reactive protein (CRP)  -     RHEUMATOLOGY - INTERNAL  I let her know that the elevated CRP and sed rate are minimally elevated and this is really a nonspecific finding.  It does not mean she has inflammation in her brain.  Hyperpigmented skin lesion  -     DERM - INTERNAL  She will see dermatology  Vaginal  rena  Already was given a referral to gynecology and will make an appointment  Anxiety  On duloxetine per Do our nurse practitioner and can continue  Depressive disorder  As above      Referrals (if applicable)  Orders Placed This Encounter   Procedures    RHEUMATOLOGY - INTERNAL     +++++++++++[facility] Rheumatology Department+++++++++++     Referral Priority:   Routine     Referral Type:   OFFICE VISIT     Referred to Provider:   Deysi Ledesma MD     Requested Specialty:   RHEUMATOLOGY     Number of Visits Requested:   3    DERM - INTERNAL     SKIN DOCTOR    Call [phone].     Referral Priority:   Routine     Referral Type:   OFFICE VISIT     Referred to Provider:   Gisele Simon MD     Requested Specialty:   DERMATOLOGY     Number of Visits Requested:   3    PHYSICAL THERAPY EXTERNAL     Treatment: Evaluate & Treat, Include modalities if therapist feels they are needed  Physician goals: Pain relief, Increased Function, Activities of daily living and Education  Frequency: 2-3 times per week.........Duration: 6-8 weeks      Patient would benefit from aqua therapy due to her fibromyalgia.  She can do this 2-3 times per week for the next 6 to 8 weeks and then further determine if she needs further aqua therapy or not.    Can take to various Physical Therapy locations as is APPROVED by your insurance.  MY FAX # : [phone] for therapist to send me notes     Referral Priority:   Routine     Referral Type:   Rehab Services     Requested Specialty:   Physical Therapy     Number of Visits Requested:   9         Follow up if symptoms persist.  Take medicine (if given) as prescribed.  Approach to treatment discussed and patient/family member understands and agrees to plan.     No follow-ups on file.      Assessment & Plan  Cognitive Impairment   Geeta seeks to vacate a worker's compensation settlement signed on January 30, 2024, citing cognitive impairment due to severe headaches and chronic  inflammation. MRI results from April 21, 2023, and July 13, 2022, show no acute intracranial process or evidence of acute or subacute infarct, with findings consistent with previous inflammation or infection. Mildly elevated C-reactive protein levels are nonspecific inflammatory markers. Recommended further evaluation by neurology and rheumatology.  - Refer to neurology  - Refer to rheumatology    Fibromyalgia  Geeta experiences stiffness and pain, alleviated by aqua therapy. She requested a referral for aqua therapy to manage her symptoms.  - Order aqua therapy 2-3 times per week for 6-8 weeks    Hyperpigmented Lesions  Geeta reports itchy hyperpigmented lesions with texture changes on her nipples, hips, and other areas. She has not seen a dermatologist recently.  - Refer to dermatology    Menopausal Symptoms  Geeta reports vaginal spotting and suspects menopause, with a family history of late menopause.  - Refer to gynecology for hormonal evaluation    General Health Maintenance  Geeta needs routine gynecological care.  - Refer to gynecology    Follow-up  - Follow up with neurology  - Follow up with rheumatology  - Follow up with dermatology  - Follow up with gynecology.    Sergio Ford DO  2/18/2025          [1]   Allergies  Allergen Reactions    Ibuprofen SWELLING, Tightness in Throat and ANAPHYLAXIS    Nsaids SWELLING    Radiology Contrast Iodinated Dyes HIVES and ITCHING

## 2025-02-19 ENCOUNTER — LAB ENCOUNTER (OUTPATIENT)
Dept: LAB | Age: 45
End: 2025-02-19
Attending: STUDENT IN AN ORGANIZED HEALTH CARE EDUCATION/TRAINING PROGRAM
Payer: MEDICAID

## 2025-02-19 ENCOUNTER — OFFICE VISIT (OUTPATIENT)
Dept: OBGYN CLINIC | Facility: CLINIC | Age: 45
End: 2025-02-19

## 2025-02-19 VITALS
HEART RATE: 80 BPM | BODY MASS INDEX: 26 KG/M2 | WEIGHT: 150 LBS | SYSTOLIC BLOOD PRESSURE: 135 MMHG | DIASTOLIC BLOOD PRESSURE: 88 MMHG

## 2025-02-19 DIAGNOSIS — N95.1 PERIMENOPAUSE: Primary | ICD-10-CM

## 2025-02-19 DIAGNOSIS — N92.6 IRREGULAR MENSES: ICD-10-CM

## 2025-02-19 DIAGNOSIS — L30.9 DERMATITIS OF NIPPLE: ICD-10-CM

## 2025-02-19 LAB — FSH SERPL-ACNC: 38.8 MIU/ML

## 2025-02-19 PROCEDURE — 99214 OFFICE O/P EST MOD 30 MIN: CPT | Performed by: STUDENT IN AN ORGANIZED HEALTH CARE EDUCATION/TRAINING PROGRAM

## 2025-02-19 PROCEDURE — 36415 COLL VENOUS BLD VENIPUNCTURE: CPT

## 2025-02-19 PROCEDURE — 83001 ASSAY OF GONADOTROPIN (FSH): CPT

## 2025-02-19 NOTE — PROGRESS NOTES
[facility]  Obstetrics and Gynecology  Gyne Problem Visit      Geeta Macdonald is a 44 year old female  who is a new patient to me who is presenting with multiple concerns. States all her symptoms started after experiencing complications of infections after wrist and elbow surgeries in .   She states she started to notice more irregular cycles since then, stating her menses occurs every three months. Notes periods to be lighter with only 3 days of bleeding. She is not on any form of contraception. Last used Depo in .   She also notes increasing episodes of hot flashes, worse at night and difficulty sleeping. Notes difficulty in concentration and with her memory. She is not on any form of HRT.   She has also noticed multiple pruritic patches of hyperpigmentation throughout the body. States it worse on areola. Denies any breast masses, pain, or galactorrhea. No personal or familial history of breast cancer. She had a normal screening mammogram end of last year.     Patient's last menstrual period was 2024 (within days).     Pap:   Contraception:None    OBSTETRICS HISTORY:  OB History    Para Term  AB Living   3 3 3 0 0 3   SAB IAB Ectopic Multiple Live Births   0 0 0 0 0       GYNE HISTORY:  Hx Prior Abnormal Pap: No   Menarche: 9y/o (2025  7:40 AM)  Period Cycle (Days): Irregualr (2025  7:40 AM)  Period Duration (Days): 3 days (2025  7:40 AM)  Period Flow: Light (2025  7:40 AM)  Use of Birth Control (if yes, specify type): None (2025  7:40 AM)  Hx Prior Abnormal Pap: No (2025  7:40 AM)         No data to display                  History   Sexual Activity    Sexual activity: Not on file       MEDICAL HISTORY:  Past Medical History:   Diagnosis Date    Back problem     Carpal tunnel syndrome on both sides     Carpal tunnel syndrome, right 2019    Chronic headaches     Esophageal reflux     Frequent UTI     History of surgical removal of ganglion  cyst 1992, 2001    Hyperlipidemia 2011    Medical management    Lipid screening 02/04/2012    Per [employer]    Migraines     Osteoarthritis     left ankle and toes    Pyelonephritis     x2    Vaginal delivery (HCC) 02/12/1995, 08/24/1997, 06/28/2003     Past Surgical History:   Procedure Laterality Date    Carpal tunnel release Left 2019    Elbow arthroscop,part debride Right 02/2022    with tendon repair    Other Left     removal of left ganglion cyst on left wrist    Wrist arthroscop,release xvers lig Right 01/24/2020    Right endoscopic carpal tunnel release       SOCIAL HISTORY:  Social History     Socioeconomic History    Marital status:      Spouse name: Not on file    Number of children: Not on file    Years of education: Not on file    Highest education level: Not on file   Occupational History    Not on file   Tobacco Use    Smoking status: Never    Smokeless tobacco: Never   Vaping Use    Vaping status: Never Used   Substance and Sexual Activity    Alcohol use: Never    Drug use: No    Sexual activity: Not on file   Other Topics Concern     Service Not Asked    Blood Transfusions Not Asked    Caffeine Concern Yes     Comment: (Coffee, Soda) 2 cups daily    Occupational Exposure Not Asked    Hobby Hazards Not Asked    Sleep Concern Not Asked    Stress Concern Not Asked    Weight Concern Not Asked    Special Diet Not Asked    Back Care Not Asked    Exercise Not Asked    Bike Helmet Not Asked    Seat Belt Not Asked    Self-Exams Not Asked    Left Handed No    Right Handed Yes    Currently spends a great deal of time in the sun No    Past Sunlamp Treatments for Acne Not Asked    History of tanning No    Hx of Spending Great Deal of Time in Sun No    Bad sunburns in the past No    Tanning Salons in the Past No    Hx of Radiation Treatments No    Regular use of sun block Not Asked   Social History Narrative    Not on file     Social Drivers of Health     Food Insecurity: Not on file   Transportation  Needs: Not on file   Stress: Not on file   Housing Stability: Not on file       MEDICATIONS:    Current Outpatient Medications:     [START ON 2/20/2025] Estradiol-Norethindrone Acet 0.05-0.14 MG/DAY Transdermal Patch Biweekly, Place 1 patch onto the skin twice a week., Disp: 8 patch, Rfl: 2    montelukast (SINGULAIR) 10 MG Oral Tab, Take 1 tablet (10 mg total) by mouth nightly., Disp: 90 tablet, Rfl: 1    pregabalin (LYRICA) 150 MG Oral Cap, Take 1 capsule (150 mg total) by mouth 2 (two) times daily. Was already on gabapentin in the past and continues to have pain.  She has carpal tunnel pain., Disp: 60 capsule, Rfl: 1    traMADol 50 MG Oral Tab, Take 1 tablet (50 mg total) by mouth every 6 (six) hours as needed for Pain. No alcohol or driving on this med. Stop if lethargic or hallucinating., Disp: 20 tablet, Rfl: 0    triamcinolone 0.1 % External Ointment, Apply to affected area twice a day, Disp: 80 g, Rfl: 1    Magnesium 100 MG Oral Tab, Take 1 tablet (100 mg total) by mouth at bedtime., Disp: 30 tablet, Rfl: 0    DULoxetine 30 MG Oral Cap DR Particles, Take 2 capsules (60 mg total) by mouth daily., Disp: 180 capsule, Rfl: 3    famotidine 20 MG Oral Tab, Take 1 tablet (20 mg total) by mouth 2 (two) times daily as needed for Heartburn., Disp: 60 tablet, Rfl: 3    SUMAtriptan Succinate 100 MG Oral Tab, Use at onset; repeat once after 2 HRS-ONLY 2 IN 24 HR MAX.  This is a 30 day supply., Disp: 9 tablet, Rfl: 3    meclizine 25 MG Oral Tab, Take 1 tablet (25 mg total) by mouth 3 (three) times daily as needed for Dizziness., Disp: 20 tablet, Rfl: 0    cyclobenzaprine 5 MG Oral Tab, Take 1 tablet (5 mg total) by mouth 3 (three) times daily as needed., Disp: 30 tablet, Rfl: 0    acetaminophen 500 MG Oral Tab, Take 2 tablets (1,000 mg total) by mouth every 8 (eight) hours as needed for Pain., Disp: 40 tablet, Rfl: 0    ondansetron 4 MG Oral Tablet Dispersible, Take 1 tablet (4 mg total) by mouth every 4 (four) hours as  needed for Nausea., Disp: 10 tablet, Rfl: 0    docusate sodium (DULCOLAX STOOL SOFTENER) 100 MG Oral Cap, Take 1 capsule (100 mg total) by mouth 2 (two) times daily as needed for constipation., Disp: 30 capsule, Rfl: 0    atorvastatin 10 MG Oral Tab, Take 1 tablet (10 mg total) by mouth nightly., Disp: 90 tablet, Rfl: 3    ALLERGIES:  Allergies[1]      REVIEW OF SYSTEMS:  Review of Systems   Constitutional:  Negative for chills, fever and unexpected weight change.   Respiratory: Negative.     Cardiovascular: Negative.    Gastrointestinal:  Negative for abdominal pain, constipation, diarrhea and nausea.   Genitourinary:  Positive for menstrual problem. Negative for dyspareunia, dysuria, genital sores, hematuria, pelvic pain, vaginal bleeding, vaginal discharge and vaginal pain.   Musculoskeletal: Negative.    Skin:  Positive for rash.   Neurological: Negative.    Hematological: Negative.    Psychiatric/Behavioral:  Positive for decreased concentration and sleep disturbance.        PHYSICAL EXAM:  /88 (BP Location: Right arm, Patient Position: Sitting, Cuff Size: adult)   Pulse 80   Wt 150 lb (68 kg)   LMP 12/19/2024 (Within Days)   BMI 25.75 kg/m²     GENERAL: well developed, well nourished, in no apparent distress  ABDOMEN: Soft, non distended; non tender, no masses  BREAST: Adjacent to right areola is a hyperpigmented patch skin with no scale or erythema.  No masses or lumps palpated. No tenderness. No bloody nipple discharge or nipple inversion. No peau d'orange changes. No axillary lymphadenopathy.        ASSESSMENT:       ICD-10-CM    1. Perimenopause  N95.1 Estradiol-Norethindrone Acet 0.05-0.14 MG/DAY Transdermal Patch Biweekly      2. Irregular menses  N92.6 FSH      3. Dermatitis of nipple  L30.9           Plan:  - FSH ordered  - Discussed risks and benefits of  hormone therapy including treatment of hot flashes and night sweats, vaginal dryness and pain with intercourse, prevention of bone loss  and osteoporosis and sleep disturbances due to night sweats.  There is a slight increased risk of stroke or blood clots in the legs or lungs (especially if the estrogen is taken in a pill form) and breast cancer with long term use (greater than 5 years). Progestins eliminate any increased risk of endometrial cancer in patients with a uterus. Patient most interested in starting patch. Rx for Combipatch provided, RTC in 3 months for follow-up  - Areolar dermatitis, will recommend she applies Aquaphor and Vaseline to area. Avoid scented soaps and lotions. If she experiences worsening of itching, she can apply hydrocortisone cream as needed. Patient has referral to follow-up with dermatology.    Requested Prescriptions     Signed Prescriptions Disp Refills    Estradiol-Norethindrone Acet 0.05-0.14 MG/DAY Transdermal Patch Biweekly 8 patch 2     Sig: Place 1 patch onto the skin twice a week.       RACHEL ROBERTO PA-C  7:46 AM  2/19/2025      Spent total time 30 minutes on obtaining history / chart review, evaluating patient / performing medically appropriate exam, discussing treatment options, counseling / educating, and completing documentation, coordinating care.         [1]   Allergies  Allergen Reactions    Ibuprofen SWELLING, Tightness in Throat and ANAPHYLAXIS    Nsaids SWELLING    Radiology Contrast Iodinated Dyes HIVES and ITCHING

## 2025-02-21 NOTE — TELEPHONE ENCOUNTER
Dr Ford,    Please see patients message:  Last thing (I promisse) can you please remove the part under chronic headaches that says \"so I cannot make any determination whether she was cognitively impaired or not\" and leave the part where you refer me to the neurologist?;

## 2025-02-27 ENCOUNTER — HOSPITAL ENCOUNTER (OUTPATIENT)
Dept: MRI IMAGING | Age: 45
Discharge: HOME | End: 2025-02-27
Attending: FAMILY MEDICINE
Payer: MEDICAID

## 2025-02-27 ENCOUNTER — OFFICE VISIT (OUTPATIENT)
Dept: FAMILY MEDICINE CLINIC | Facility: CLINIC | Age: 45
End: 2025-02-27

## 2025-02-27 VITALS
SYSTOLIC BLOOD PRESSURE: 121 MMHG | DIASTOLIC BLOOD PRESSURE: 87 MMHG | BODY MASS INDEX: 25.61 KG/M2 | HEART RATE: 88 BPM | TEMPERATURE: 98 F | WEIGHT: 150 LBS | HEIGHT: 64 IN

## 2025-02-27 DIAGNOSIS — R79.82 ELEVATED C-REACTIVE PROTEIN (CRP): ICD-10-CM

## 2025-02-27 DIAGNOSIS — J02.9 ACUTE PHARYNGITIS, UNSPECIFIED ETIOLOGY: ICD-10-CM

## 2025-02-27 DIAGNOSIS — M79.7 FIBROMYALGIA: ICD-10-CM

## 2025-02-27 DIAGNOSIS — F41.9 ANXIETY: ICD-10-CM

## 2025-02-27 DIAGNOSIS — L65.9 HAIR LOSS: ICD-10-CM

## 2025-02-27 DIAGNOSIS — G89.29 CHRONIC RIGHT SHOULDER PAIN: ICD-10-CM

## 2025-02-27 DIAGNOSIS — F32.A DEPRESSIVE DISORDER: ICD-10-CM
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
## 2025-02-27 DIAGNOSIS — R51.9 FREQUENT HEADACHES: Primary | ICD-10-CM

## 2025-02-27 DIAGNOSIS — M25.511 CHRONIC RIGHT SHOULDER PAIN: ICD-10-CM

## 2025-02-27 DIAGNOSIS — M25.611 DECREASED RANGE OF MOTION OF RIGHT SHOULDER: ICD-10-CM

## 2025-02-27 PROCEDURE — 99215 OFFICE O/P EST HI 40 MIN: CPT | Performed by: FAMILY MEDICINE

## 2025-02-27 PROCEDURE — 73221 MRI JOINT UPR EXTREM W/O DYE: CPT | Performed by: FAMILY MEDICINE

## 2025-02-27 RX ORDER — MONTELUKAST SODIUM 10 MG/1
10 TABLET ORAL NIGHTLY
Qty: 90 TABLET | Refills: 1 | Status: SHIPPED | OUTPATIENT
Start: 2025-02-27 | End: 2025-08-26

## 2025-02-27 NOTE — PROGRESS NOTES
Patient ID: Geeta Macdonald is a 44 year old female.         The following individual(s) verbally consented to be recorded using ambient AI listening technology and understand that they can each withdraw their consent to this listening technology at any point by asking the clinician to turn off or pause the recording:    Patient name: Geeta Macdonald  Additional names:           HPI  Chief Complaint   Patient presents with    Follow - Up     Disability forms       History of Present Illness  Geeta Macdonald is a 44 year old female who presents with chronic headaches and blurred vision.    She experiences chronic headaches and blurred vision, significantly affecting her ability to read and comprehend legal documents. These symptoms have been persistent and interfere with her management of a social security disability claim. She has communicated these difficulties to her , highlighting her inability to read transcripts due to these symptoms through emails that she sent.    She describes a history of cognitive impairments, which she believes have affected her decision-making abilities, particularly in legal matters. She recalls an incident where she signed a settlement release without fully understanding the consequences, attributing this to her cognitive impairments and the advice of her  at the time.    She is experiencing stress related to her legal and financial situation, which she believes has contributed to her symptoms. She reports losing half of her hair, which she attributes to stress.    She is not currently receiving Social Security disability benefits, as her claim was closed in August 2022. She was under the impression that her claim was being handled, but later discovered it had been closed without her knowledge. She has attempted to appeal the decision but was informed that the appeal was not filed within the required timeframe.    Wt Readings from Last 6 Encounters:   02/27/25  150 lb (68 kg)   02/19/25 150 lb (68 kg)   02/18/25 154 lb 9.6 oz (70.1 kg)   02/06/25 154 lb (69.9 kg)   01/07/25 156 lb (70.8 kg)   06/08/24 156 lb (70.8 kg)       BMI Readings from Last 6 Encounters:   02/27/25 25.75 kg/m²   02/19/25 25.75 kg/m²   02/18/25 26.54 kg/m²   02/06/25 26.43 kg/m²   01/07/25 26.78 kg/m²   06/08/24 26.78 kg/m²       BP Readings from Last 6 Encounters:   02/27/25 121/87   02/19/25 135/88   02/18/25 138/89   02/06/25 120/82   06/08/24 122/85   11/10/23 115/77       Results      Review of Systems    Anxious and stressed.      Medical History:      Past Medical History:    Back problem    Carpal tunnel syndrome on both sides    Carpal tunnel syndrome, right    Chronic headaches    Esophageal reflux    Frequent UTI    History of surgical removal of ganglion cyst    Hyperlipidemia    Medical management    Lipid screening    Per [employer]    Migraines    Osteoarthritis    left ankle and toes    Pyelonephritis    x2    Vaginal delivery (HCC)       Past Surgical History:   Procedure Laterality Date    Carpal tunnel release Left 2019    Elbow arthroscop,part debride Right 02/2022    with tendon repair    Other Left     removal of left ganglion cyst on left wrist    Wrist arthroscop,release xvers lig Right 01/24/2020    Right endoscopic carpal tunnel release          Current Outpatient Medications   Medication Sig Dispense Refill    Estradiol-Norethindrone Acet 0.05-0.14 MG/DAY Transdermal Patch Biweekly Place 1 patch onto the skin twice a week. 8 patch 2    montelukast (SINGULAIR) 10 MG Oral Tab Take 1 tablet (10 mg total) by mouth nightly. 90 tablet 1    pregabalin (LYRICA) 150 MG Oral Cap Take 1 capsule (150 mg total) by mouth 2 (two) times daily. Was already on gabapentin in the past and continues to have pain.  She has carpal tunnel pain. 60 capsule 1    traMADol 50 MG Oral Tab Take 1 tablet (50 mg total) by mouth every 6 (six) hours as needed for Pain. No alcohol or driving on this med. Stop if  lethargic or hallucinating. 20 tablet 0    triamcinolone 0.1 % External Ointment Apply to affected area twice a day 80 g 1    Magnesium 100 MG Oral Tab Take 1 tablet (100 mg total) by mouth at bedtime. 30 tablet 0    DULoxetine 30 MG Oral Cap DR Particles Take 2 capsules (60 mg total) by mouth daily. 180 capsule 3    famotidine 20 MG Oral Tab Take 1 tablet (20 mg total) by mouth 2 (two) times daily as needed for Heartburn. 60 tablet 3    SUMAtriptan Succinate 100 MG Oral Tab Use at onset; repeat once after 2 HRS-ONLY 2 IN 24 HR MAX.  This is a 30 day supply. 9 tablet 3    meclizine 25 MG Oral Tab Take 1 tablet (25 mg total) by mouth 3 (three) times daily as needed for Dizziness. 20 tablet 0    cyclobenzaprine 5 MG Oral Tab Take 1 tablet (5 mg total) by mouth 3 (three) times daily as needed. 30 tablet 0    acetaminophen 500 MG Oral Tab Take 2 tablets (1,000 mg total) by mouth every 8 (eight) hours as needed for Pain. 40 tablet 0    ondansetron 4 MG Oral Tablet Dispersible Take 1 tablet (4 mg total) by mouth every 4 (four) hours as needed for Nausea. 10 tablet 0    docusate sodium (DULCOLAX STOOL SOFTENER) 100 MG Oral Cap Take 1 capsule (100 mg total) by mouth 2 (two) times daily as needed for constipation. 30 capsule 0    atorvastatin 10 MG Oral Tab Take 1 tablet (10 mg total) by mouth nightly. 90 tablet 3     Allergies:Allergies[1]     Physical Exam:       Physical Exam  Blood pressure 121/87, pulse 88, temperature 98.4 °F (36.9 °C), temperature source Tympanic, height 5' 4\" (1.626 m), weight 150 lb (68 kg), last menstrual period 12/19/2024, not currently breastfeeding.         Physical Exam   Constitutional: Patient is oriented to person, place, and time. Patient appears well-developed and well-nourished. No distress.   HENT:   Head: Normocephalic and atraumatic.   Skin: Skin is warm and dry.   Psychiatric: Patient has a stressed and anxious affect due to this legal issue that she has been dealing with for some  time  Nursing note and vitals reviewed.    Physical Exam        Assessment/Plan:        Diagnoses and all orders for this visit:    Frequent headaches  She is very frustrated with her past  that helped her as she felt she was not in the correct state of mind to sign the paperwork and she was expecting that she would be getting payments for her life for her disability but now realizes that it was a one-time payment.  She also showed me paperwork that states the  was supposed to take only 20% fee but it was 40%.  I explained that this is really not something a physician takes care of but she needs to find an  that we will help her with this and possibly go after the initial .  She brought quite a bit of paperwork and notes that I reviewed.  These will be scanned into the computer.  She does not have the neurology referral but I gave her so I reprinted it for her and explained that she needs to make an appointment.  Acute pharyngitis, unspecified etiology  -     montelukast (SINGULAIR) 10 MG Oral Tab; Take 1 tablet (10 mg total) by mouth nightly.  While here she wanted a refill of her montelukast which I did  Fibromyalgia  She does not have the rheumatology referral that I given her and I printed it out again  Elevated C-reactive protein (CRP)  See rheumatology  Depressive disorder  Clearly has depression and anxiety and will continue her duloxetine  Anxiety  As above  Hair loss  I explained this is from stress.      Referrals (if applicable)  No orders of the defined types were placed in this encounter.        Follow up if symptoms persist.  Take medicine (if given) as prescribed.  Approach to treatment discussed and patient/family member understands and agrees to plan.     No follow-ups on file.      Assessment & Plan  Chronic Headaches  Reports chronic headaches impairing daily activities and causing blurred vision. Discussed referral to Neurology for comprehensive evaluation and  management, which may aid in legal documentation of impairment.  - Referral to Neurology for evaluation and management of chronic headaches and blurred vision    Inflammatory Pain  Reports generalized aches and pains with inflammation. Discussed referral to Rheumatology for detailed assessment and management, including potential medication and lifestyle modifications.  - Referral to Rheumatology (Dr. Banda) for evaluation of inflammatory pain    Hair Loss  Reports significant hair loss attributed to stress. Discussed referral to Dermatology for evaluation and potential treatments, including topical or systemic therapies.  - Referral to Dermatology for evaluation of hair loss  - Advise stress reduction techniques    General Health Maintenance  Dealing with significant legal and financial stress. Requested medical notes reflect 'settlement paperwork' instead of long-term disability.  - Send prescription for Montelukast  - Ensure medical notes reflect 'settlement paperwork'    Follow-up  - Print and provide referral to Neurology  - Print and provide referral to Rheumatology  - Advise patient to call Neurology department for the first available provider.    Sergio Ford,   2/27/2025          [1]   Allergies  Allergen Reactions    Ibuprofen SWELLING, Tightness in Throat and ANAPHYLAXIS    Nsaids SWELLING    Radiology Contrast Iodinated Dyes HIVES and ITCHING

## 2025-03-30 DIAGNOSIS — N95.1 PERIMENOPAUSE: ICD-10-CM

## 2025-04-11 ENCOUNTER — HOSPITAL ENCOUNTER (OUTPATIENT)
Dept: ULTRASOUND IMAGING | Age: 45
Discharge: HOME | End: 2025-04-11
Attending: STUDENT IN AN ORGANIZED HEALTH CARE EDUCATION/TRAINING PROGRAM
Payer: MEDICAID

## 2025-04-11 DIAGNOSIS — Z87.42 HX OF OVARIAN CYST: ICD-10-CM

## 2025-04-11 PROCEDURE — 76830 TRANSVAGINAL US NON-OB: CPT | Performed by: STUDENT IN AN ORGANIZED HEALTH CARE EDUCATION/TRAINING PROGRAM

## 2025-04-11 PROCEDURE — 76856 US EXAM PELVIC COMPLETE: CPT | Performed by: STUDENT IN AN ORGANIZED HEALTH CARE EDUCATION/TRAINING PROGRAM

## 2025-05-21 ENCOUNTER — TELEPHONE (OUTPATIENT)
Dept: GASTROENTEROLOGY | Facility: CLINIC | Age: 45
End: 2025-05-21

## 2025-05-21 ENCOUNTER — OFFICE VISIT (OUTPATIENT)
Dept: GASTROENTEROLOGY | Facility: CLINIC | Age: 45
End: 2025-05-21
Payer: MEDICAID

## 2025-05-21 VITALS
DIASTOLIC BLOOD PRESSURE: 85 MMHG | SYSTOLIC BLOOD PRESSURE: 119 MMHG | HEIGHT: 67 IN | HEART RATE: 75 BPM | WEIGHT: 156.81 LBS | BODY MASS INDEX: 24.61 KG/M2

## 2025-05-21 DIAGNOSIS — R14.0 BLOATING: ICD-10-CM

## 2025-05-21 DIAGNOSIS — K59.00 CONSTIPATION, UNSPECIFIED CONSTIPATION TYPE: Primary | ICD-10-CM

## 2025-05-21 DIAGNOSIS — K58.2 IRRITABLE BOWEL SYNDROME WITH BOTH CONSTIPATION AND DIARRHEA: ICD-10-CM

## 2025-05-21 DIAGNOSIS — K21.9 GASTROESOPHAGEAL REFLUX DISEASE, UNSPECIFIED WHETHER ESOPHAGITIS PRESENT: ICD-10-CM

## 2025-05-21 DIAGNOSIS — K58.2 IRRITABLE BOWEL SYNDROME WITH CONSTIPATION AND DIARRHEA: ICD-10-CM

## 2025-05-21 PROCEDURE — 99215 OFFICE O/P EST HI 40 MIN: CPT | Performed by: INTERNAL MEDICINE

## 2025-05-21 RX ORDER — DICYCLOMINE HCL 20 MG
20 TABLET ORAL
COMMUNITY
Start: 2025-03-27

## 2025-05-21 RX ORDER — MAGNESIUM 200 MG
200 TABLET ORAL DAILY
Qty: 90 TABLET | Refills: 0 | Status: SHIPPED | OUTPATIENT
Start: 2025-05-21 | End: 2025-08-19

## 2025-05-21 RX ORDER — DIAZEPAM 2 MG/1
2 TABLET ORAL EVERY 12 HOURS PRN
COMMUNITY
Start: 2025-05-17

## 2025-05-21 NOTE — TELEPHONE ENCOUNTER
Scheduled for:  Colonoscopy 14074,egd [id number]  Provider Name: Dr. Felix  Date:  10/6/2025  Location:[facility]  Sedation:  MAC  Time:12:30 Pm  (Patient is aware that endo/eosc will call with arrival time )  Prep:   Suprep or Trilyte Prep Instructions Given At The Office Visit/ Mychart  Meds/Allergies Reconciled?: Physician Reviewed   Diagnosis with codes:  Gerd  k21.9 , bloating R14.0 , IBS K58.2, constipation K59.00  Was patient informed to call insurance with codes (Y/N):  Yes  Referral sent?:  Referral was sent at the time of electronic surgical scheduling.  EM or [facility] notified?:  I sent an electronic request to Endo Scheduling and received a confirmation today.  Medication Orders:  Pt is aware to NOT take iron pills, herbal meds and diet supplements for 7 days before exam. Also to NOT take any form of alcohol, recreational drugs and any forms of ED meds 24 hours before exam.   Misc Orders:  -Prep: -Buy over the counter dulcolax laxative, and take daily for 3 days prior to drinking the bowel prep. Suprep or trilyte or equivalent     ** If MAC @ [facility]/NE:                 - HOLD ACE/ARBs the night before and the day of the procedure(s)      Further instructions given by staff:  I provide prep instructions to patient at the time of the appointment/Mychart and reviewed date, and location, patient verbalized that she understood and is aware to call if she has any questions.    Patient was informed about the new cancellation policy for his/her procedure. Patient was also given a copy of the cancellation policy at the time of the appointment and verbalized understanding.

## 2025-05-21 NOTE — PATIENT INSTRUCTIONS
1. Constipation, unspecified constipation type  2. Irritable bowel syndrome with both constipation and diarrhea  3. Bloating  4. Gastroesophageal reflux disease, unspecified whether esophagitis present    Discussed unsedated but ok with sedation  Discussed best attempt to have all female  Exercise, water, fiber and keep taking miralax  Here are some reflux precautions:   - Avoid trigger foods  - Anti-reflux measures: raising the head of the bed at night, avoiding tight clothing or belts, avoiding eating late at night and not lying down shortly after mealtime and achieving weight loss   - Avoid NSAID's, caffeine, peppermints, alcohol, tobacco and foods that incite symptoms       -Schedule EGD/colonoscopy w/MAC for bloating, reflux, screening colonoscopy, irregular bowel  -Prep: -Buy over the counter dulcolax laxative, and take daily for 3 days prior to drinking the bowel prep. Suprep or trilyte or equivalent    ** If MAC @ [facility]/[place]:    - HOLD ACE/ARBs the night before and the day of the procedure(s)    - NO alcohol, recreational drugs nor erectile dysfunction mediations 24 hours before procedure(s)   - NO herbal supplements or weight loss medications x 7 days prior to the procedure(s)    ** If MAC @ [facility] or IV twilight - continue all medications as prescribed
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
## 2025-06-11 ENCOUNTER — TELEPHONE (OUTPATIENT)
Age: 45
End: 2025-06-11

## 2025-06-11 NOTE — TELEPHONE ENCOUNTER
1st No show, [facility], Dr Ledesma, follow up,TE created 06/11/25, letter sent via [employer].

## 2025-06-12 ENCOUNTER — VIRTUAL PHONE E/M (OUTPATIENT)
Dept: FAMILY MEDICINE CLINIC | Facility: CLINIC | Age: 45
End: 2025-06-12
Payer: MEDICAID

## 2025-06-12 ENCOUNTER — TELEPHONE (OUTPATIENT)
Age: 45
End: 2025-06-12

## 2025-06-12 DIAGNOSIS — F32.A DEPRESSIVE DISORDER: ICD-10-CM

## 2025-06-12 DIAGNOSIS — R79.82 ELEVATED C-REACTIVE PROTEIN (CRP): ICD-10-CM

## 2025-06-12 DIAGNOSIS — F41.9 ANXIETY: ICD-10-CM

## 2025-06-12 DIAGNOSIS — R51.9 FREQUENT HEADACHES: Primary | ICD-10-CM

## 2025-06-12 DIAGNOSIS — L20.89 FLEXURAL ATOPIC DERMATITIS: ICD-10-CM

## 2025-06-12 DIAGNOSIS — M79.7 FIBROMYALGIA: ICD-10-CM

## 2025-06-12 RX ORDER — TRIAMCINOLONE ACETONIDE 1 MG/G
OINTMENT TOPICAL
Qty: 45 G | Refills: 0 | Status: SHIPPED | OUTPATIENT
Start: 2025-06-12

## 2025-06-12 NOTE — TELEPHONE ENCOUNTER
Called patient verified patient name and . Patient stated during last visit 2023 it was discussed that a stool test would be ordered to test for \"overgrowth of yeast\" and for comprehensive stool test with yeast cultures and candida antibody panel. Due to her fibromyalgia. Per patient she was late yesterday 25 to her appointment and had to reschedule to 12/10/25 wanted to make sure she remembered to ask as she forgot before.

## 2025-06-12 NOTE — PROGRESS NOTES
Telephone VISIT PROGRESS NOTE (Non-Covid-19)  Todays date: 6/12/2025 9:15 AM        Most recent Nurse Triage message / Mychart message from patient:           The following individual(s) verbally consented to be recorded using ambient AI listening technology and understand that they can each withdraw their consent to this listening technology at any point by asking the clinician to turn off or pause the recording:    Patient name: Geeta Macdonald  Additional names:            Due to the COVID-19 emergency implementation plan, this patient's visit was converted to a telephone visit as agreed upon with the patient.    Telephone Check-In    Geeta Macdonald verbally consents to a Check-In service on 06/12/25.  Patient understands and accepts financial responsibility for any deductible, co-insurance and/or co-pays associated with this service.  Patient call triage note reviewed.      I spoke to Geeta Macdonald (or patient's family member/partner) by video, verified date of birth, and discussed current concerns.      If patient is a child then of course the parent or guardian will be giving the verbal consent for the video check-in and of course I will be speaking to this person for this visit.  Note, many times the patient however is nearby and I can hear the patient answering questions while the person on the video with me is talking to the patient.    This also goes for family members who would rather speak to me on behalf of their Cymra[place]-speaking (or another foreign language) patient.  The patient will give consent but I will be speaking to the person that would be translating.  Also some of these patients with possible COVID-19 infection or some type of other illness are too ill to be on video and would rather have a designated person speak for them and in that case we will be speaking to that designated person and all parties involved understand the disclaimer that goes along with the consent for the  video check-in service as stated above.        History of Present Illness:     History of Present Illness  Geeta Macdonald is a 45 year old female who presents for assistance with a disability case letter.    She is seeking assistance with a letter to support her disability case, which was closed two years ago. She describes herself as 'totally impaired' and mentions that her  did not follow through with the case. She requests a letter based on her medical records to demonstrate sequelae of previous brain inflammation, supported by two MRIs. She experiences speech delays and fatigue, both physically and mentally.    She has a history of fibromyalgia. She had an appointment with a rheumatologist, but it was rescheduled due to her late arrival. There is concern about yeast overgrowth, and she states that the rheumatologist a couple years ago wanted her to do a yeast test but she cannot remember exactly what it was.  I asked her to get in touch with rheumatology    She reports very dry skin on her hands, for which she has previously used triamcinolone during flare-ups.    She experiences memory loss, which affects her ability to remember details and follow through with tasks.          Duration of video along with documentation in minutes: 8 minutes on video with an additional 25-minute reviewing her letter that she had attached to a patient message as well as on chart review and on documentation      Patient sent me a prewritten sample letter that was attached to a patient message.  The letter is below.  She was hoping that I would use the letter as a template and agree to state that that she is medically unable to continue litigating her case without pro shama legal representation.    Date: June 12, 2025  To Whom It May Concern:  RE: Geetaananth Nicee -- Medical Support for Petition for Referral to [employer] [place]   I am the primary care provider for Ms. Geeta Claire, and I am writing this letter at  her request, in support of her formal petition to the Court requesting referral to a pro shama legal assistance program. Ms. Claire is not a licensed  and is currently representing herself (pro se) in ongoing federal civil litigation.  Based on my continued medical care and coordination with her treating specialists, it is my professional opinion that she is medically unable to continue litigating her case without legal representation. Requiring her to do so poses a serious risk to her physical and neurological health and creates a high likelihood of procedural and substantive prejudice in her case.  Ms. Claire's medical records support the following:  Neurological impairment:  MRI Brain Without Contrast (CPT 17617), dated July 13, 2022, revealed:  \"Scattered 3-4 mm T2/FLAIR signal hyperintense foci in the frontal and temporal lobes, likely due to prior inflammation or infection.\"  (Dictated by Dr. Alli Bassett.)  Systemic inflammation:  CRP and Sedimentation Rate, February 6, 2025:  CRP: 1.10 mg/dL (above normal <1.00 mg/dL), and Sed Rate: 42 mm/hr (normal: 0-20 mm/hr), consistent with active systemic inflammation.  (Assessment by Dr. Sergio Ford, recommending rheumatology follow-up.)  Post-surgical infection and complications:  In February 2020, she was treated by Dr. Roberto Lew for a post-operative infection:  \"Red, swollen wound with drainage; infection treated with cephalexin and fluconazole.\" The infection later entered the bloodstream and was medically linked to neurological fatigue and speech decline.  Upper extremity disability:  Diagnosed with Lateral Epicondylitis of both elbows (ICD-10: M77.11, M77.12), bilateral carpal tunnel syndrome, and other orthopedic injuries. She has undergone multiple surgeries, with ongoing limitations that affect her ability to write, type, and manage case filings.  Cognitive and verbal fatigue:  She continues to experience neurological fatigue, executive  dysfunction, and speech decline, clinically associated with \"sequela of prior inflammation and infection.\"  Given the severity of these conditions, I support Ms. Claire's request to be referred to the pro shama  program. In my clinical opinion, requiring her to proceed without legal assistance is not only medically inappropriate but also places her at a disadvantage that threatens the fairness of the legal process.  Please contact my office if further clarification or supporting documentation is needed.  Sincerely,  [Physician's Name, MD/DO/NP/PA]  Primary Care Provider    Wt Readings from Last 3 Encounters:   05/21/25 156 lb 12.8 oz (71.1 kg)   02/27/25 150 lb (68 kg)   02/19/25 150 lb (68 kg)       BMI Readings from Last 3 Encounters:   05/21/25 24.56 kg/m²   02/27/25 25.75 kg/m²   02/19/25 25.75 kg/m²       BP Readings from Last 3 Encounters:   05/21/25 119/85   02/27/25 121/87   02/19/25 135/88         Review of Systems    Results  RADIOLOGY  Brain MRI without contrast: No acute intracranial process or evidence of acute infarct (04/2023)      Medical History:      Past Medical History:    Allergic rhinitis    Back problem    Balance disorder    Blurred vision    Carpal tunnel syndrome on both sides    Carpal tunnel syndrome, right    Chronic headaches    Depression    Esophageal reflux    Frequent UTI    Headache disorder    Tension Headaches    History of surgical removal of ganglion cyst    Hyperlipidemia    Medical management    Irregular bowel habits    Irritable bowel syndrome    Lipid screening    Per [employer]    Migraines    Numbness    Osteoarthritis    left ankle and toes    Pain with bowel movements    Pyelonephritis    x2    Sleep disturbance    UTI (urinary tract infection)    Vaginal delivery ([facility])    Visual impairment    Walking troubles    Ostereoarthritis       Past Surgical History:   Procedure Laterality Date    Carpal tunnel release Left 2019    Elbow arthroscop,part debride Right  02/2022    with tendon repair    Other Left     removal of left ganglion cyst on left wrist    Wrist arthroscop,release xvers lig Right 01/24/2020    Right endoscopic carpal tunnel release          Current Outpatient Medications   Medication Sig Dispense Refill    dicyclomine 20 MG Oral Tab Take 1 tablet (20 mg total) by mouth 4 (four) times daily before meals and nightly.      diazePAM 2 MG Oral Tab Take 1 tablet (2 mg total) by mouth every 12 (twelve) hours as needed for Anxiety.      Magnesium 200 MG Oral Tab Take 1 tablet (200 mg total) by mouth daily. 90 tablet 0    montelukast (SINGULAIR) 10 MG Oral Tab Take 1 tablet (10 mg total) by mouth nightly. 90 tablet 1    pregabalin (LYRICA) 150 MG Oral Cap Take 1 capsule (150 mg total) by mouth 2 (two) times daily. Was already on gabapentin in the past and continues to have pain.  She has carpal tunnel pain. 60 capsule 1    traMADol 50 MG Oral Tab Take 1 tablet (50 mg total) by mouth every 6 (six) hours as needed for Pain. No alcohol or driving on this med. Stop if lethargic or hallucinating. 20 tablet 0    triamcinolone 0.1 % External Ointment Apply to affected area twice a day 80 g 1    Magnesium 100 MG Oral Tab Take 1 tablet (100 mg total) by mouth at bedtime. 30 tablet 0    DULoxetine 30 MG Oral Cap DR Particles Take 2 capsules (60 mg total) by mouth daily. 180 capsule 3    famotidine 20 MG Oral Tab Take 1 tablet (20 mg total) by mouth 2 (two) times daily as needed for Heartburn. 60 tablet 3    SUMAtriptan Succinate 100 MG Oral Tab Use at onset; repeat once after 2 HRS-ONLY 2 IN 24 HR MAX.  This is a 30 day supply. 9 tablet 3    meclizine 25 MG Oral Tab Take 1 tablet (25 mg total) by mouth 3 (three) times daily as needed for Dizziness. 20 tablet 0    cyclobenzaprine 5 MG Oral Tab Take 1 tablet (5 mg total) by mouth 3 (three) times daily as needed. 30 tablet 0    acetaminophen 500 MG Oral Tab Take 2 tablets (1,000 mg total) by mouth every 8 (eight) hours as needed  for Pain. 40 tablet 0    ondansetron 4 MG Oral Tablet Dispersible Take 1 tablet (4 mg total) by mouth every 4 (four) hours as needed for Nausea. 10 tablet 0    docusate sodium (DULCOLAX STOOL SOFTENER) 100 MG Oral Cap Take 1 capsule (100 mg total) by mouth 2 (two) times daily as needed for constipation. 30 capsule 0    atorvastatin 10 MG Oral Tab Take 1 tablet (10 mg total) by mouth nightly. 90 tablet 3     Allergies:  Allergies   Allergen Reactions    Ibuprofen SWELLING, Tightness in Throat and ANAPHYLAXIS    Nsaids SWELLING            Physical Exam:   Limited examination due to this being a video visit       Patient was speaking in complete sentences, no increased work of breathing and very coherent and alert on the phone.  Alert and oriented x 3.   Patient was responding to questions appropriately.  Patient did not sound short of breath.      Physical Exam          Assessment / Plan:      Diagnoses and all orders for this visit:    Frequent headaches  I sent her a [employer] message after reviewing the information in the letter and stated I just did not feel comfortable stating that she is in need of pro shama legal representation from a medical perspective.  I also let her know I see that she is seeing neurology tomorrow and perhaps this is something that they would agree to and perhaps she even needs to get in touch with a psychologist or psychiatrist to assist her in this type of letter.    Anxiety  Is on duloxetine  Depressive disorder  As above  Flexural atopic dermatitis  -     triamcinolone 0.1 % External Ointment; Apply to affected area twice a day.  For flareups  While on the phone she wanted a refill of the triamcinolone which helps for eczema flareups  Elevated C-reactive protein (CRP)  I recommended that she see rheumatology and she has an appointment in December.  Fibromyalgia  Again she will see rheumatology.        No follow-ups on file.      Assessment & Plan  Sequelae of previous brain  inflammation  She is seeking assistance with a disability case due to sequelae from previous brain inflammation, confirmed by two MRIs per patient. She experiences worsening speech delays and memory loss, contributing to physical and mental fatigue. A letter was previously written in February 2025, and a  new letter is requested to support her case for a pro [employer] .  - Send the finalized letter through [employer] for her review    Fibromyalgia  Fibromyalgia, managed by Dr. Ledesma, a rheumatologist, contributes to her overall fatigue and disability case.    Yeast overgrowth  She develops yeast infections after penicillin use. Dr. Ledesma expressed concern about a possible systemic yeast infection and requested a yeast overgrowth test. She is advised to contact Dr. Ledesma's office for clarification on the specific test.  - Advise her to contact Dr. Ledesma's office for clarification on the yeast overgrowth test    Atopic dermatitis  She experiences very dry skin on her hands, diagnosed as atopic dermatitis. Triamcinolone has been prescribed for flare-ups, with advice to use it only during flare-ups to avoid skin thinning.  - Prescribe triamcinolone for flare-ups of atopic dermatitis  - Send prescription to [facility]        Medical History:         Reviewed Allergies:  Allergies   Allergen Reactions    Ibuprofen SWELLING, Tightness in Throat and ANAPHYLAXIS    Nsaids SWELLING      Reviewed Past Medical History:  Past Medical History:    Allergic rhinitis    Back problem    Balance disorder    Blurred vision    Carpal tunnel syndrome on both sides    Carpal tunnel syndrome, right    Chronic headaches    Depression    Esophageal reflux    Frequent UTI    Headache disorder    Tension Headaches    History of surgical removal of ganglion cyst    Hyperlipidemia    Medical management    Irregular bowel habits    Irritable bowel syndrome    Lipid screening    Per [employer]    Migraines    Numbness    Osteoarthritis    left  ankle and toes    Pain with bowel movements    Pyelonephritis    x2    Sleep disturbance    UTI (urinary tract infection)    Vaginal delivery ([facility])    Visual impairment    Walking troubles    Ostereoarthritis      Reviewed Family History:  Family History   Problem Relation Age of Onset    Lipids Mother     Cancer Brother         Leukemia    Psychiatric Brother         Drug Abuse    Asthma Other         Children    Other (Other) Maternal Grandmother         Coronary artery disease, (cause of death at age 52)    Diabetes Father     No Known Problems Son     No Known Problems Sister     No Known Problems Son     No Known Problems Son     No Known Problems Sister     No Known Problems Sister     No Known Problems Sister     No Known Problems Brother     No Known Problems Brother     No Known Problems Brother     No Known Problems Brother     No Known Problems Brother        Reviewed Social History:  Social History     Socioeconomic History    Marital status:    Tobacco Use    Smoking status: Never    Smokeless tobacco: Never   Vaping Use    Vaping status: Never Used   Substance and Sexual Activity    Alcohol use: Never    Drug use: No   Other Topics Concern    Caffeine Concern Yes     Comment: (Coffee, Soda) 2 cups daily    Left Handed No    Right Handed Yes    Currently spends a great deal of time in the sun No    History of tanning No    Hx of Spending Great Deal of Time in Sun No    Bad sunburns in the past No    Tanning Salons in the Past No    Hx of Radiation Treatments No      Reviewed Current Medications:  Current Outpatient Medications   Medication Sig Dispense Refill    dicyclomine 20 MG Oral Tab Take 1 tablet (20 mg total) by mouth 4 (four) times daily before meals and nightly.      diazePAM 2 MG Oral Tab Take 1 tablet (2 mg total) by mouth every 12 (twelve) hours as needed for Anxiety.      Magnesium 200 MG Oral Tab Take 1 tablet (200 mg total) by mouth daily. 90 tablet 0    montelukast (SINGULAIR) 10  MG Oral Tab Take 1 tablet (10 mg total) by mouth nightly. 90 tablet 1    pregabalin (LYRICA) 150 MG Oral Cap Take 1 capsule (150 mg total) by mouth 2 (two) times daily. Was already on gabapentin in the past and continues to have pain.  She has carpal tunnel pain. 60 capsule 1    traMADol 50 MG Oral Tab Take 1 tablet (50 mg total) by mouth every 6 (six) hours as needed for Pain. No alcohol or driving on this med. Stop if lethargic or hallucinating. 20 tablet 0    triamcinolone 0.1 % External Ointment Apply to affected area twice a day 80 g 1    Magnesium 100 MG Oral Tab Take 1 tablet (100 mg total) by mouth at bedtime. 30 tablet 0    DULoxetine 30 MG Oral Cap DR Particles Take 2 capsules (60 mg total) by mouth daily. 180 capsule 3    famotidine 20 MG Oral Tab Take 1 tablet (20 mg total) by mouth 2 (two) times daily as needed for Heartburn. 60 tablet 3    SUMAtriptan Succinate 100 MG Oral Tab Use at onset; repeat once after 2 HRS-ONLY 2 IN 24 HR MAX.  This is a 30 day supply. 9 tablet 3    meclizine 25 MG Oral Tab Take 1 tablet (25 mg total) by mouth 3 (three) times daily as needed for Dizziness. 20 tablet 0    cyclobenzaprine 5 MG Oral Tab Take 1 tablet (5 mg total) by mouth 3 (three) times daily as needed. 30 tablet 0    acetaminophen 500 MG Oral Tab Take 2 tablets (1,000 mg total) by mouth every 8 (eight) hours as needed for Pain. 40 tablet 0    ondansetron 4 MG Oral Tablet Dispersible Take 1 tablet (4 mg total) by mouth every 4 (four) hours as needed for Nausea. 10 tablet 0    docusate sodium (DULCOLAX STOOL SOFTENER) 100 MG Oral Cap Take 1 capsule (100 mg total) by mouth 2 (two) times daily as needed for constipation. 30 capsule 0    atorvastatin 10 MG Oral Tab Take 1 tablet (10 mg total) by mouth nightly. 90 tablet 3            Geeta Macdonald advised to follow CDC guidelines for self isolation and symptomatic treatment as outlined on CDC Patient Guidelines. Geeta Macdonald understands video evaluation is  not a substitute for face-to-face examination or emergency care. Patient advised to go to ER or call 911 for worsening symptoms or acute distress. (NOTE: Not every complaint above will be related to the COVID-19 pandemic).    Please note that the following visit was completed using two-way, real-time interactive audio and/or video communication.  This has been done in good jordyn to provide continuity of care in the best interest of the provider-patient relationship, due to the ongoing public health crisis/national emergency and because of restrictions of visitation.  There are limitations of this visit as no physical exam could be performed.  Every conscious effort was taken to allow for sufficient and adequate time.  This billing was spent on reviewing labs, medications, radiology tests and decision making.  Appropriate medical decision-making and tests are ordered as detailed in the plan of care above.    Sergio Ford DO  6/12/2025

## 2025-06-12 NOTE — TELEPHONE ENCOUNTER
Patient, would like to know if the doctor can give her an order for Comprehensive stool test with yeast cultures and candida antibody panel. Per the patient the doctor wrote these test down on a piece of paper for her. Patient would like to know if a message can be sent to her through her [employer] when the orders are entered.

## 2025-06-13 ENCOUNTER — OFFICE VISIT (OUTPATIENT)
Dept: NEUROLOGY | Facility: CLINIC | Age: 45
End: 2025-06-13
Payer: MEDICAID

## 2025-06-13 ENCOUNTER — LAB ENCOUNTER (OUTPATIENT)
Dept: LAB | Facility: HOSPITAL | Age: 45
End: 2025-06-13
Attending: Other
Payer: MEDICAID

## 2025-06-13 DIAGNOSIS — M54.2 NECK PAIN: ICD-10-CM

## 2025-06-13 DIAGNOSIS — M54.81 BILATERAL OCCIPITAL NEURALGIA: ICD-10-CM

## 2025-06-13 DIAGNOSIS — R51.9 RIGHT-SIDED HEADACHE: Primary | ICD-10-CM

## 2025-06-13 DIAGNOSIS — G62.9 NEUROPATHY: ICD-10-CM

## 2025-06-13 LAB
CK SERPL-CCNC: 43 U/L (ref 34–145)
CRP SERPL-MCNC: <0.4 MG/DL (ref ?–1)
ERYTHROCYTE [SEDIMENTATION RATE] IN BLOOD: 18 MM/HR (ref 0–20)
FOLATE SERPL-MCNC: 14.7 NG/ML (ref 5.4–?)
VIT B12 SERPL-MCNC: 1099 PG/ML (ref 211–911)

## 2025-06-13 PROCEDURE — 36415 COLL VENOUS BLD VENIPUNCTURE: CPT

## 2025-06-13 PROCEDURE — 82746 ASSAY OF FOLIC ACID SERUM: CPT

## 2025-06-13 PROCEDURE — 82550 ASSAY OF CK (CPK): CPT

## 2025-06-13 PROCEDURE — 84207 ASSAY OF VITAMIN B-6: CPT

## 2025-06-13 PROCEDURE — 82607 VITAMIN B-12: CPT

## 2025-06-13 PROCEDURE — 85652 RBC SED RATE AUTOMATED: CPT

## 2025-06-13 PROCEDURE — 84425 ASSAY OF VITAMIN B-1: CPT

## 2025-06-13 PROCEDURE — 86140 C-REACTIVE PROTEIN: CPT

## 2025-06-13 RX ORDER — METHYLPREDNISOLONE 4 MG/1
TABLET ORAL
COMMUNITY
Start: 2025-05-17

## 2025-06-13 RX ORDER — LAMOTRIGINE 25 MG/1
TABLET, CHEWABLE ORAL
Qty: 148 TABLET | Refills: 0 | Status: SHIPPED | OUTPATIENT
Start: 2025-06-13 | End: 2025-07-27

## 2025-06-13 NOTE — TELEPHONE ENCOUNTER
I saw her in 2023 for joint pain.  I do not do any stool tests for overgrowth or yeast infections.  I would recommend further discuss with her PCP or GI

## 2025-06-13 NOTE — PROGRESS NOTES
[address], [address]  [facility] [address]  [phone]          [facility]  NEW PATIENT EVALUATION  Reason for Admission/Consultation:  new right sided HA x 3 weeks     Requested by:   PCP: Sergio Ford DO  Chief Complaint: Migraine (LOV 11/2022 (3 years ago) Seen for Intractable chronic migraine without aura and with status migrainosus. //Patient presents here today for late follow-up/ Patient reports having daily migraines, with light sensitivity with blurry vision, left loss of hearing and right noise all the time and imbalance and dizziness, nausea, Skull pain right side. She is requesting reevaluate MRI of the brain and neck.Current medications  Sumatriptan, Lyrica, Duloxetine, magnesium,cyclobenzaprine. Patient gives verbal )      HPI:  Geeta Macdonald is a 45 year old   / a pmhx of  ?neuropathic pain, HLD,  B/L Lateral and R medial epicondylitis, R CTS,  Left sided sciatica, patellofemoral pain syndrome, chronic knee pain, right elbow tendinopathy, ulnar neuritis, b/l foot pain,and fibromyalgia who presents for new right sided stabbing HA.     When specifically asked she reports she has sx of occipital neuralgia.    History of Present Illness  Geeta Macdonald is a 45 year old female with chronic pain and neuropathy who presents with stabbing head pain and associated symptoms.    She experiences stabbing pain on the right side of her head, occurring once every three days for the past three weeks. The pain is described as feeling like a 'knife' and is followed by a sensation of coldness. It lasts for about five minutes and radiates from the right parietal area to her eye and mouth. She also experiences visual disturbances such as flashes of light before the onset of pain. Associated symptoms include neck pain, stiffness, and swelling, particularly in the lymph nodes. She is unsure if her right eyelid droops during the pain episodes.  Additional symptoms include dizziness, speech difficulties, and a sensation of walking on needles due to neuropathy.    Her current medications include Lyrica and Cymbalta, which she feels are not effectively managing her pain. She has a history of taking Tramadol, which she has run out of, and previously used Tylenol but has reduced its use. She mentions having colitis and irritable bowel syndrome, which she believes are exacerbated by her current medications.    She has a history of chronic inflammation since 2022, with elevated inflammatory markers. She is currently seeking accommodations for her long-term disability case due to her chronic pain and inflammation. She has been experiencing shooting pains in her head and neck, which she describes as feeling like 'broken glass' or 'smoke cracking'.    No claustrophobia. She has a history of receiving multiple injections in her elbows and wrists, which she felt were ineffective. She is concerned about the possibility of a connective tissue disorder or fibromyalgia and is awaiting further evaluation.        Review and summation of prior records      ROS:  Pertinent positive and negatives per HPI.  All others were reviewed and negative.    Past Medical History[1]    Medications - Current[2]    Allergies[3]    Past Surgical History[4]    Family History[5]    Social history:  History   Smoking Status    Never   Smokeless Tobacco    Never     History   Alcohol Use Never     History   Drug Use No       Family History[6]    Objective:  Vitals  Last menstrual period 05/12/2025, not currently breastfeeding.  LMP 05/12/2025 (Within Days)   There is no height or weight on file to calculate BMI.  There were no vitals filed for this visit.     Exam:  - General: appears stated age and no distress   HEENT: TTP in b/l occipital grooves  - Pulmonary: No signs of respiratory distress.    Neurologic Exam  - Mental Status: Alert and attentive.  Easily becomes tearful.  Speech is  spontaneous, fluent, and prosodic. Comprehension and repetition intact. Phrase length and rate are normal. No paraphasic errors, neologisms, anomia, acalculia, apraxia, anosognosia, or R/L confusion.   - Cranial Nerves: No gaze preference. Visual fields:normal.  Pupils are equally round and reactive to light  in a well lit room. EOMI. No nystagmus. No ptosis. V1 to V3 intact to LT and temperature in all 3 branches. No pathological facial asymmetry. No flattening of the nasolabial fold. .  Hearing grossly intact.  Tongue midline. No atrophy or fasiculations of the tongue noted. Palate and uvula elevate symmetrically.  Shoulder shrug symmetric.  - Fundoscopic exam:normal w/o hemorrhages, exudates, or papilledema.No attenuation. No pallor.  - Motor:  normal tone, normal bulk. No interosseous wasting. No flattening of hypothenar eminences.       Right Left     Motor Strength      Biceps 5 5  Wrist Extensors 5 5   5 5   Hip Flexors 5 5          Pronator drift: No pronator drift   Arm Rolling: No orbiting.   Finger Taps: Finger taps are symmetric in rate and amplitude.    Rapid movements: Rapid/fine movements are symmetric. As expected their dominant hand is slightly faster.   Foot Taps: Foot taps are symmetric.      Asterixis: No asterixis noted.   Tremor:       Reflexes:    C5 C6 C7  L4 S1   R 2+ 2+  2+ 2+   L 2+ 2+  2+ 2+   Adductor Spread: No adductor spread noted.    Frontal release signs:Not assessed.    Jaw Jerk:    Emma's sign:absent   Nonsustained clonus: Absent   Sustained clonus: Absent   - Sensory:   Light touch: normal  Temperature: decreased in L UE compared to R UE.   Pinprick: normal  Vibration: normal  Proprioception:      Sensory level:      Romberg:   - Cerebellum: No truncal ataxia. No titubations. No dysmetria, no dysdiadochokinesis. No overshoot.        Data reviewed    Test results/Imaging:   Lab Results   Component Value Date    TSH 1.175 02/06/2025     Lab Results   Component Value Date     HDL 66 (H) 06/03/2020     (H) 06/03/2020    TRIG 147 06/03/2020     Lab Results   Component Value Date    HGB 13.4 02/06/2025    HCT 40.1 02/06/2025    MCV 85.3 02/06/2025    WBC 8.0 02/06/2025    .0 02/06/2025      Lab Results   Component Value Date    BUN 11 02/06/2025    CA 9.4 02/06/2025    ALT 21 02/06/2025    AST 17 02/06/2025    ALKPHOS 86 11/05/2011    ALB 4.8 02/06/2025     02/06/2025    K 3.5 02/06/2025     02/06/2025    CO2 26.0 02/06/2025      I have reviewed labs.    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 Also component of fibromyalgia. Can add lamictal to see if it will help w her pain. Sellar masses can mimic trigeminal autonomic cephalgias, thus will order mri pituitary wwo. Also mri c spine given complaints of neck pain. Rec b/l occipital nerve blocks given b/l ttp in her occipital grooves. Needs to continue to follow w/ rheumatology.  She requested her ESR and CRP be checked, which have been chronically elevated w/o a clear etiology.     Chronic pain with neuropathic features  Chronic pain with neuropathic features characterized by stabbing pain on the right side of the head, radiating to the eye and mouth, with associated cold sensation. Pain occurs once every three days, lasting up to five minutes. Symptoms include photophobia, possible right eyelid drooping, and speech difficulties. Current medications include pregabalin and duloxetine, with limited relief. Gabapentin and duloxetine dose increase discussed. Lamotrigine considered for pain management. Differential includes tension headaches and possible fibromyalgia.  - Increase dose of gabapentin and duloxetine if she agrees  - Prescribe lamotrigine for pain management  - Order MRI of the brain to rule out parasitic infection  - Consider neck injections with  lidocaine if MRI indicates necessity    Fibromyalgia  Fibromyalgia with chronic widespread pain and elevated inflammatory markers since 2022. No signs of inflammatory arthritis on lab work. Aerobic exercise recommended as primary treatment. She reports temporary relief with exercise despite initial discomfort.  - Encourage aerobic exercise as primary treatment for fibromyalgia  - Complete letter for her long-term disability case    Sciatica  Sciatica with current extreme pain. She inquires about potential for a nerve block.  - Consider ordering a nerve block for sciatica pain management    Irritable bowel syndrome  Irritable bowel syndrome with concerns about medication exacerbating symptoms. She reports colitis and believes medications may worsen condition.    Follow-up  Follow-up in six weeks to assess response to treatment and review MRI results.  - Schedule follow-up appointment in six weeks  - Order additional blood test including sedimentation rate          1. Right-sided headache  - MRI PITUITARY (W+WO) (CPT=70553); Future  - MRI SPINE CERVICAL (CPT=72141); Future    2. Bilateral occipital neuralgia  - MRI SPINE CERVICAL (CPT=72141); Future    3. Neck pain  - MRI SPINE CERVICAL (CPT=72141); Future    4. Neuropathy  - Vitamin B6; Future  - Vitamin B12; Future  - Folic Acid Serum (Folate); Future  - Vitamin B1 (Thiamine), Blood; Future  - CK (Creatine Kinase) (Not Creatinine); Future  - Sed Rate, Westergren (Automated) [E]; Future  - C-Reactive Protein [E]; Future      No orders of the defined types were placed in this encounter.          Education/Instructions given to: patient   Barriers to Learning:None  Content: Refer to note above. Evaluation/Outcome: Verbalized understanding    This document is not intended to support charting by exception.  Sections left blank in a completed note should be presumed not to have been done.    Education and counseling provided to patient. Instructed patient to call my  office or seek medical attention immediately if symptoms worsen.  All questions were answered. All side effects of drugs were discussed.          Return to clinic in: No follow-ups on file.    Clinton Crook DO  [facility]   06/13/25  10:57 AM         [1]   Past Medical History:   Allergic rhinitis    Back problem    Balance disorder    Blurred vision    Carpal tunnel syndrome on both sides    Carpal tunnel syndrome, right    Chronic headaches    Depression    Esophageal reflux    Frequent UTI    Headache disorder    Tension Headaches    History of surgical removal of ganglion cyst    Hyperlipidemia    Medical management    Irregular bowel habits    Irritable bowel syndrome    Lipid screening    Per [employer]    Migraines    Numbness    Osteoarthritis    left ankle and toes    Pain with bowel movements    Pyelonephritis    x2    Sleep disturbance    UTI (urinary tract infection)    Vaginal delivery (HCC)    Visual impairment    Walking troubles    Ostereoarthritis   [2]   Current Outpatient Medications:     methylPREDNISolone 4 MG Oral Tablet Therapy Pack, TAKE BY MOUTH AS DIRECTED PER PACKAGE INSTRUCTIONS, Disp: , Rfl:     lamoTRIgine (LAMICTAL) 25 MG Oral Chew Tab, Chew 1 tablet (25 mg total) by mouth 2 (two) times a day for 14 days, THEN 2 tablets (50 mg total) 2 (two) times a day., Disp: 148 tablet, Rfl: 0    triamcinolone 0.1 % External Ointment, Apply to affected area twice a day.  For flareups, Disp: 45 g, Rfl: 0    dicyclomine 20 MG Oral Tab, Take 1 tablet (20 mg total) by mouth 4 (four) times daily before meals and nightly., Disp: , Rfl:     diazePAM 2 MG Oral Tab, Take 1 tablet (2 mg total) by mouth every 12 (twelve) hours as needed for Anxiety., Disp: , Rfl:     Magnesium 200 MG Oral Tab, Take 1 tablet (200 mg total) by mouth daily., Disp: 90 tablet, Rfl: 0    montelukast (SINGULAIR) 10 MG Oral Tab, Take 1 tablet (10 mg total) by mouth nightly., Disp: 90 tablet, Rfl: 1    pregabalin  (LYRICA) 150 MG Oral Cap, Take 1 capsule (150 mg total) by mouth 2 (two) times daily. Was already on gabapentin in the past and continues to have pain.  She has carpal tunnel pain., Disp: 60 capsule, Rfl: 1    traMADol 50 MG Oral Tab, Take 1 tablet (50 mg total) by mouth every 6 (six) hours as needed for Pain. No alcohol or driving on this med. Stop if lethargic or hallucinating., Disp: 20 tablet, Rfl: 0    DULoxetine 30 MG Oral Cap DR Particles, Take 2 capsules (60 mg total) by mouth daily., Disp: 180 capsule, Rfl: 3    famotidine 20 MG Oral Tab, Take 1 tablet (20 mg total) by mouth 2 (two) times daily as needed for Heartburn., Disp: 60 tablet, Rfl: 3    SUMAtriptan Succinate 100 MG Oral Tab, Use at onset; repeat once after 2 HRS-ONLY 2 IN 24 HR MAX.  This is a 30 day supply., Disp: 9 tablet, Rfl: 3    meclizine 25 MG Oral Tab, Take 1 tablet (25 mg total) by mouth 3 (three) times daily as needed for Dizziness., Disp: 20 tablet, Rfl: 0    cyclobenzaprine 5 MG Oral Tab, Take 1 tablet (5 mg total) by mouth 3 (three) times daily as needed., Disp: 30 tablet, Rfl: 0    acetaminophen 500 MG Oral Tab, Take 2 tablets (1,000 mg total) by mouth every 8 (eight) hours as needed for Pain., Disp: 40 tablet, Rfl: 0    ondansetron 4 MG Oral Tablet Dispersible, Take 1 tablet (4 mg total) by mouth every 4 (four) hours as needed for Nausea., Disp: 10 tablet, Rfl: 0    docusate sodium (DULCOLAX STOOL SOFTENER) 100 MG Oral Cap, Take 1 capsule (100 mg total) by mouth 2 (two) times daily as needed for constipation., Disp: 30 capsule, Rfl: 0    atorvastatin 10 MG Oral Tab, Take 1 tablet (10 mg total) by mouth nightly., Disp: 90 tablet, Rfl: 3  [3]   Allergies  Allergen Reactions    Ibuprofen SWELLING, Tightness in Throat and ANAPHYLAXIS    Nsaids SWELLING   [4]   Past Surgical History:  Procedure Laterality Date    Carpal tunnel release Left 2019    Elbow arthroscop,part debride Right 02/2022    with tendon repair    Other Left     removal  of left ganglion cyst on left wrist    Wrist arthroscop,release xvers lig Right 01/24/2020    Right endoscopic carpal tunnel release   [5]   Family History  Problem Relation Age of Onset    Lipids Mother     Cancer Brother         Leukemia    Psychiatric Brother         Drug Abuse    Asthma Other         Children    Other (Other) Maternal Grandmother         Coronary artery disease, (cause of death at age 52)    Diabetes Father     No Known Problems Son     No Known Problems Sister     No Known Problems Son     No Known Problems Son     No Known Problems Sister     No Known Problems Sister     No Known Problems Sister     No Known Problems Brother     No Known Problems Brother     No Known Problems Brother     No Known Problems Brother     No Known Problems Brother    [6]   Family History  Problem Relation Age of Onset    Lipids Mother     Cancer Brother         Leukemia    Psychiatric Brother         Drug Abuse    Asthma Other         Children    Other (Other) Maternal Grandmother         Coronary artery disease, (cause of death at age 52)    Diabetes Father     No Known Problems Son     No Known Problems Sister     No Known Problems Son     No Known Problems Son     No Known Problems Sister     No Known Problems Sister     No Known Problems Sister     No Known Problems Brother     No Known Problems Brother     No Known Problems Brother     No Known Problems Brother     No Known Problems Brother

## 2025-06-14 ENCOUNTER — PATIENT MESSAGE (OUTPATIENT)
Dept: NEUROLOGY | Facility: CLINIC | Age: 45
End: 2025-06-14

## 2025-06-18 LAB — VITAMIN B6: 9.5 UG/L

## 2025-06-19 LAB — VITAMIN B1 WHOLE BLD: 103.7 NMOL/L

## 2025-08-05 RX ORDER — LAMOTRIGINE 25 MG/1
TABLET, CHEWABLE ORAL
Qty: 148 TABLET | Refills: 0 | Status: SHIPPED | OUTPATIENT
Start: 2025-08-05

## (undated) DIAGNOSIS — M19.071 OSTEOARTHRITIS OF TOE JOINT, RIGHT: Primary | ICD-10-CM

## (undated) DIAGNOSIS — Z47.89 ORTHOPEDIC AFTERCARE: ICD-10-CM

## (undated) DIAGNOSIS — G89.29 CHRONIC TOE PAIN, RIGHT FOOT: ICD-10-CM

## (undated) DIAGNOSIS — M77.02 MEDIAL EPICONDYLITIS OF LEFT ELBOW: Primary | ICD-10-CM

## (undated) DIAGNOSIS — M77.11 LATERAL EPICONDYLITIS OF RIGHT ELBOW: Primary | ICD-10-CM

## (undated) DIAGNOSIS — M77.11 LATERAL EPICONDYLITIS OF RIGHT ELBOW: ICD-10-CM

## (undated) DIAGNOSIS — M79.674 CHRONIC TOE PAIN, RIGHT FOOT: ICD-10-CM

## (undated) DIAGNOSIS — R20.0 NUMBNESS IN BOTH HANDS: ICD-10-CM

## (undated) DIAGNOSIS — Z01.818 PREOP EXAMINATION: ICD-10-CM

## (undated) DIAGNOSIS — M79.671 RIGHT FOOT PAIN: ICD-10-CM

## (undated) DEVICE — TRAY SKIN PREP PVP-1

## (undated) DEVICE — SUTURE ETHILON 4-0 699G

## (undated) DEVICE — DRAPE SRG 70X60IN SPLT U IMPRV

## (undated) DEVICE — DRAPE SHEET LARGE 76X55

## (undated) DEVICE — ESMARK: Brand: DEROYAL

## (undated) DEVICE — CAST TAPE SYNTH 5

## (undated) DEVICE — IMPERVIOUS STOCKINETTE: Brand: DEROYAL

## (undated) DEVICE — SPLINT PLASTER 4

## (undated) DEVICE — SPECIALTY ARM SLING: Brand: DEROYAL

## (undated) DEVICE — STRETCH BANDAGE: Brand: CURITY

## (undated) DEVICE — OCCLUSIVE GAUZE STRIP,3% BISMUTH TRIBROMOPHENATE IN PETROLATUM BLEND: Brand: XEROFORM

## (undated) DEVICE — SOLUTION  .9 1000ML BTL

## (undated) DEVICE — DISPOSABLE TOURNIQUET CUFF DUAL BLADDER, DUAL PORT AND QUICK CONNECT CONNECTOR: Brand: COLOR CUFF

## (undated) DEVICE — ENCORE® LATEX MICRO SIZE 7, STERILE LATEX POWDER-FREE SURGICAL GLOVE: Brand: ENCORE

## (undated) DEVICE — GAMMEX® PI HYBRID SIZE 6.5, STERILE POWDER-FREE SURGICAL GLOVE, POLYISOPRENE AND NEOPRENE BLEND: Brand: GAMMEX

## (undated) DEVICE — DISPOSABLE TOURNIQUET CUFF SINGLE BLADDER, DUAL PORT AND QUICK CONNECT CONNECTOR: Brand: COLOR CUFF

## (undated) DEVICE — UNDYED BRAIDED (POLYGLACTIN 910), SYNTHETIC ABSORBABLE SUTURE: Brand: COATED VICRYL

## (undated) DEVICE — SUCTION CANISTER, 3000CC,SAFELINER: Brand: DEROYAL

## (undated) DEVICE — WEBRIL COTTON UNDERCAST PADDING: Brand: WEBRIL

## (undated) DEVICE — GAMMEX® PI HYBRID SIZE 7.5, STERILE POWDER-FREE SURGICAL GLOVE, POLYISOPRENE AND NEOPRENE BLEND: Brand: GAMMEX

## (undated) DEVICE — SOL  .9 1000ML BTL

## (undated) DEVICE — PLASTIC HAND: Brand: MEDLINE INDUSTRIES, INC.

## (undated) DEVICE — GAMMEX® PI HYBRID SIZE 7, STERILE POWDER-FREE SURGICAL GLOVE, POLYISOPRENE AND NEOPRENE BLEND: Brand: GAMMEX

## (undated) DEVICE — ENCORE® LATEX ACCLAIM SIZE 7, STERILE LATEX POWDER-FREE SURGICAL GLOVE: Brand: ENCORE

## (undated) DEVICE — COTTON UNDERCAST PADDING,REGULAR FINISH: Brand: WEBRIL

## (undated) DEVICE — 3M™ STERI-STRIP™ REINFORCED ADHESIVE SKIN CLOSURES, R1547, 1/2 IN X 4 IN (12 MM X 100 MM), 6 STRIPS/ENVELOPE: Brand: 3M™ STERI-STRIP™

## (undated) DEVICE — STERILE TETRA-FLEX CF, ELASTIC BANDAGE, 4" X 5.5YD: Brand: TETRA-FLEX™CF

## (undated) DEVICE — DRESSING CRTY CNFRM 3IN

## (undated) DEVICE — SUT VICRYL 2-0 FS-1 J443H

## (undated) DEVICE — SUT ETHILON 4-0 P-3 699G

## (undated) DEVICE — 3M™ IOBAN™ 2 ANTIMICROBIAL INCISE DRAPE 6640EZ: Brand: IOBAN™ 2

## (undated) DEVICE — BANDAGE ROLL,100% COTTON, 6 PLY, SMALL: Brand: KERLIX

## (undated) DEVICE — SUT VICRYL 0 CP-1 J267H

## (undated) DEVICE — ECTRA II PROCEDURE KIT,                                    SINGLE-USE, DISPOSABLE. CONTENTS                                    PROBE KNIFE, RETROGRADE KNIFE,                                    TRIANGLE KNIFE, HAND PAD, SWABS: Brand: ECTRA

## (undated) DEVICE — ZIMMER® STERILE DISPOSABLE TOURNIQUET CUFF WITH PLC, DUAL PORT, SINGLE BLADDER, 18 IN. (46 CM)

## (undated) DEVICE — APPLICATOR CHLORAPREP 26ML

## (undated) DEVICE — SUT MONOCRYL 3-0 PS-1 Y936H

## (undated) DEVICE — ENCORE® LATEX ACCLAIM SIZE 8, STERILE LATEX POWDER-FREE SURGICAL GLOVE: Brand: ENCORE

## (undated) DEVICE — CHLORAPREP 26ML APPLICATOR

## (undated) DEVICE — OSTEOTOME INTM SGL SRG CTRS

## (undated) DEVICE — GAMMEX® NON-LATEX PI ORTHO SIZE 7, STERILE POLYISOPRENE POWDER-FREE SURGICAL GLOVE: Brand: GAMMEX

## (undated) DEVICE — CASED DISP BIPOLAR CORD

## (undated) DEVICE — UPPER EXTREMITY: Brand: MEDLINE INDUSTRIES, INC.

## (undated) DEVICE — GAMMEX® NON-LATEX PI ORTHO SIZE 8, STERILE POLYISOPRENE POWDER-FREE SURGICAL GLOVE: Brand: GAMMEX

## (undated) DEVICE — UNDYED MONOFILAMENT (POLYDIOXANONE), ABSORBABLE SURGICAL SUTURE: Brand: PDS

## (undated) DEVICE — SUTURE VICRYL 2-0 FS-1

## (undated) DEVICE — SOL NACL IRRIG 0.9% 1000ML BTL

## (undated) DEVICE — INTENDED TO BE USED TO OCCLUDE, RETRACT AND IDENTIFY ARTERIES, VEINS, TENDONS AND NERVES IN SURGICAL PROCEDURES: Brand: STERION®  VESSEL LOOP

## (undated) NOTE — LETTER
4/14/2022              Meka Roman        [address] [address]       To Whom it May Concern:    Meka Roman is currently under my medical care and has informed me that she is under long term disability benefits. She is to stay off work while pending recovery of both arms. The next evaluation will be in 3-4 weeks. If you require additional information please contact our office. Sincerely,    Jazz Bush MD  [address] [place] [address]  [phone]            Document electronically generated by:  Choco Barton RN

## (undated) NOTE — ED AVS SNAPSHOT
Kasia Bustos   MRN: [id number]    Department:  [facility] Emergency Department   Date of Visit:  7/5/2018           Disclosure     Insurance plans vary and the physician(s) referred by the ER may not be covered by your plan.  Please cont CARE PHYSICIAN AT ONCE OR RETURN IMMEDIATELY TO THE EMERGENCY DEPARTMENT. If you have been prescribed any medication(s), please fill your prescription right away and begin taking the medication(s) as directed.   If you believe that any of the medications

## (undated) NOTE — LETTER
AUTHORIZATION FOR SURGICAL OPERATION OR OTHER PROCEDURE    1.  I hereby authorize Dr. Danielle Whitmore and the [facility] staff assigned to my case to perform the following operation and/or procedure at the [facility]:    RIGHT lateral epicondyle steroid injectio Patient:           []  Parent    Responsible person                          []  Spouse  In case of minor or                    [] Other  _____________   Incompetent name:  __________________________________________________                               (p

## (undated) NOTE — LETTER
[address] [place] [address]      Authorization for Surgical Operation and Procedure     Date:___________                                                                                                         Time:__________  1. I hereby Carter Lizarraga MD, my physician and his/her assistants (if applicable), which may include medical students, residents, and/or fellows, to perform the following surgical operation/ procedure and administer such anesthesia as may be determined necessary by my physician:  Operation/Procedure name (s) Left carpal tunnel  release  on Brittany José   2. I recognize that during the surgical operation/procedure, unforeseen conditions may necessitate additional or different procedures than those listed above. I, therefore, further authorize and request that the above-named surgeon, assistants, or designees perform such procedures as are, in their judgment, necessary and desirable. 3.   My surgeon/physician has discussed prior to my surgery the potential benefits, risks and side effects of this procedure; the likelihood of achieving goals; and potential problems that might occur during recuperation. They also discussed reasonable alternatives to the procedure, including risks, benefits, and side effects related to the alternatives and risks related to not receiving this procedure. I have had all my questions answered and I acknowledge that no guarantee has been made as to the result that may be obtained. 4.   Should the need arise during my operation or immediate post-operative period, I also consent to the administration of blood and/or blood products. Further, I understand that despite careful testing and screening of blood or blood products by collecting agencies, I may still be subject to ill effects as a result of receiving a blood transfusion and/or blood products.   The following are some, but not all, of the potential risks that can occur: fever and allergic reactions, hemolytic reactions, transmission of diseases such as Hepatitis, AIDS and Cytomegalovirus (CMV) and fluid overload. In the event that I wish to have an autologous transfusion of my own blood, or a directed donor transfusion. I will discuss this with my physician. 5.   I authorize the use of any specimen, organs, tissues, body parts or foreign objects that may be removed from my body during the operation/procedure for diagnosis, research or teaching purposes and their subsequent disposal by hospital authorities. I also authorize the release of specimen test results and/or written reports to my treating physician on the hospital medical staff or other referring or consulting physicians involved in my care, at the discretion of the Pathologist or my treating physician. 6.   I consent to the photographing or videotaping of the operations or procedures to be performed, including appropriate portions of my body for medical, scientific, or educational purposes, provided my identity is not revealed by the pictures or by descriptive texts accompanying them. If the procedure has been photographed/videotaped, the surgeon will obtain the original picture, image, videotape or CD. The hospital will not be responsible for storage, release or maintenance of the picture, image, tape or CD.    7.   I consent to the presence of a  or observers in the operating room as deemed necessary by my physician or their designees. 8.   I recognize that in the event my procedure results in extended X-Ray/fluoroscopy time, I may develop a skin reaction. 9. If I have a Do Not Attempt Resuscitation (DNAR) order in place, that status will be suspended while in the operating room, procedural suite, and during the recovery period unless otherwise explicitly stated by me (or a person authorized to consent on my behalf).  The surgeon or my attending physician will determine when the applicable recovery period ends for purposes of reinstating the DNAR order. 10. Patients having a sterilization procedure: I understand that if the procedure is successful the results will be permanent and it will therefore be impossible for me to inseminate, conceive, or bear children. I also understand that the procedure is intended to result in sterility, although the result has not been guaranteed. 11. I acknowledge that my physician has explained sedation/analgesia administration to me including the risk and benefits I consent to the administration of sedation/analgesia as may be necessary or desirable in the judgment of my physician. I CERTIFY THAT I HAVE READ AND FULLY UNDERSTAND THE ABOVE CONSENT TO OPERATION and/or OTHER PROCEDURE.    _________________________________________  __________________________________  Signature of Patient     Signature of Responsible Person         ___________________________________         Printed Name of Responsible Person           _________________________________                  Relationship to Patient  _________________________________________  ______________________________  Signature of Witness          Date  Time    STATEMENT OF PHYSICIAN My signature below affirms that prior to the time of the procedure; I have explained to the patient and/or his/her legal representative, the risks and benefits involved in the proposed treatment and any reasonable alternative to the proposed treatment. I have also explained the risks and benefits involved in refusal of the proposed treatment and alternatives to the proposed treatment and have answered the patient's questions. If I have a significant financial interest in a co-management agreement or a significant financial interest in any product or implant, or other significant relationship used in this procedure/surgery, I have disclosed this and had a discussion with my patient. _______________________________________________________________ _____________________________  Brendia Fat of Physician)                                                                                         (Date)                                   (Time)        Patient Name: Teo Mendoza    : 1980   Printed: 2022      Medical Record #: [id number]                                              Page 1 of 1

## (undated) NOTE — LETTER
3/10/2022          To Whom It May Concern:    Artist Gen is currently under my medical care and she is to have long term benefits and stay off work for the next 6 weeks until reevaluated. If you require additional information please contact our office.         Sincerely,    Carmenza Whitley MD

## (undated) NOTE — LETTER
19          Jerman Ochoa  :  1980      To Whom It May Concern: This patient was seen in our office on 19 . It is recommended that the Patient will be allowed to use a dictation device for work to limit the use of her hands.  P

## (undated) NOTE — LETTER
01/08/21        Martha Lyon  [address] [facility]      Dear Harvey Obrien,    [address] records indicate that you have outstanding lab work and or testing that was ordered for you and has not yet been completed:  Orders Placed This Encounter      Co

## (undated) NOTE — LETTER
19      Patient: Deniz Koch  : 1980 Visit date: 2019    Dear Elizabeth Linton,      I examined your patient in follow-up today.     She has worsening of her right hand carpal tunnel symptoms, and she will be scheduled for

## (undated) NOTE — LETTER
09/17/20        Teresa Sharpe  [address] [facility]      Dear Afua Ribeiro,    [facility] records indicate that you have outstanding lab work and or testing that was ordered for you and has not yet been completed:  Orders Placed This Encounter      Co

## (undated) NOTE — LETTER
4/1/2019              Aileen Jennings        [address] [address]         To whom it may concern,    Aileen Jennings is currently a patient under my medical care.   Patient has right lateral epicondylitis which is causing quite a bi

## (undated) NOTE — ED AVS SNAPSHOT
[facility] Emergency Department    [address]     [place] [address]    Phone:  [phone]    Fax:  [phone]           Nevaeh Baker   MRN: [id number]    Department:  [facility] Emergency Department   Date of Visit:  1 and Class Registration line at [phone] or find a doctor online by visiting [email or web address].    IF THERE IS ANY CHANGE OR WORSENING OF YOUR CONDITION, CALL YOUR PRIMARY CARE PHYSICIAN AT ONCE OR RETURN IMMEDIATELY TO [address] [place] [address].     If

## (undated) NOTE — LETTER
8/16/2022          To Whom It May Concern:    Guille Hernandez is currently under my medical care and may not return to work until her surgery. If you require additional information please contact our office.         Sincerely,    Andree Rivers MD

## (undated) NOTE — LETTER
5/5/2022              Geeta Torres        [address] [place] [address]         To Whom It May Concern,      Lea Gómez is currently under my medical care. I saw her in my office on 5/4/22. She is to stay off work while pending recovery of both arms. The next evaluation will be in 4-5 weeks. Sincerely,    Jose Cobian MD  [address] [place], [place] [phone]

## (undated) NOTE — LETTER
06/29/21      To Whom It May Concern:    Shanda Lomas is currently under my medical care. She has been diagnosed with bilateral carpal tunnel syndrome and bilateral epicondylitis.  She is being seen by a physiatrist, hand specialist and has a refer

## (undated) NOTE — LETTER
AUTHORIZATION FOR SURGICAL OPERATION OR OTHER PROCEDURE    1.  I hereby authorize Dr. Joe Peñaloza and the [facility] Office staff assigned to my case to perform the following operation and/or procedure at the [facility] Office:     RIGHT lateral epicondyle and LEFT medial Patient Name: Michelle Schultz  4/8/1980 [id number]       Patient signature:  ___________________________________________________             Relationship to Patient:           []  Parent    Responsible person                          []  Spouse  In ca

## (undated) NOTE — LETTER
10/11/2022          To Whom It May Concern:    Terrell Cruz is currently under my medical care and may not return to work for 3 weeks. If you require additional information please contact our office.         Sincerely,    Phuong Frost MD

## (undated) NOTE — LETTER
5/1/2019              Marlys Sin        [address] [address]         To Whom It May Concern:    Maninder Scott is currently being treated for bilateral carpal tunnel syndrome and bilateral elbow pain due to carpal tunnel

## (undated) NOTE — LETTER
9/13/2019              [address]            To Whom It May Concern:     Jacinta Esteves is currently being treated for bilateral carpal tunnel syndrome and bilateral elbow pain due to carpal tu

## (undated) NOTE — LETTER
07/17/20    Raymond Salazar  [address] [facility]      Dear Ciara Faith,    [facility] records indicate that you have outstanding lab work and or testing that was ordered for you and has not yet been completed:  Orders Placed This Encounter      Mammo Sc

## (undated) NOTE — ED AVS SNAPSHOT
Rachel Quesada   MRN: [id number]    Department:  [facility] Emergency Department   Date of Visit:  8/16/2019           Disclosure     Insurance plans vary and the physician(s) referred by the ER may not be covered by your plan.  Please con CARE PHYSICIAN AT ONCE OR RETURN IMMEDIATELY TO THE EMERGENCY DEPARTMENT. If you have been prescribed any medication(s), please fill your prescription right away and begin taking the medication(s) as directed.   If you believe that any of the medications

## (undated) NOTE — MR AVS SNAPSHOT
[address]  [address] [facility]  [phone]               Thank you for choosing us for your health care visit with Taina Juarez DO.   We are glad to serve you and happy to provide you with this summary Control Line Present with a clear background (yes/no) yes Yes/No    Kit Lot # [id number] Numeric    Kit Expiration Date 67042 Date                  MyChart                  Visit [facility] online at  [email or web address]

## (undated) NOTE — LETTER
19          Marlys Cobb  :  1980      To Whom It May Concern: This patient was seen in our office on 19 . Work status: May return to work full-time, no restrictions    May return to work status per above effective 19.

## (undated) NOTE — LETTER
5/4/2022          To Whom It May Concern:    Chip Araiza is currently under my medical care and was seen in office today. If you require additional information please contact our office.         Sincerely,    Chen Zhou MD

## (undated) NOTE — ED AVS SNAPSHOT
[facility] Emergency Department    [address]     [place] [address]    Phone:  [phone]    Fax:  [phone]           Ariane Cynthia   MRN: [id number]    Department:  [facility] Emergency Department   Date of Visit:  1 If you have difficulty scheduling your follow-up appointment as directed, please call our  at [phone]. Si tiene problemas para programar marcin montrell de seguimiento según lo indicado, llame al encargado de duran al [phone].     It i continue to take your medications as instructed by your Primary Care doctor until you can check with your doctor. Please bring the medication list to your next doctor's appointment.     Any imaging studies and labs completed today can be reviewed in your M

## (undated) NOTE — LETTER
1/15/2020              [address] [place] [address]        [facility] [address]       To Whom It May Concern:    Roberto Ki is scheduled to have right carpal tunnel surgery on 1/24/2020.   She will then be scheduled for post operative O

## (undated) NOTE — LETTER
6/24/2019              Jennifer Butts        [address] [address]         To whom it may concern,    Jennifer Butts is currently a patient under my medical care.   Patient is requested long-term disability due to her pain in her w

## (undated) NOTE — LETTER
9/13/2019          To Whom It May Concern:    Hua Negron is currently under my medical care and may not return to work at this time. Please excuse Jacinta Guzmán for 8-12 weeks. She is scheduled for wrist surgery on 9/17/19.     If you require addit

## (undated) NOTE — LETTER
2/28/2023          To Whom It May Concern:    Candi Maurice is currently under my medical care and may not return to work for 4 weeks when she will be re-evaluated. If you require additional information please contact our office.         Sincerely,    Bipin Villarreal MD

## (undated) NOTE — LETTER
[address]  [facility]  [phone]        To whom it may concern,    This is a letter certifying Clau Espinoza (: 1980) was seen in our office today for a RIGHT Lateral Epicondyle

## (undated) NOTE — LETTER
3/24/2023              [address] [address]         Dear Deanna Aguilar,    This letter is to inform you that our office has made several attempts to reach you by phone without success. We were attempting to contact you by phone because you had a concern about the sedation for the colonoscopy and EGD with Dr. Nikolas Crane. He wanted us to let you know that you had general anesthesia during your last procedure on February 9th. The sedation that we use is generally much milder, propofol and we can administer zofran before procedure for nausea type symptoms. Please contact our office at the number listed below as soon as you receive this letter to discuss this issue and to make the necessary changes in our system to your contact information. Thank you for your cooperation.         Sincerely,    Gastroenterology Clinical Staff  [address], [facility]  [place] [address] [phone]

## (undated) NOTE — LETTER
8/29/2020          To Whom It May Concern,    Jacinta Poon Gary is currently being treated for bilateral carpal tunnel syndrome and bilateral elbow pain due to carpal tunnel syndrome.    She may continue to work at her current position with these a

## (undated) NOTE — LETTER
6/12/2025              Geeta Macdonald        [address] [place] [address]       Date: June 12, 2025  To Whom It May Concern:  RE: Geeta Claire -- Medical Support for Petition for Referral to [employer] [place]   I am the primary care provider for Ms. Geeta Claire, and I am writing this letter at her request, in support of her formal petition to the Court requesting referral to a [place] legal assistance program. Ms. Claire is not a licensed  and is currently representing herself (pro se) in ongoing federal civil litigation.  Based on my continued medical care and coordination with her treating specialists, it is my professional opinion that she is medically unable to continue litigating her case without legal representation. Requiring her to do so poses a serious risk to her physical and neurological health and creates a high likelihood of procedural and substantive prejudice in her case.  Ms. Claire's medical records support the following:  Neurological impairment:  MRI Brain Without Contrast (CPT 48660), dated July 13, 2022, revealed:  \"Scattered 3-4 mm T2/FLAIR signal hyperintense foci in the frontal and temporal lobes, likely due to prior inflammation or infection.\"  (Dictated by Dr. Alli Bassett.)  Systemic inflammation:  CRP and Sedimentation Rate, February 6, 2025:  CRP: 1.10 mg/dL (above normal <1.00 mg/dL), and Sed Rate: 42 mm/hr (normal: 0-20 mm/hr), consistent with active systemic inflammation.  (Assessment by Dr. Sergio Ford, recommending rheumatology follow-up.)  Post-surgical infection and complications:  In February 2020, she was treated by Dr. Roberto Lew for a post-operative infection:  \"Red, swollen wound with drainage; infection treated with cephalexin and fluconazole.\" The infection later entered the bloodstream and was medically linked to neurological fatigue and speech decline.  Upper extremity disability:  Diagnosed with Lateral Epicondylitis of  both elbows (ICD-10: M77.11, M77.12), bilateral carpal tunnel syndrome, and other orthopedic injuries. She has undergone multiple surgeries, with ongoing limitations that affect her ability to write, type, and manage case filings.  Cognitive and verbal fatigue:  She continues to experience neurological fatigue, executive dysfunction, and speech decline, clinically associated with \"sequela of prior inflammation and infection.\"  Given the severity of these conditions, I support Ms. Claire's request to be referred to the pro [place]  program. In my clinical opinion, requiring her to proceed without legal assistance is not only medically inappropriate but also places her at a disadvantage that threatens the fairness of the legal process.  Please contact my office if further clarification or supporting documentation is needed.  Sincerely,  [Physician's Name, MD//NP/PA]  Primary Care Provider  To whom it may concern,    Geeta Macdonald is currently a patient under my medical care.  The patient's medical condition makes serving on jury duty inadvisable at this time.  Please excuse them from serving.  If you require additional information please do not hesitate to contact my office.        Sincerely,    Sergio Ford DO  [address] [address] [phone]        Document electronically generated by:  Sergio Ford DO

## (undated) NOTE — LETTER
19          Leatha Velazquez  :  1980      To Whom It May Concern: This patient was seen in our office on 19 . If this office may be of further assistance, please do not hesitate to contact us.       Sincerely,        Dr Giuseppe Montana Be

## (undated) NOTE — LETTER
19      Patient: Amber Ackerman  : 1980 Visit date: 2019    Dear David [place],      I examined your patient in consultation today. She has right carpal tunnel syndrome with worsening symptoms.   She has been splinted, ha

## (undated) NOTE — LETTER
2/18/2025              Geeta Macdonald        [address] [facility] [address]         To whom it may concern,    Geeta Macdonald is currently a patient under my medical care.  Patient's YOB: 1980.  She brought a MRI of the brain without contrast report that was done on 4/21/2023.  It showed no acute intracranial process or evidence of acute or subacute infarct.  There were a few scattered punctate FLAIR signal hyperintense foci within the cerebral white matter which could represent septal of previous inflammation or previous infection and these findings are very similar in appearance to the MRI from July 13, 2022 so not much has changed from those 2 MRIs and then she also brought all of the inflammatory test consisting of C-reactive protein which is mildly high at 0.49 from May 30, 2023 with the normal range below 0.30 and then a C-reactive protein that was elevated at 1.10 from February 6, 2025 without a normal range of less than 1.0.  These are nonspecific inflammatory markers.  She has had numerous surgeries so they can be elevated due to that as well.  I suggest she see a neurologist and a rheumatologist to help further delineate exactly what is going on.  If you require additional information please do not hesitate to contact my office.        Sincerely,     Sergio Ford DO  [facility], [address] [address] [phone]        Document electronically generated by:  Sergio Ford DO

## (undated) NOTE — Clinical Note
Please schedule for physical exam visit on 6/20/2022 at 1230. This can be placed as a follow-up exam for 20 minutes.

## (undated) NOTE — LETTER
4/13/2022          To Whom It May Concern:    Margy Krueger is currently under my medical care and may not return to work due to R elbow surgery. Patient is due to follow up with doctor in 3 weeks. If you require additional information please contact our office.         Sincerely,    Bernabe Panchal MD

## (undated) NOTE — LETTER
AUTHORIZATION FOR SURGICAL OPERATION OR OTHER PROCEDURE    1.  I hereby authorize Dr. Dayton Mccall and the [facility] Office staff assigned to my case to perform the following operation and/or procedure at the [facility] Office:       RIGHT carpal tunnel CSI under ultrasou ___________________________________________________             Relationship to Patient:           []  Parent    Responsible person                          []  Spouse  In case of minor or                    [] Other  _____________   Incompetent name:  ___

## (undated) NOTE — LETTER
19          Marlys Cobb  :  1980      To Whom It May Concern: This patient was seen in our office on 19 for a medical procedure .   Work status:  Remain off work until re-evaluation at scheduled appointment on 6-8 weeks

## (undated) NOTE — LETTER
19          [facility]  :  1980      To Whom It May Concern: This patient was seen in our office on 19 . It is recommended that the Patient will be allowed to use a dictation device for work to limit the use of her hands.  P